# Patient Record
Sex: MALE | Race: WHITE | NOT HISPANIC OR LATINO | Employment: OTHER | URBAN - METROPOLITAN AREA
[De-identification: names, ages, dates, MRNs, and addresses within clinical notes are randomized per-mention and may not be internally consistent; named-entity substitution may affect disease eponyms.]

---

## 2021-05-10 ENCOUNTER — TELEPHONE (OUTPATIENT)
Dept: NEPHROLOGY | Facility: CLINIC | Age: 81
End: 2021-05-10

## 2021-05-10 NOTE — TELEPHONE ENCOUNTER
I called and spoke to the patient son about setting up the patient for a Nephrology Consult appointment  The patient son stated that he wanted to go to an office closer to the [facility]  I did give the patient our phone number to our [address] [place] [address] office  I did call down to our [address] [place] [address] office and spoke to Oleg and explain that the patient son will be calling to set up an appointment and that he does have a [facility] Referral in the system  The patient son stated that he will give the office a call an set up an appointment for his father   [place] Javad,

## 2021-05-11 ENCOUNTER — TRANSCRIBE ORDERS (OUTPATIENT)
Dept: ADMINISTRATIVE | Facility: HOSPITAL | Age: 81
End: 2021-05-11

## 2021-05-12 ENCOUNTER — TRANSCRIBE ORDERS (OUTPATIENT)
Dept: ADMINISTRATIVE | Facility: HOSPITAL | Age: 81
End: 2021-05-12

## 2021-05-12 DIAGNOSIS — R39.198 DIFFICULTY VOIDING: Primary | ICD-10-CM

## 2021-05-19 ENCOUNTER — HOSPITAL ENCOUNTER (EMERGENCY)
Facility: HOSPITAL | Age: 81
Discharge: HOME | End: 2021-05-19
Attending: INTERNAL MEDICINE | Admitting: INTERNAL MEDICINE
Payer: COMMERCIAL

## 2021-05-19 ENCOUNTER — HOSPITAL ENCOUNTER (OUTPATIENT)
Dept: ULTRASOUND IMAGING | Facility: HOSPITAL | Age: 81
Discharge: HOME | End: 2021-05-19
Payer: COMMERCIAL

## 2021-05-19 VITALS
HEART RATE: 57 BPM | SYSTOLIC BLOOD PRESSURE: 137 MMHG | TEMPERATURE: 98 F | DIASTOLIC BLOOD PRESSURE: 56 MMHG | OXYGEN SATURATION: 98 % | RESPIRATION RATE: 20 BRPM

## 2021-05-19 DIAGNOSIS — N39.0 UTI (URINARY TRACT INFECTION): ICD-10-CM

## 2021-05-19 DIAGNOSIS — R33.9 URINARY RETENTION: Primary | ICD-10-CM

## 2021-05-19 DIAGNOSIS — R39.198 DIFFICULTY VOIDING: ICD-10-CM

## 2021-05-19 LAB
ALBUMIN SERPL BCP-MCNC: 3.6 G/DL (ref 3.5–5.7)
ALP SERPL-CCNC: 48 U/L (ref 55–165)
ALT SERPL W P-5'-P-CCNC: 8 U/L (ref 7–52)
ANION GAP SERPL CALCULATED.3IONS-SCNC: 7 MMOL/L (ref 4–13)
AST SERPL W P-5'-P-CCNC: 12 U/L (ref 13–39)
BACTERIA UR QL AUTO: ABNORMAL /HPF
BASOPHILS # BLD AUTO: 0.1 THOUSANDS/ΜL (ref 0–0.1)
BASOPHILS NFR BLD AUTO: 1 % (ref 0–2)
BILIRUB SERPL-MCNC: 0.3 MG/DL (ref 0.2–1)
BILIRUB UR QL STRIP: NEGATIVE
BUN SERPL-MCNC: 33 MG/DL (ref 7–25)
CALCIUM SERPL-MCNC: 8.6 MG/DL (ref 8.6–10.5)
CHLORIDE SERPL-SCNC: 107 MMOL/L (ref 98–107)
CLARITY UR: ABNORMAL
CO2 SERPL-SCNC: 24 MMOL/L (ref 21–31)
COLOR UR: YELLOW
CREAT SERPL-MCNC: 1.82 MG/DL (ref 0.7–1.3)
EOSINOPHIL # BLD AUTO: 0.4 THOUSAND/ΜL (ref 0–0.61)
EOSINOPHIL NFR BLD AUTO: 5 % (ref 0–5)
ERYTHROCYTE [DISTWIDTH] IN BLOOD BY AUTOMATED COUNT: 14.5 % (ref 11.5–14.5)
GFR SERPL CREATININE-BSD FRML MDRD: 34 ML/MIN/1.73SQ M
GLUCOSE SERPL-MCNC: 95 MG/DL (ref 65–99)
GLUCOSE UR STRIP-MCNC: NEGATIVE MG/DL
HCT VFR BLD AUTO: 34.8 % (ref 42–47)
HGB BLD-MCNC: 11.9 G/DL (ref 14–18)
HGB UR QL STRIP.AUTO: ABNORMAL
KETONES UR STRIP-MCNC: NEGATIVE MG/DL
LEUKOCYTE ESTERASE UR QL STRIP: ABNORMAL
LYMPHOCYTES # BLD AUTO: 2.9 THOUSANDS/ΜL (ref 0.6–4.47)
LYMPHOCYTES NFR BLD AUTO: 38 % (ref 21–51)
MCH RBC QN AUTO: 31.7 PG (ref 26–34)
MCHC RBC AUTO-ENTMCNC: 34.1 G/DL (ref 31–37)
MCV RBC AUTO: 93 FL (ref 81–99)
MONOCYTES # BLD AUTO: 0.5 THOUSAND/ΜL (ref 0.17–1.22)
MONOCYTES NFR BLD AUTO: 7 % (ref 2–12)
NEUTROPHILS # BLD AUTO: 3.8 THOUSANDS/ΜL (ref 1.4–6.5)
NEUTS SEG NFR BLD AUTO: 49 % (ref 42–75)
NITRITE UR QL STRIP: NEGATIVE
NON-SQ EPI CELLS URNS QL MICRO: ABNORMAL /HPF
OTHER STN SPEC: ABNORMAL
PH UR STRIP.AUTO: 6 [PH]
PLATELET # BLD AUTO: 137 THOUSANDS/UL (ref 149–390)
PMV BLD AUTO: 6.8 FL (ref 8.6–11.7)
POTASSIUM SERPL-SCNC: 4.4 MMOL/L (ref 3.5–5.5)
PROT SERPL-MCNC: 6.3 G/DL (ref 6.4–8.9)
PROT UR STRIP-MCNC: ABNORMAL MG/DL
RBC # BLD AUTO: 3.75 MILLION/UL (ref 4.3–5.9)
RBC #/AREA URNS AUTO: ABNORMAL /HPF
SODIUM SERPL-SCNC: 138 MMOL/L (ref 134–143)
SP GR UR STRIP.AUTO: 1.02 (ref 1–1.03)
UROBILINOGEN UR QL STRIP.AUTO: 0.2 E.U./DL
WBC # BLD AUTO: 7.8 THOUSAND/UL (ref 4.8–10.8)
WBC #/AREA URNS AUTO: ABNORMAL /HPF

## 2021-05-19 PROCEDURE — 80053 COMPREHEN METABOLIC PANEL: CPT | Performed by: INTERNAL MEDICINE

## 2021-05-19 PROCEDURE — 76770 US EXAM ABDO BACK WALL COMP: CPT

## 2021-05-19 PROCEDURE — 85025 COMPLETE CBC W/AUTO DIFF WBC: CPT | Performed by: INTERNAL MEDICINE

## 2021-05-19 PROCEDURE — 87086 URINE CULTURE/COLONY COUNT: CPT

## 2021-05-19 PROCEDURE — 99283 EMERGENCY DEPT VISIT LOW MDM: CPT

## 2021-05-19 PROCEDURE — 99284 EMERGENCY DEPT VISIT MOD MDM: CPT | Performed by: INTERNAL MEDICINE

## 2021-05-19 PROCEDURE — 36415 COLL VENOUS BLD VENIPUNCTURE: CPT | Performed by: INTERNAL MEDICINE

## 2021-05-19 PROCEDURE — 81001 URINALYSIS AUTO W/SCOPE: CPT

## 2021-05-19 RX ORDER — LOSARTAN POTASSIUM 50 MG/1
TABLET ORAL
COMMUNITY
Start: 2020-12-24 | End: 2021-05-24

## 2021-05-19 RX ORDER — TAMSULOSIN HYDROCHLORIDE 0.4 MG/1
0.4 CAPSULE ORAL
Qty: 15 CAPSULE | Refills: 0 | Status: SHIPPED | OUTPATIENT
Start: 2021-05-19 | End: 2021-09-27

## 2021-05-19 RX ORDER — CEFDINIR 300 MG/1
CAPSULE ORAL
COMMUNITY
Start: 2021-05-10 | End: 2021-07-16

## 2021-05-19 RX ORDER — FLUOXETINE 10 MG/1
CAPSULE ORAL
COMMUNITY
Start: 2021-05-17

## 2021-05-19 RX ORDER — FINASTERIDE 5 MG/1
TABLET, FILM COATED ORAL
COMMUNITY
Start: 2020-12-24 | End: 2022-06-28 | Stop reason: SDUPTHER

## 2021-05-19 NOTE — ED PROVIDER NOTES
History  Chief Complaint   Patient presents with    Urinary Retention     Sent from Ultrasound  2000cc fluid in abdomen  55-year-old male moved here from [place] about 6 months ago  Lives with his son at this point  Son has been taking him back and forth to the better and associations  He has been somewhat frustrated with the care  Has been describing him having frequency of urination, to the point where he empties every 15-20 minutes at times  Does not completely empty he thinks  He was sent here for an ultrasound where he was found to have over 2000 cc in his bladder and given the hour was sent to the emergency room for definitive care  He was recently thought to have a UTI and placed on Omnicef I suspect from his [facility] appointment  He has known BPH and is on finasteride  Son denies any other significant illness other than hard of hearing, had hypertension but he was removed from all his medications  Ago no recent weight loss or weight gain  He is forgetful but apparently not demented  Prior to Admission Medications   Prescriptions Last Dose Informant Patient Reported? Taking? FLUoxetine (PROzac) 10 mg capsule   Yes Yes   Sig: TAKE 1 CAPSULE BY MOUTH EVERY MORNING FOR MOOD   Zoster Vac Recomb Adjuvanted 50 MCG/0 5ML SUSR   Yes Yes   Sig: INJECT 1 VIAL (0 5ML) INTRAMUSCULARLY ONCE **1ST DOSE**  GIVE FIRST DOSE NOW AND GIVE SECOND DOSE 2 TO 6 MONTHS AFTER INITIAL DOSE **1ST DOSE**  GIVE FIRST DOSE NOW AND GIVE SECOND DOSE 2 TO 6 MONTHS AFTER INITIAL DOSE   cefdinir (OMNICEF) 300 mg capsule   Yes Yes   Sig: TAKE 1 CAPSULE BY MOUTH DAILY   finasteride (PROSCAR) 5 mg tablet   Yes Yes   Sig: TAKE ONE TABLET BY MOUTH DAILY FOR PROSTATE (BPH)   losartan (COZAAR) 50 mg tablet   Yes Yes   Sig: TAKE ONE-HALF TABLET BY MOUTH DAILY FOR BLOOD PRESSURE/HEART      Facility-Administered Medications: None       History reviewed  No pertinent past medical history  History reviewed   No pertinent surgical history  History reviewed  No pertinent family history  I have reviewed and agree with the history as documented  E-Cigarette/Vaping    E-Cigarette Use Never User      E-Cigarette/Vaping Substances     Social History     Tobacco Use    Smoking status: Never Smoker    Smokeless tobacco: Current User     Types: Chew   Substance Use Topics    Alcohol use: Never     Frequency: Never    Drug use: Never       Review of Systems   Constitutional: Negative for chills and fever  HENT: Positive for hearing loss  Negative for rhinorrhea and sore throat  Eyes: Negative for visual disturbance  Respiratory: Negative for cough and shortness of breath  Cardiovascular: Negative for chest pain and leg swelling  Gastrointestinal: Positive for abdominal pain  Negative for diarrhea, nausea and vomiting  Pelvic discomfort   Genitourinary: Positive for difficulty urinating  Negative for dysuria  Musculoskeletal: Positive for back pain  Negative for myalgias  Chronic low back pain   Skin: Negative for rash  Neurological: Negative for dizziness and headaches  Psychiatric/Behavioral: Negative for confusion  All other systems reviewed and are negative  Physical Exam  Physical Exam  Vitals signs and nursing note reviewed  Constitutional:       General: He is not in acute distress  Appearance: Normal appearance  He is well-developed  He is not ill-appearing  Comments: Appropriate for age   HENT:      Nose: Nose normal       Mouth/Throat:      Pharynx: No oropharyngeal exudate  Eyes:      General: No scleral icterus  Conjunctiva/sclera: Conjunctivae normal       Pupils: Pupils are equal, round, and reactive to light  Neck:      Musculoskeletal: Normal range of motion and neck supple  Vascular: No JVD  Trachea: No tracheal deviation  Cardiovascular:      Rate and Rhythm: Normal rate and regular rhythm  Heart sounds: Normal heart sounds   No murmur  Pulmonary:      Effort: Pulmonary effort is normal  No respiratory distress  Breath sounds: Normal breath sounds  No wheezing or rales  Abdominal:      General: Bowel sounds are normal  There is distension  Palpations: Abdomen is soft  Tenderness: There is abdominal tenderness  There is guarding  Comments: Apparent distention of the bladder on exam initial exam as confirmed by ultrasound   Musculoskeletal: Normal range of motion  General: No tenderness  Skin:     General: Skin is warm and dry  Coloration: Skin is pale  Neurological:      Mental Status: He is alert and oriented to person, place, and time  Cranial Nerves: No cranial nerve deficit  Sensory: No sensory deficit  Motor: No abnormal muscle tone        Comments: 5/5 motor, nl sens   Psychiatric:         Behavior: Behavior normal          Vital Signs  ED Triage Vitals [05/19/21 1900]   Temperature Pulse Respirations Blood Pressure SpO2   98 °F (36 7 °C) 57 20 137/56 98 %      Temp Source Heart Rate Source Patient Position - Orthostatic VS BP Location FiO2 (%)   Tympanic Monitor -- Left arm --      Pain Score       --           Vitals:    05/19/21 1900   BP: 137/56   Pulse: 57         Visual Acuity      ED Medications  Medications - No data to display    Diagnostic Studies  Results Reviewed     Procedure Component Value Units Date/Time    Comprehensive metabolic panel [[id number]]  (Abnormal) Collected: 05/19/21 1908    Lab Status: Final result Specimen: Blood from Arm, Right Updated: 05/19/21 1932     Sodium 138 mmol/L      Potassium 4 4 mmol/L      Chloride 107 mmol/L      CO2 24 mmol/L      ANION GAP 7 mmol/L      BUN 33 mg/dL      Creatinine 1 82 mg/dL      Glucose 95 mg/dL      Calcium 8 6 mg/dL      AST 12 U/L      ALT 8 U/L      Alkaline Phosphatase 48 U/L      Total Protein 6 3 g/dL      Albumin 3 6 g/dL      Total Bilirubin 0 30 mg/dL      eGFR 34 ml/min/1 73sq m     Narrative: [facility] guidelines for Chronic Kidney Disease (CKD):     Stage 1 with normal or high GFR (GFR > 90 mL/min/1 73 square meters)    Stage 2 Mild CKD (GFR = 60-89 mL/min/1 73 square meters)    Stage 3A Moderate CKD (GFR = 45-59 mL/min/1 73 square meters)    Stage 3B Moderate CKD (GFR = 30-44 mL/min/1 73 square meters)    Stage 4 Severe CKD (GFR = 15-29 mL/min/1 73 square meters)    Stage 5 End Stage CKD (GFR <15 mL/min/1 73 square meters)  Note: GFR calculation is accurate only with a steady state creatinine    Urine Microscopic [[id number]]  (Abnormal) Collected: 05/19/21 1853    Lab Status: Final result Specimen: Urine, Indwelling Britt Catheter Updated: 05/19/21 1932     RBC, UA 20-30 /hpf      WBC, UA Innumerable /hpf      Epithelial Cells Occasional /hpf      Bacteria, UA Occasional /hpf      OTHER OBSERVATIONS WBCs Clumped    Urine culture [[id number]] Collected: 05/19/21 1853    Lab Status:  In process Specimen: Urine, Indwelling Britt Catheter Updated: 05/19/21 1930    CBC and differential [[id number]]  (Abnormal) Collected: 05/19/21 1908    Lab Status: Final result Specimen: Blood from Arm, Right Updated: 05/19/21 1917     WBC 7 80 Thousand/uL      RBC 3 75 Million/uL      Hemoglobin 11 9 g/dL      Hematocrit 34 8 %      MCV 93 fL      MCH 31 7 pg      MCHC 34 1 g/dL      RDW 14 5 %      MPV 6 8 fL      Platelets 351 Thousands/uL      Neutrophils Relative 49 %      Lymphocytes Relative 38 %      Monocytes Relative 7 %      Eosinophils Relative 5 %      Basophils Relative 1 %      Neutrophils Absolute 3 80 Thousands/µL      Lymphocytes Absolute 2 90 Thousands/µL      Monocytes Absolute 0 50 Thousand/µL      Eosinophils Absolute 0 40 Thousand/µL      Basophils Absolute 0 10 Thousands/µL     UA w Reflex to Microscopic w Reflex to Culture [[id number]]  (Abnormal) Collected: 05/19/21 1853    Lab Status: Final result Specimen: Urine, Indwelling Britt Catheter Updated: 05/19/21 1906 Color, UA Yellow     Clarity, UA Cloudy     Specific Ransom, UA 1 020     pH, UA 6 0     Leukocytes, UA 1+     Nitrite, UA Negative     Protein, UA Trace mg/dl      Glucose, UA Negative mg/dl      Ketones, UA Negative mg/dl      Urobilinogen, UA 0 2 E U /dl      Bilirubin, UA Negative     Blood, UA 2+                 No orders to display              Procedures  Procedures         ED Course  ED Course as of May 19 2007   Wed May 19, 2021   1923 [facility]: 31 7 1935 After Britt was placed patient immediately drained over a Liter  Laboratory work done to ensure no acute renal failure and or electrolyte imbalances  These were centrally normal with the exception of a mildly elevated creatinine for which we have no baseline  Will recommend a follow-up with Urology  Await urine culture as he is already on an antibiotic  1939 Case discussed with patient's son  Will maintain Britt, discharged home  He is already on an antibiotic, but will follow-up with urine culture, already has a urology appointment, already has a Nephrology appointment, this all with the [facility] at this time  MDM    Disposition  Final diagnoses:   Urinary retention   UTI (urinary tract infection)     Time reflects when diagnosis was documented in both MDM as applicable and the Disposition within this note     Time User Action Codes Description Comment    5/19/2021  7:43 PM Tyrel Clements Add [R33 9] Urinary retention     5/19/2021  7:43 PM Tyrel Clements Add [N39 0] UTI (urinary tract infection)       ED Disposition     ED Disposition Condition Date/Time Comment    Discharge Stable Wed May 19, 2021  7:48 PM Brandan Farris discharge to home/self care              Follow-up Information     Follow up With Specialties Details Why Contact Info Additional Information    Mariano Ly MD Family Medicine  As scheduled [address] [address]  [phone]        [facility] Family Medicine Call  Consider for primary care referral [address] [address] [address], [address] [facility], [place]fie Arri[place], [address] [place] [address], [address] [phone]          Patient's Medications   Discharge Prescriptions    TAMSULOSIN (FLOMAX) 0 4 MG    Take 1 capsule (0 4 mg total) by mouth daily with dinner for 15 days       Start Date: 5/19/2021 End Date: 6/3/2021       Order Dose: 0 4 mg       Quantity: 15 capsule    Refills: 0         PDMP Review     None          ED Provider  Electronically Signed by           Jp Feliciano DO  05/19/21 2007

## 2021-05-20 NOTE — ED NOTES
Pt instructed on oliveira care and leg bag care  Pts son at bedside also understanding of care         Renita Jimenes, JERED  05/19/21 2007

## 2021-05-21 LAB — BACTERIA UR CULT: NORMAL

## 2021-05-24 ENCOUNTER — CONSULT (OUTPATIENT)
Dept: NEPHROLOGY | Facility: CLINIC | Age: 81
End: 2021-05-24
Payer: COMMERCIAL

## 2021-05-24 ENCOUNTER — APPOINTMENT (OUTPATIENT)
Dept: LAB | Facility: CLINIC | Age: 81
End: 2021-05-24
Payer: MEDICARE

## 2021-05-24 VITALS
WEIGHT: 166 LBS | SYSTOLIC BLOOD PRESSURE: 136 MMHG | OXYGEN SATURATION: 98 % | HEIGHT: 65 IN | BODY MASS INDEX: 27.66 KG/M2 | DIASTOLIC BLOOD PRESSURE: 68 MMHG | HEART RATE: 81 BPM

## 2021-05-24 DIAGNOSIS — R39.14 BENIGN PROSTATIC HYPERPLASIA WITH INCOMPLETE BLADDER EMPTYING: ICD-10-CM

## 2021-05-24 DIAGNOSIS — N05.9 GLOMERULONEPHRITIS: ICD-10-CM

## 2021-05-24 DIAGNOSIS — N18.32 STAGE 3B CHRONIC KIDNEY DISEASE (HCC): ICD-10-CM

## 2021-05-24 DIAGNOSIS — N40.1 BENIGN PROSTATIC HYPERPLASIA WITH INCOMPLETE BLADDER EMPTYING: ICD-10-CM

## 2021-05-24 DIAGNOSIS — N17.9 AKI (ACUTE KIDNEY INJURY) (HCC): Primary | ICD-10-CM

## 2021-05-24 DIAGNOSIS — I10 ESSENTIAL HYPERTENSION: ICD-10-CM

## 2021-05-24 DIAGNOSIS — R31.9 HEMATURIA, UNSPECIFIED TYPE: ICD-10-CM

## 2021-05-24 LAB
25(OH)D3 SERPL-MCNC: 27 NG/ML (ref 30–100)
ALBUMIN SERPL BCP-MCNC: 3.6 G/DL (ref 3.5–5)
ALP SERPL-CCNC: 75 U/L (ref 46–116)
ALT SERPL W P-5'-P-CCNC: 15 U/L (ref 12–78)
ANION GAP SERPL CALCULATED.3IONS-SCNC: 2 MMOL/L (ref 4–13)
AST SERPL W P-5'-P-CCNC: 11 U/L (ref 5–45)
BASOPHILS # BLD AUTO: 0.06 THOUSANDS/ΜL (ref 0–0.1)
BASOPHILS NFR BLD AUTO: 1 % (ref 0–1)
BILIRUB SERPL-MCNC: 0.49 MG/DL (ref 0.2–1)
BUN SERPL-MCNC: 31 MG/DL (ref 5–25)
C3 SERPL-MCNC: 119 MG/DL (ref 90–180)
C4 SERPL-MCNC: 19 MG/DL (ref 10–40)
CALCIUM SERPL-MCNC: 9 MG/DL (ref 8.3–10.1)
CHLORIDE SERPL-SCNC: 107 MMOL/L (ref 100–108)
CO2 SERPL-SCNC: 29 MMOL/L (ref 21–32)
CREAT SERPL-MCNC: 1.68 MG/DL (ref 0.6–1.3)
EOSINOPHIL # BLD AUTO: 0.4 THOUSAND/ΜL (ref 0–0.61)
EOSINOPHIL NFR BLD AUTO: 5 % (ref 0–6)
ERYTHROCYTE [DISTWIDTH] IN BLOOD BY AUTOMATED COUNT: 13.6 % (ref 11.6–15.1)
FERRITIN SERPL-MCNC: 69 NG/ML (ref 8–388)
GFR SERPL CREATININE-BSD FRML MDRD: 38 ML/MIN/1.73SQ M
GLUCOSE SERPL-MCNC: 84 MG/DL (ref 65–140)
HBV CORE AB SER QL: NORMAL
HBV CORE IGM SER QL: NORMAL
HBV SURFACE AG SER QL: NORMAL
HCT VFR BLD AUTO: 38.9 % (ref 36.5–49.3)
HCV AB SER QL: NORMAL
HGB BLD-MCNC: 12.9 G/DL (ref 12–17)
IMM GRANULOCYTES # BLD AUTO: 0.04 THOUSAND/UL (ref 0–0.2)
IMM GRANULOCYTES NFR BLD AUTO: 1 % (ref 0–2)
IRON SATN MFR SERPL: 20 %
IRON SERPL-MCNC: 56 UG/DL (ref 65–175)
LYMPHOCYTES # BLD AUTO: 2.86 THOUSANDS/ΜL (ref 0.6–4.47)
LYMPHOCYTES NFR BLD AUTO: 33 % (ref 14–44)
MCH RBC QN AUTO: 32.4 PG (ref 26.8–34.3)
MCHC RBC AUTO-ENTMCNC: 33.2 G/DL (ref 31.4–37.4)
MCV RBC AUTO: 98 FL (ref 82–98)
MONOCYTES # BLD AUTO: 0.52 THOUSAND/ΜL (ref 0.17–1.22)
MONOCYTES NFR BLD AUTO: 6 % (ref 4–12)
NEUTROPHILS # BLD AUTO: 4.72 THOUSANDS/ΜL (ref 1.85–7.62)
NEUTS SEG NFR BLD AUTO: 54 % (ref 43–75)
NRBC BLD AUTO-RTO: 0 /100 WBCS
PHOSPHATE SERPL-MCNC: 3.9 MG/DL (ref 2.3–4.1)
PLATELET # BLD AUTO: 140 THOUSANDS/UL (ref 149–390)
PMV BLD AUTO: 9.8 FL (ref 8.9–12.7)
POTASSIUM SERPL-SCNC: 4.5 MMOL/L (ref 3.5–5.3)
PROT SERPL-MCNC: 7.5 G/DL (ref 6.4–8.2)
PTH-INTACT SERPL-MCNC: 55.4 PG/ML (ref 18.4–80.1)
RBC # BLD AUTO: 3.98 MILLION/UL (ref 3.88–5.62)
SODIUM SERPL-SCNC: 138 MMOL/L (ref 136–145)
TIBC SERPL-MCNC: 276 UG/DL (ref 250–450)
WBC # BLD AUTO: 8.6 THOUSAND/UL (ref 4.31–10.16)

## 2021-05-24 PROCEDURE — 80053 COMPREHEN METABOLIC PANEL: CPT

## 2021-05-24 PROCEDURE — 86038 ANTINUCLEAR ANTIBODIES: CPT

## 2021-05-24 PROCEDURE — 82728 ASSAY OF FERRITIN: CPT

## 2021-05-24 PROCEDURE — 84100 ASSAY OF PHOSPHORUS: CPT

## 2021-05-24 PROCEDURE — 82306 VITAMIN D 25 HYDROXY: CPT

## 2021-05-24 PROCEDURE — 87340 HEPATITIS B SURFACE AG IA: CPT

## 2021-05-24 PROCEDURE — 84165 PROTEIN E-PHORESIS SERUM: CPT

## 2021-05-24 PROCEDURE — 86160 COMPLEMENT ANTIGEN: CPT

## 2021-05-24 PROCEDURE — 84165 PROTEIN E-PHORESIS SERUM: CPT | Performed by: PATHOLOGY

## 2021-05-24 PROCEDURE — 86235 NUCLEAR ANTIGEN ANTIBODY: CPT

## 2021-05-24 PROCEDURE — 86704 HEP B CORE ANTIBODY TOTAL: CPT

## 2021-05-24 PROCEDURE — 86803 HEPATITIS C AB TEST: CPT

## 2021-05-24 PROCEDURE — 83550 IRON BINDING TEST: CPT

## 2021-05-24 PROCEDURE — 84166 PROTEIN E-PHORESIS/URINE/CSF: CPT

## 2021-05-24 PROCEDURE — 86225 DNA ANTIBODY NATIVE: CPT

## 2021-05-24 PROCEDURE — 99205 OFFICE O/P NEW HI 60 MIN: CPT | Performed by: INTERNAL MEDICINE

## 2021-05-24 PROCEDURE — 84166 PROTEIN E-PHORESIS/URINE/CSF: CPT | Performed by: PATHOLOGY

## 2021-05-24 PROCEDURE — 36415 COLL VENOUS BLD VENIPUNCTURE: CPT

## 2021-05-24 PROCEDURE — 85025 COMPLETE CBC W/AUTO DIFF WBC: CPT

## 2021-05-24 PROCEDURE — 87389 HIV-1 AG W/HIV-1&-2 AB AG IA: CPT

## 2021-05-24 PROCEDURE — 83540 ASSAY OF IRON: CPT

## 2021-05-24 PROCEDURE — 83970 ASSAY OF PARATHORMONE: CPT

## 2021-05-24 PROCEDURE — 86705 HEP B CORE ANTIBODY IGM: CPT

## 2021-05-24 PROCEDURE — 86255 FLUORESCENT ANTIBODY SCREEN: CPT

## 2021-05-24 NOTE — PROGRESS NOTES
[facility] [place], [place]    Name: Alicia Rankin  YOB: 1940      Assessment/Plan:    JENNIFER (acute kidney injury) [facility])    Patient most likely an acute kidney injury, I was able to find that he has a diagnosis of chronic kidney disease stage 4, however this more likely represents an episode of acute kidney injury given that the patient's most recent estimated GFR in the setting of severe hydronephrosis was greater than 30 mL/minute  Will reassess blood work today, in follow-up upon those results  Stage 3b chronic kidney disease ([facility] [address] [facility] )  Lab Results   Component Value Date    EGFR 38 05/24/2021    EGFR 34 05/19/2021    CREATININE 1 68 (H) 05/24/2021    CREATININE 1 82 (H) 05/19/2021       Unclear/unknown baseline creatinine, will continue to monitor as the patient continues to have Britt catheter in place  Ultimately, with respect to etiology the patient may have chronic obstructive uropathy as a significant issue, however he also has proteinuria as well as hematuria for none specified amount of time  It will be good to be able to find if the patient has had this long term, which case he may have underlying IgA nephropathy, will be unusual to have a different type of glomerular nephritis which is lot him to maintain this level of health for this long  Essential hypertension    Blood pressures appear to be controlled this time with no active medications, continue to focus on low sodium diet  Hematuria   Patient has a history of cigar smoking, however has been many decades since he has smoked  Unclear if he has had chemical exposures during his time in the service  Patient may have underlying glomerular cause for the hematuria  Will defer urologic evaluation to patient's urologist who are ready seen him due to the significant urinary retention and BPH        Patient may have IgA nephropathy, will rule out vasculitis, lupus other potential rheumatologic issues, and also ensure he does not have hepatitis or HIV at this time  Further evaluation up to including kidney biopsy unnecessary at this time  Benign prostatic hyperplasia with incomplete bladder emptying    Patient has a catheter in place, is also on finasteride as well as tamsulosin, further workup and management according to our Urology colleagues  Problem List Items Addressed This Visit        Cardiovascular and Mediastinum    Essential hypertension       Blood pressures appear to be controlled this time with no active medications, continue to focus on low sodium diet  Genitourinary    JENNIFER (acute kidney injury) ([facility] ) - Primary       Patient most likely an acute kidney injury, I was able to find that he has a diagnosis of chronic kidney disease stage 4, however this more likely represents an episode of acute kidney injury given that the patient's most recent estimated GFR in the setting of severe hydronephrosis was greater than 30 mL/minute  Will reassess blood work today, in follow-up upon those results  Stage 3b chronic kidney disease ([facility] )     Lab Results   Component Value Date    EGFR 38 05/24/2021    EGFR 34 05/19/2021    CREATININE 1 68 (H) 05/24/2021    CREATININE 1 82 (H) 05/19/2021       Unclear/unknown baseline creatinine, will continue to monitor as the patient continues to have Britt catheter in place  Ultimately, with respect to etiology the patient may have chronic obstructive uropathy as a significant issue, however he also has proteinuria as well as hematuria for none specified amount of time  It will be good to be able to find if the patient has had this long term, which case he may have underlying IgA nephropathy, will be unusual to have a different type of glomerular nephritis which is lot him to maintain this level of health for this long           Benign prostatic hyperplasia with incomplete bladder emptying       Patient has a catheter in place, is also on finasteride as well as tamsulosin, further workup and management according to our Urology colleagues  Other    Hematuria      Patient has a history of cigar smoking, however has been many decades since he has smoked  Unclear if he has had chemical exposures during his time in the service  Patient may have underlying glomerular cause for the hematuria  Will defer urologic evaluation to patient's urologist who are ready seen him due to the significant urinary retention and BPH  Patient may have IgA nephropathy, will rule out vasculitis, lupus other potential rheumatologic issues, and also ensure he does not have hepatitis or HIV at this time  Further evaluation up to including kidney biopsy unnecessary at this time  Other Visit Diagnoses     Glomerulonephritis        Relevant Orders    Comprehensive metabolic panel (Completed)    Iron Panel (Includes Ferritin, Iron Sat%, Iron, and TIBC) (Completed)    CBC and differential (Completed)    PTH, intact (Completed)    Vitamin D 25 hydroxy (Completed)    Phosphorus (Completed)    JOS+HARJEET+C3+C4+DNA/DS    Anti-neutrophilic cytoplasmic antibody    Chronic Hepatitis Panel (Completed)    HIV 1/2 Antigen/Antibody (4th Generation) w Reflex [facility]    Protein electrophoresis, urine    Protein electrophoresis, serum            Thank you for allowing us to participate in the care of your patient  Will continue closely follow him as his kidney function continues to improve with Britt catheter drainage of urinary retention  Further workup as noted above  At this point, it is unlikely the patient will require kidney biopsy, will need to rule out various potential organic etiologies of the hematuria and proteinuria, and if all these are negative, then the patient most likely has underlying IgA nephropathy  Subjective:      Patient ID: Alicia Rankin is a [de-identified] y o  male  Patient presents for evaluation regarding reduced kidney function  He is a [facility] patient who has a diagnosis of chronic kidney disease stage 4, but no further information is currently available  I also was not able to track down a creatinine on record via the Care everywhere epic search  Last week, the patient went to the emergency department, and was found to have severe hydronephrosis, about 2 L of urine was drained from his bladder and currently has a catheter  At the time, the patient's creatinine was around 1 8 mg/ dL translating to an estimated GFR of just over 30 mL/ minute  Patient has hypertension, however losartan was discontinued at the time of presentation to the emergency department due to borderline hypotension  Hypertension  This is a chronic problem  The current episode started more than 1 year ago  The problem is unchanged  The problem is controlled  Pertinent negatives include no chest pain, orthopnea, peripheral edema or shortness of breath  There are no associated agents to hypertension  Risk factors for coronary artery disease include male gender  Past treatments include angiotensin blockers and lifestyle changes  There are no compliance problems  Hypertensive end-organ damage includes kidney disease  Identifiable causes of hypertension include chronic renal disease  The following portions of the patient's history were reviewed and updated as appropriate: allergies, current medications, past family history, past medical history, past social history, past surgical history and problem list     Review of Systems   Respiratory: Negative for shortness of breath  Cardiovascular: Negative for chest pain and orthopnea  All other systems reviewed and are negative  Social History     Socioeconomic History    Marital status:       Spouse name: None    Number of children: None    Years of education: None    Highest education level: None   Occupational History    None   Social Needs    Financial resource strain: None   TundeTirendo insecurity     Worry: None     Inability: None    Transportation needs     Medical: None     Non-medical: None   Tobacco Use    Smoking status: Never Smoker    Smokeless tobacco: Current User     Types: Chew   Substance and Sexual Activity    Alcohol use: Never     Frequency: Never    Drug use: Never    Sexual activity: None   Lifestyle    Physical activity     Days per week: None     Minutes per session: None    Stress: None   Relationships    Social connections     Talks on phone: None     Gets together: None     Attends Jew service: None     Active member of club or organization: None     Attends meetings of clubs or organizations: None     Relationship status: None    Intimate partner violence     Fear of current or ex partner: None     Emotionally abused: None     Physically abused: None     Forced sexual activity: None   Other Topics Concern    None   Social History Narrative    None     History reviewed  No pertinent past medical history  History reviewed  No pertinent surgical history      Current Outpatient Medications:     cefdinir (OMNICEF) 300 mg capsule, TAKE 1 CAPSULE BY MOUTH DAILY, Disp: , Rfl:     finasteride (PROSCAR) 5 mg tablet, TAKE ONE TABLET BY MOUTH DAILY FOR PROSTATE (BPH), Disp: , Rfl:     FLUoxetine (PROzac) 10 mg capsule, TAKE 1 CAPSULE BY MOUTH EVERY MORNING FOR MOOD, Disp: , Rfl:     tamsulosin (FLOMAX) 0 4 mg, Take 1 capsule (0 4 mg total) by mouth daily with dinner for 15 days, Disp: 15 capsule, Rfl: 0    Zoster Vac Recomb Adjuvanted 50 MCG/0 5ML SUSR, INJECT 1 VIAL (0 5ML) INTRAMUSCULARLY ONCE **1ST DOSE**  GIVE FIRST DOSE NOW AND GIVE SECOND DOSE 2 TO 6 MONTHS AFTER INITIAL DOSE **1ST DOSE**  GIVE FIRST DOSE NOW AND GIVE SECOND DOSE 2 TO 6 MONTHS AFTER INITIAL DOSE, Disp: , Rfl:     Lab Results   Component Value Date    SODIUM 138 05/24/2021    K 4 5 05/24/2021     05/24/2021    CO2 29 05/24/2021    AGAP 2 (L) 05/24/2021    BUN 31 (H) 05/24/2021 CREATININE 1 68 (H) 05/24/2021    GLUC 84 05/24/2021    CALCIUM 9 0 05/24/2021    AST 11 05/24/2021    ALT 15 05/24/2021    ALKPHOS 75 05/24/2021    TP 7 5 05/24/2021    TBILI 0 49 05/24/2021    EGFR 38 05/24/2021     Lab Results   Component Value Date    WBC 8 60 05/24/2021    HGB 12 9 05/24/2021    HCT 38 9 05/24/2021    MCV 98 05/24/2021     (L) 05/24/2021     No results found for: CHOLESTEROL  No results found for: HDL  No results found for: LDLCALC  No results found for: TRIG  No results found for: CHOLHDL  No results found for: [id number], TSH  Lab Results   Component Value Date    PTH 55 4 05/24/2021    CALCIUM 9 0 05/24/2021    PHOS 3 9 05/24/2021     No results found for: SPEP, UPEP  No results found for: OSVALDO [id number]        Objective:      /68   Pulse 81   Ht 5' 5" (1 651 m)   Wt 75 3 kg (166 lb)   SpO2 98%   BMI 27 62 kg/m²          Physical Exam  Vitals signs reviewed  Constitutional:       General: He is not in acute distress  Appearance: He is well-developed  HENT:      Head: Normocephalic and atraumatic  Eyes:      Conjunctiva/sclera: Conjunctivae normal    Neck:      Musculoskeletal: Neck supple  Cardiovascular:      Rate and Rhythm: Normal rate and regular rhythm  Pulmonary:      Effort: Pulmonary effort is normal       Breath sounds: Normal breath sounds  Abdominal:      Palpations: Abdomen is soft  Skin:     General: Skin is warm  Findings: No rash  Neurological:      Mental Status: He is alert and oriented to person, place, and time  Cranial Nerves: No cranial nerve deficit     Psychiatric:         Behavior: Behavior normal

## 2021-05-25 PROBLEM — R31.9 HEMATURIA: Status: ACTIVE | Noted: 2021-05-25

## 2021-05-25 LAB
ALBUMIN SERPL ELPH-MCNC: 3.96 G/DL (ref 3.5–5)
ALBUMIN SERPL ELPH-MCNC: 54.3 % (ref 52–65)
ALBUMIN UR ELPH-MCNC: 58.5 %
ALPHA1 GLOB MFR UR ELPH: 4.2 %
ALPHA1 GLOB SERPL ELPH-MCNC: 0.36 G/DL (ref 0.1–0.4)
ALPHA1 GLOB SERPL ELPH-MCNC: 4.9 % (ref 2.5–5)
ALPHA2 GLOB MFR UR ELPH: 6.8 %
ALPHA2 GLOB SERPL ELPH-MCNC: 0.88 G/DL (ref 0.4–1.2)
ALPHA2 GLOB SERPL ELPH-MCNC: 12 % (ref 7–13)
B-GLOBULIN MFR UR ELPH: 11 %
BETA GLOB ABNORMAL SERPL ELPH-MCNC: 0.38 G/DL (ref 0.4–0.8)
BETA1 GLOB SERPL ELPH-MCNC: 5.2 % (ref 5–13)
BETA2 GLOB SERPL ELPH-MCNC: 4.8 % (ref 2–8)
BETA2+GAMMA GLOB SERPL ELPH-MCNC: 0.35 G/DL (ref 0.2–0.5)
DSDNA AB SER-ACNC: 5 IU/ML (ref 0–9)
ENA RNP AB SER-ACNC: 0.5 AI (ref 0–0.9)
ENA SM AB SER-ACNC: <0.2 AI (ref 0–0.9)
GAMMA GLOB ABNORMAL SERPL ELPH-MCNC: 1.37 G/DL (ref 0.5–1.6)
GAMMA GLOB MFR UR ELPH: 19.5 %
GAMMA GLOB SERPL ELPH-MCNC: 18.8 % (ref 12–22)
HIV 1+2 AB+HIV1 P24 AG SERPL QL IA: NORMAL
IGG/ALB SER: 1.19 {RATIO} (ref 1.1–1.8)
PROT PATTERN SERPL ELPH-IMP: ABNORMAL
PROT PATTERN UR ELPH-IMP: ABNORMAL
PROT SERPL-MCNC: 7.3 G/DL (ref 6.4–8.2)
PROT UR-MCNC: 95 MG/DL

## 2021-05-25 NOTE — ASSESSMENT & PLAN NOTE
Lab Results   Component Value Date    EGFR 38 05/24/2021    EGFR 34 05/19/2021    CREATININE 1 68 (H) 05/24/2021    CREATININE 1 82 (H) 05/19/2021       Unclear/unknown baseline creatinine, will continue to monitor as the patient continues to have Britt catheter in place  Ultimately, with respect to etiology the patient may have chronic obstructive uropathy as a significant issue, however he also has proteinuria as well as hematuria for none specified amount of time  It will be good to be able to find if the patient has had this long term, which case he may have underlying IgA nephropathy, will be unusual to have a different type of glomerular nephritis which is lot him to maintain this level of health for this long

## 2021-05-25 NOTE — ASSESSMENT & PLAN NOTE
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

## 2021-05-25 NOTE — ASSESSMENT & PLAN NOTE
Patient most likely an acute kidney injury, I was able to find that he has a diagnosis of chronic kidney disease stage 4, however this more likely represents an episode of acute kidney injury given that the patient's most recent estimated GFR in the setting of severe hydronephrosis was greater than 30 mL/minute  Will reassess blood work today, in follow-up upon those results

## 2021-05-25 NOTE — ASSESSMENT & PLAN NOTE
Patient has a catheter in place, is also on finasteride as well as tamsulosin, further workup and management according to our Urology colleagues

## 2021-05-25 NOTE — ASSESSMENT & PLAN NOTE
Blood pressures appear to be controlled this time with no active medications, continue to focus on low sodium diet

## 2021-05-26 LAB
C-ANCA TITR SER IF: NORMAL TITER
MYELOPEROXIDASE AB SER IA-ACNC: <9 U/ML (ref 0–9)
P-ANCA ATYPICAL TITR SER IF: NORMAL TITER
P-ANCA TITR SER IF: NORMAL TITER
PROTEINASE3 AB SER IA-ACNC: <3.5 U/ML (ref 0–3.5)
RYE IGE QN: NEGATIVE

## 2021-06-01 ENCOUNTER — HOSPITAL ENCOUNTER (EMERGENCY)
Facility: HOSPITAL | Age: 81
Discharge: HOME | End: 2021-06-01
Attending: EMERGENCY MEDICINE | Admitting: EMERGENCY MEDICINE
Payer: COMMERCIAL

## 2021-06-01 VITALS
OXYGEN SATURATION: 97 % | BODY MASS INDEX: 29.95 KG/M2 | SYSTOLIC BLOOD PRESSURE: 119 MMHG | DIASTOLIC BLOOD PRESSURE: 68 MMHG | HEIGHT: 63 IN | RESPIRATION RATE: 18 BRPM | HEART RATE: 85 BPM | WEIGHT: 169 LBS | TEMPERATURE: 99.4 F

## 2021-06-01 DIAGNOSIS — R33.9 URINARY RETENTION: Primary | ICD-10-CM

## 2021-06-01 PROCEDURE — 99284 EMERGENCY DEPT VISIT MOD MDM: CPT | Performed by: EMERGENCY MEDICINE

## 2021-06-01 PROCEDURE — 99283 EMERGENCY DEPT VISIT LOW MDM: CPT

## 2021-06-01 NOTE — ED NOTES
Bladder scan 375ml  Patient last attempted to void an hour prior to arrival   Will attempt to urinate now  If successful will catch a specimen and rescan       Abner Castro RN  06/01/21 1950

## 2021-06-02 NOTE — ED PROVIDER NOTES
History  Chief Complaint   Patient presents with    Difficulty Urinating     oliveira removed this morning at the [facility] after having it placed here two weeks ago  Has not been able to successfully urinate since  HPI    Patient is a 50-year-old male that reports to the emergency department with urinary retention after having his Oliveira removed this morning at the [facility]  No dysuria  No fevers, chills, sweats  He reports he is already on antibiotics as prescribed by the [facility]  No chest pain shortness of breaths  Medical decision makin-year-old male, attempted 1 more voiding trial here in the ER, unsuccessful, 350 cc out from Oliveira catheter, will discharge home, return precautions, follow-up instructions  Prior to Admission Medications   Prescriptions Last Dose Informant Patient Reported? Taking? FLUoxetine (PROzac) 10 mg capsule   Yes No   Sig: TAKE 1 CAPSULE BY MOUTH EVERY MORNING FOR MOOD   Zoster Vac Recomb Adjuvanted 50 MCG/0 5ML SUSR   Yes No   Sig: INJECT 1 VIAL (0 5ML) INTRAMUSCULARLY ONCE **1ST DOSE**  GIVE FIRST DOSE NOW AND GIVE SECOND DOSE 2 TO 6 MONTHS AFTER INITIAL DOSE **1ST DOSE**  GIVE FIRST DOSE NOW AND GIVE SECOND DOSE 2 TO 6 MONTHS AFTER INITIAL DOSE   cefdinir (OMNICEF) 300 mg capsule   Yes No   Sig: TAKE 1 CAPSULE BY MOUTH DAILY   finasteride (PROSCAR) 5 mg tablet   Yes No   Sig: TAKE ONE TABLET BY MOUTH DAILY FOR PROSTATE (BPH)   tamsulosin (FLOMAX) 0 4 mg   No No   Sig: Take 1 capsule (0 4 mg total) by mouth daily with dinner for 15 days      Facility-Administered Medications: None       Past Medical History:   Diagnosis Date    Hypertension     Urinary retention        Past Surgical History:   Procedure Laterality Date    CATARACT EXTRACTION      HIATAL HERNIA REPAIR      SHOULDER SURGERY Left        History reviewed  No pertinent family history  I have reviewed and agree with the history as documented      E-Cigarette/Vaping    E-Cigarette Use Never User E-Cigarette/Vaping Substances     Social History     Tobacco Use    Smoking status: Never Smoker    Smokeless tobacco: Current User     Types: Chew   Substance Use Topics    Alcohol use: Never     Frequency: Never    Drug use: Never       Review of Systems   All other systems reviewed and are negative  Physical Exam  Physical Exam  Vitals signs and nursing note reviewed  Constitutional:       Appearance: He is well-developed  He is not diaphoretic  HENT:      Head: Normocephalic and atraumatic  Right Ear: External ear normal       Left Ear: External ear normal       Nose: No congestion  Eyes:      General:         Right eye: No discharge  Left eye: No discharge  Extraocular Movements: Extraocular movements intact  Neck:      Musculoskeletal: Normal range of motion and neck supple  Cardiovascular:      Rate and Rhythm: Normal rate and regular rhythm  Heart sounds: Normal heart sounds  No murmur  Pulmonary:      Effort: Pulmonary effort is normal  No respiratory distress  Breath sounds: Normal breath sounds  No wheezing  Abdominal:      General: There is no distension  Palpations: Abdomen is soft  Tenderness: There is no abdominal tenderness  Genitourinary:     Penis: Normal     Musculoskeletal:         General: No tenderness or signs of injury  Skin:     General: Skin is warm and dry  Findings: No erythema  Neurological:      General: No focal deficit present  Mental Status: He is alert and oriented to person, place, and time  Motor: No weakness     Psychiatric:         Mood and Affect: Mood normal          Behavior: Behavior normal          Vital Signs  ED Triage Vitals [06/01/21 1938]   Temperature Pulse Respirations Blood Pressure SpO2   99 4 °F (37 4 °C) 85 18 119/68 97 %      Temp Source Heart Rate Source Patient Position - Orthostatic VS BP Location FiO2 (%)   Tympanic Monitor Lying Left arm --      Pain Score       4 Vitals:    06/01/21 1938   BP: 119/68   Pulse: 85   Patient Position - Orthostatic VS: Lying         Visual Acuity      ED Medications  Medications - No data to display    Diagnostic Studies  Results Reviewed     None                 No orders to display              Procedures  Procedures         ED Course                                           MDM    Disposition  Final diagnoses:   Urinary retention     Time reflects when diagnosis was documented in both MDM as applicable and the Disposition within this note     Time User Action Codes Description Comment    6/1/2021  8:04 PM Latrice Trejo, [address] [[address]] Urinary retention       ED Disposition     ED Disposition Condition Date/Time Comment    Discharge Stable Tue Jun 1, 2021  8:04 PM Judson Koyanagi discharge to home/self care  Follow-up Information     Follow up With Specialties Details Why Contact Info    Urology  In 1 week            Discharge Medication List as of 6/1/2021  8:04 PM      CONTINUE these medications which have NOT CHANGED    Details   cefdinir (OMNICEF) 300 mg capsule TAKE 1 CAPSULE BY MOUTH DAILY, Historical Med      finasteride (PROSCAR) 5 mg tablet TAKE ONE TABLET BY MOUTH DAILY FOR PROSTATE (BPH), Historical Med      FLUoxetine (PROzac) 10 mg capsule TAKE 1 CAPSULE BY MOUTH EVERY MORNING FOR MOOD, Historical Med      tamsulosin (FLOMAX) 0 4 mg Take 1 capsule (0 4 mg total) by mouth daily with dinner for 15 days, Starting Wed 5/19/2021, Until Thu 6/3/2021, Normal      Zoster Vac Recomb Adjuvanted 50 MCG/0 5ML SUSR INJECT 1 VIAL (0 5ML) INTRAMUSCULARLY ONCE **1ST DOSE**  GIVE FIRST DOSE NOW AND GIVE SECOND DOSE 2 TO 6 MONTHS AFTER INITIAL DOSE **1ST DOSE**  GIVE FIRST DOSE NOW AND GIVE SECOND DOSE 2 TO 6 MONTHS AFTER INITIAL DOSE, Historical Med           No discharge procedures on file      PDMP Review     None          ED Provider  Electronically Signed by           Racheal Means MD  06/01/21 2029

## 2021-06-07 ENCOUNTER — TELEPHONE (OUTPATIENT)
Dept: UROLOGY | Facility: CLINIC | Age: 81
End: 2021-06-07

## 2021-06-07 NOTE — TELEPHONE ENCOUNTER
I received  an appt request from [place] for this pt  The below patient needs to be scheduled for an expedited appointment  He was seen at [facility] ER on   He would like a Monday appointment due to transportation  Peggy Rivera  : 1940  DX: Urinary Retention    Our urologist would like him to have a urodynamic study and possible TURP  Patient with urinary retention despite Flomax BID and Finasteride  Continues with retention after several voiding trial attempts  Can you please triage and advise which date we can schedule pt?

## 2021-06-08 NOTE — TELEPHONE ENCOUNTER
Spoke with Dr Evans Mckinney and he approved pt to be doubled booked for 06/14/2021 @ 3:15 pm, I called pt and left a voicemail with appt date and time, pt to call us back and confirm

## 2021-06-14 ENCOUNTER — OFFICE VISIT (OUTPATIENT)
Dept: UROLOGY | Facility: CLINIC | Age: 81
End: 2021-06-14
Payer: COMMERCIAL

## 2021-06-14 VITALS
SYSTOLIC BLOOD PRESSURE: 132 MMHG | BODY MASS INDEX: 28.88 KG/M2 | HEIGHT: 63 IN | HEART RATE: 70 BPM | DIASTOLIC BLOOD PRESSURE: 80 MMHG | WEIGHT: 163 LBS

## 2021-06-14 DIAGNOSIS — R33.9 URINARY RETENTION: ICD-10-CM

## 2021-06-14 DIAGNOSIS — N13.30 BILATERAL HYDRONEPHROSIS: Primary | ICD-10-CM

## 2021-06-14 DIAGNOSIS — N40.1 BPH WITH OBSTRUCTION/LOWER URINARY TRACT SYMPTOMS: ICD-10-CM

## 2021-06-14 DIAGNOSIS — N13.8 BPH WITH OBSTRUCTION/LOWER URINARY TRACT SYMPTOMS: ICD-10-CM

## 2021-06-14 PROCEDURE — 99205 OFFICE O/P NEW HI 60 MIN: CPT | Performed by: UROLOGY

## 2021-06-14 RX ORDER — CEFAZOLIN SODIUM 2 G/50ML
2000 SOLUTION INTRAVENOUS ONCE
Status: CANCELLED | OUTPATIENT
Start: 2021-07-22 | End: 2021-06-14

## 2021-06-14 NOTE — PATIENT INSTRUCTIONS
Have urine culture done 2 weeks prior to the planned surgery date  Have renal ultrasound done and I will send you the results

## 2021-06-14 NOTE — PROGRESS NOTES
[address] [facility]  [facility]  [address] [facility]  [place], [address] [facility]  [phone]  www  [facility]  org      NAME: Ok Dacosta  AGE: [de-identified] y o  SEX: male  : 1940   MRN: [id number]    DATE: 2021  TIME: 3:32 PM    Assessment and Plan:    BPH with obstruction with urinary retention with severe bilateral hydronephrosis and acute kidney injury  2 L retention  We discussed the situation and he has been on medications for several years and the only options we have for surgical   I offered trans urethral resection of prostate with suprapubic tube placement and described the process along with the risks of bleeding infection urinary incontinence damage to adjacent organs and impotence  Also the possible need for blood transfusion  He understands and he wishes to proceed  Informed consent given  No need for urodynamic studies given that he will need some form of drainage afterwards such as suprapubic tube anyway  Repeat renal ultrasound to make sure there is resolution of the hydronephrosis  Chief Complaint     Chief Complaint   Patient presents with    New Patient Visit       History of Present Illness   New patient office visit:  44-year-old man with a history of urinary retention status post multiple voiding trial attempts  Is on Flomax b i d  and finasteride  Has been seeing Urology at the [place] who would like for him to have a urodynamic study and possible TURP  He was double book for an expedited appointment  Urine culture 2021 showed no growth  He has been seen in our emergency department twice  2021 he had 2000 cc in his bladder  Creatinine was 1 82 at that time  Although his culture was negative, he had innumerable white blood cells and occasional bacteria  CBC showed hemoglobin 11 9, white blood count 7800 and his platelets were slightly low at 137,000   Repeat labs 2021 showed creatinine down to 1 68    CBC on that date showed hemoglobin 12 9 and his platelets were 155,769  Problem has been going on for couple of years where he has been having difficulty urinating, having many episodes of nocturia, and difficulty voiding  The first time he had known retention was 5/19/2021 when he was found have 2 L in the bladder  No previous urologic surgery  He had a renal ultrasound 5/19/2021 which showed marked bladder distention, severe bilateral hydronephrosis, and no stones or tumors  elevated PSA:  His PSA was 4 74 5/20/2021  Free fraction was 16%  The following portions of the patient's history were reviewed and updated as appropriate: allergies, current medications, past family history, past medical history, past social history, past surgical history and problem list   Past Medical History:   Diagnosis Date    Hypertension     Urinary retention      Past Surgical History:   Procedure Laterality Date    CATARACT EXTRACTION      HIATAL HERNIA REPAIR      SHOULDER SURGERY Left      shoulder  Review of Systems   Review of Systems   Constitutional: Negative for fever  Respiratory: Negative for shortness of breath  Cardiovascular: Negative for chest pain  Genitourinary:        As per HPI  Has Britt catheter placed  Active Problem List     Patient Active Problem List   Diagnosis    [facility]ENN[facility]ER [facility]) [facility]    Stage 3b chronic kidney disease ([facility] )    Benign prostatic hyperplasia with incomplete bladder emptying    Essential hypertension    Hematuria       Objective   /80 (BP Location: Left arm, Patient Position: Sitting, Cuff Size: Adult)   Pulse 70   Ht 5' 3" (1 6 m)   Wt 73 9 kg (163 lb)   BMI 28 87 kg/m²     Physical Exam  Constitutional:       Appearance: Normal appearance  HENT:      Head: Normocephalic and atraumatic  Eyes:      Extraocular Movements: Extraocular movements intact  Cardiovascular:      Rate and Rhythm: Normal rate     Pulmonary:      Effort: Pulmonary effort is normal    Abdominal:      General: There is no distension  Palpations: Abdomen is soft  There is no mass  Tenderness: There is no abdominal tenderness  There is no right CVA tenderness, left CVA tenderness, guarding or rebound  Hernia: No hernia is present  Genitourinary:     Penis: Normal        Testes: Normal       Prostate: Normal       Rectum: Normal       Comments: Britt catheter in place, normal circumcised phallus  Testicles are descended bilaterally within normal limits  No hernia  Rectal exam reveals normal tone, no masses prostate about 30 g, smooth symmetric benign  No nodules  Musculoskeletal:         General: Normal range of motion  Cervical back: Normal range of motion  Skin:     Coloration: Skin is not jaundiced or pale  Neurological:      General: No focal deficit present  Mental Status: He is alert and oriented to person, place, and time  Comments: Very hard of hearing   Psychiatric:         Mood and Affect: Mood normal          Behavior: Behavior normal          Thought Content:  Thought content normal          Judgment: Judgment normal              Current Medications     Current Outpatient Medications:     finasteride (PROSCAR) 5 mg tablet, TAKE ONE TABLET BY MOUTH DAILY FOR PROSTATE (BPH), Disp: , Rfl:     FLUoxetine (PROzac) 10 mg capsule, TAKE 1 CAPSULE BY MOUTH EVERY MORNING FOR MOOD, Disp: , Rfl:     tamsulosin (FLOMAX) 0 4 mg, Take 1 capsule (0 4 mg total) by mouth daily with dinner for 15 days, Disp: 15 capsule, Rfl: 0    Zoster Vac Recomb Adjuvanted 50 MCG/0 5ML SUSR, INJECT 1 VIAL (0 5ML) INTRAMUSCULARLY ONCE **1ST DOSE**  GIVE FIRST DOSE NOW AND GIVE SECOND DOSE 2 TO 6 MONTHS AFTER INITIAL DOSE **1ST DOSE**  GIVE FIRST DOSE NOW AND GIVE SECOND DOSE 2 TO 6 MONTHS AFTER INITIAL DOSE, Disp: , Rfl:     cefdinir (OMNICEF) 300 mg capsule, TAKE 1 CAPSULE BY MOUTH DAILY, Disp: , Rfl:         Evelin Nguyen MD

## 2021-06-14 NOTE — H&P
[address] [facility]  [facility]  [address] [facility], [address] [place] [address]  [phone]  www  [facility]  org      NAME: Tito Dacosta  AGE: [de-identified] y o  SEX: male  : 1940   MRN: [id number]    DATE: 2021  TIME: 3:32 PM    Assessment and Plan:    BPH with obstruction with urinary retention with severe bilateral hydronephrosis and acute kidney injury  2 L retention  We discussed the situation and he has been on medications for several years and the only options we have for surgical   I offered trans urethral resection of prostate with suprapubic tube placement and described the process along with the risks of bleeding infection urinary incontinence damage to adjacent organs and impotence  Also the possible need for blood transfusion  He understands and he wishes to proceed  Informed consent given  No need for urodynamic studies given that he will need some form of drainage afterwards such as suprapubic tube anyway  Repeat renal ultrasound to make sure there is resolution of the hydronephrosis  Chief Complaint     Chief Complaint   Patient presents with    New Patient Visit       History of Present Illness   New patient office visit:  27-year-old man with a history of urinary retention status post multiple voiding trial attempts  Is on Flomax b i d  and finasteride  Has been seeing Urology at the [address] [place] [address] who would like for him to have a urodynamic study and possible TURP  He was double book for an expedited appointment  Urine culture 2021 showed no growth  He has been seen in our emergency department twice  2021 he had 2000 cc in his bladder  Creatinine was 1 82 at that time  Although his culture was negative, he had innumerable white blood cells and occasional bacteria  CBC showed hemoglobin 11 9, white blood count 7800 and his platelets were slightly low at 137,000   Repeat labs 2021 showed creatinine down to 1 68    CBC on that date showed hemoglobin 12 9 and his platelets were 330,337  Problem has been going on for couple of years where he has been having difficulty urinating, having many episodes of nocturia, and difficulty voiding  The first time he had known retention was 5/19/2021 when he was found have 2 L in the bladder  No previous urologic surgery  He had a renal ultrasound 5/19/2021 which showed marked bladder distention, severe bilateral hydronephrosis, and no stones or tumors  elevated PSA:  His PSA was 4 74 5/20/2021  Free fraction was 16%  The following portions of the patient's history were reviewed and updated as appropriate: allergies, current medications, past family history, past medical history, past social history, past surgical history and problem list   Past Medical History:   Diagnosis Date    Hypertension     Urinary retention      Past Surgical History:   Procedure Laterality Date    CATARACT EXTRACTION      HIATAL HERNIA REPAIR      SHOULDER SURGERY Left      shoulder  Review of Systems   Review of Systems   Constitutional: Negative for fever  Respiratory: Negative for shortness of breath  Cardiovascular: Negative for chest pain  Genitourinary:        As per HPI  Has Britt catheter placed  Active Problem List     Patient Active Problem List   Diagnosis    [facility]ENNIFER [facility]) ([facility]    Stage 3b chronic kidney disease ([facility] )    Benign prostatic hyperplasia with incomplete bladder emptying    Essential hypertension    Hematuria       Objective   /80 (BP Location: Left arm, Patient Position: Sitting, Cuff Size: Adult)   Pulse 70   Ht 5' 3" (1 6 m)   Wt 73 9 kg (163 lb)   BMI 28 87 kg/m²     Physical Exam  Constitutional:       Appearance: Normal appearance  HENT:      Head: Normocephalic and atraumatic  Eyes:      Extraocular Movements: Extraocular movements intact  Cardiovascular:      Rate and Rhythm: Normal rate     Pulmonary:      Effort: Pulmonary effort is normal    Abdominal:      General: There is no distension  Palpations: Abdomen is soft  There is no mass  Tenderness: There is no abdominal tenderness  There is no right CVA tenderness, left CVA tenderness, guarding or rebound  Hernia: No hernia is present  Genitourinary:     Penis: Normal        Testes: Normal       Prostate: Normal       Rectum: Normal       Comments: Britt catheter in place, normal circumcised phallus  Testicles are descended bilaterally within normal limits  No hernia  Rectal exam reveals normal tone, no masses prostate about 30 g, smooth symmetric benign  No nodules  Musculoskeletal:         General: Normal range of motion  Cervical back: Normal range of motion  Skin:     Coloration: Skin is not jaundiced or pale  Neurological:      General: No focal deficit present  Mental Status: He is alert and oriented to person, place, and time  Comments: Very hard of hearing   Psychiatric:         Mood and Affect: Mood normal          Behavior: Behavior normal          Thought Content:  Thought content normal          Judgment: Judgment normal              Current Medications     Current Outpatient Medications:     finasteride (PROSCAR) 5 mg tablet, TAKE ONE TABLET BY MOUTH DAILY FOR PROSTATE (BPH), Disp: , Rfl:     FLUoxetine (PROzac) 10 mg capsule, TAKE 1 CAPSULE BY MOUTH EVERY MORNING FOR MOOD, Disp: , Rfl:     tamsulosin (FLOMAX) 0 4 mg, Take 1 capsule (0 4 mg total) by mouth daily with dinner for 15 days, Disp: 15 capsule, Rfl: 0    Zoster Vac Recomb Adjuvanted 50 MCG/0 5ML SUSR, INJECT 1 VIAL (0 5ML) INTRAMUSCULARLY ONCE **1ST DOSE**  GIVE FIRST DOSE NOW AND GIVE SECOND DOSE 2 TO 6 MONTHS AFTER INITIAL DOSE **1ST DOSE**  GIVE FIRST DOSE NOW AND GIVE SECOND DOSE 2 TO 6 MONTHS AFTER INITIAL DOSE, Disp: , Rfl:     cefdinir (OMNICEF) 300 mg capsule, TAKE 1 CAPSULE BY MOUTH DAILY, Disp: , Rfl:         Vivian Patricia MD

## 2021-06-15 ENCOUNTER — PREP FOR PROCEDURE (OUTPATIENT)
Dept: UROLOGY | Facility: MEDICAL CENTER | Age: 81
End: 2021-06-15

## 2021-06-15 ENCOUNTER — TELEPHONE (OUTPATIENT)
Dept: UROLOGY | Facility: MEDICAL CENTER | Age: 81
End: 2021-06-15

## 2021-06-15 ENCOUNTER — OFFICE VISIT (OUTPATIENT)
Dept: NEPHROLOGY | Facility: CLINIC | Age: 81
End: 2021-06-15
Payer: COMMERCIAL

## 2021-06-15 VITALS
HEART RATE: 60 BPM | SYSTOLIC BLOOD PRESSURE: 140 MMHG | BODY MASS INDEX: 28.95 KG/M2 | DIASTOLIC BLOOD PRESSURE: 78 MMHG | HEIGHT: 63 IN | OXYGEN SATURATION: 97 % | WEIGHT: 163.4 LBS

## 2021-06-15 DIAGNOSIS — R39.14 BENIGN PROSTATIC HYPERPLASIA WITH INCOMPLETE BLADDER EMPTYING: ICD-10-CM

## 2021-06-15 DIAGNOSIS — N13.9 OBSTRUCTIVE UROPATHY: ICD-10-CM

## 2021-06-15 DIAGNOSIS — N40.1 BENIGN PROSTATIC HYPERPLASIA WITH INCOMPLETE BLADDER EMPTYING: ICD-10-CM

## 2021-06-15 DIAGNOSIS — N17.9 AKI (ACUTE KIDNEY INJURY) (HCC): ICD-10-CM

## 2021-06-15 DIAGNOSIS — R39.89 SUSPECTED UTI: ICD-10-CM

## 2021-06-15 DIAGNOSIS — N18.32 STAGE 3B CHRONIC KIDNEY DISEASE (HCC): Primary | ICD-10-CM

## 2021-06-15 DIAGNOSIS — Z11.59 SPECIAL SCREENING EXAMINATION FOR UNSPECIFIED VIRAL DISEASE: ICD-10-CM

## 2021-06-15 DIAGNOSIS — N13.30 BILATERAL HYDRONEPHROSIS: Primary | ICD-10-CM

## 2021-06-15 PROCEDURE — 99213 OFFICE O/P EST LOW 20 MIN: CPT | Performed by: INTERNAL MEDICINE

## 2021-06-15 NOTE — TELEPHONE ENCOUNTER
I spoke to patient and scheduled surgery for 7/22 with Dr Kourtney Argueta at [facility], Patient will have a urine culture, EKG, and Type and screen prior to surgery  I am mailing him a surgery packet  He is scheduled for a post op appt with Fazal Long on 8/6 at [facility]

## 2021-06-15 NOTE — ASSESSMENT & PLAN NOTE
Lab Results   Component Value Date    EGFR 38 05/24/2021    EGFR 34 05/19/2021    CREATININE 1 68 (H) 05/24/2021    CREATININE 1 82 (H) 05/19/2021     Patient this time as presumed chronic kidney disease stage 3, will re-evaluate in 1 month to see if Britt catheter placement and drainage urine has resulted in improved kidney function  Patient may have sustained permanent kidney function loss due to the obstructive uropathy, time will tell

## 2021-06-15 NOTE — ASSESSMENT & PLAN NOTE
This is likely etiology of the patient's underlying chronic kidney disease  It is unclear the length of time the patient had the obstructive uropathy, management of this issue is currently with a Britt catheter  He is seeing Urology who will be proceeding with treatment in the near future

## 2021-06-15 NOTE — ASSESSMENT & PLAN NOTE
Continue current medications, Urology to perform surgical interventions as indicated  Britt catheter management according to their recommendations

## 2021-06-15 NOTE — PROGRESS NOTES
[facility] [place], [place]    Name: Braden Simpson  YOB: 1940      Assessment/Plan:    Stage 3b chronic kidney disease [facility])  Lab Results   Component Value Date    EGFR 38 05/24/2021    EGFR 34 05/19/2021    CREATININE 1 68 (H) 05/24/2021    CREATININE 1 82 (H) 05/19/2021     Patient this time as presumed chronic kidney disease stage 3, will re-evaluate in 1 month to see if Britt catheter placement and drainage urine has resulted in improved kidney function  Patient may have sustained permanent kidney function loss due to the obstructive uropathy, time will tell  Obstructive uropathy    This is likely etiology of the patient's underlying chronic kidney disease  It is unclear the length of time the patient had the obstructive uropathy, management of this issue is currently with a Britt catheter  He is seeing Urology who will be proceeding with treatment in the near future  Benign prostatic hyperplasia with incomplete bladder emptying    Continue current medications, Urology to perform surgical interventions as indicated  Britt catheter management according to their recommendations  Problem List Items Addressed This Visit        Genitourinary    JENNIFER (acute kidney injury) ([facility] [address] )    Stage 3b chronic kidney disease ([facility] [address] ) - Primary     Lab Results   Component Value Date    EGFR 38 05/24/2021    EGFR 34 05/19/2021    CREATININE 1 68 (H) 05/24/2021    CREATININE 1 82 (H) 05/19/2021     Patient this time as presumed chronic kidney disease stage 3, will re-evaluate in 1 month to see if Britt catheter placement and drainage urine has resulted in improved kidney function  Patient may have sustained permanent kidney function loss due to the obstructive uropathy, time will tell           Relevant Orders    Basic metabolic panel    Phosphorus    PTH, intact    Benign prostatic hyperplasia with incomplete bladder emptying       Continue current medications, Urology to perform surgical interventions as indicated  Britt catheter management according to their recommendations  Obstructive uropathy       This is likely etiology of the patient's underlying chronic kidney disease  It is unclear the length of time the patient had the obstructive uropathy, management of this issue is currently with a Britt catheter  He is seeing Urology who will be proceeding with treatment in the near future  Patient is stable and doing well at this time, we will see him back for regular appointment approximately 3 months  Labs to be done 1 month from now, an additional labs to be done prior to his appointment in 3 months  Subjective:      Patient ID: Joel Kirkland is a [de-identified] y o  male  Patient presents for follow-up regarding chronic kidney disease  Patient was initially evaluated due to potential acute kidney injury, he was found to have obstructive uropathy greater than 2 L of urine in the bladder is now status post Britt catheter placement  He saw our Urology colleagues who recommended trans urethral resection of the prostate with suprapubic catheter placement  The following portions of the patient's history were reviewed and updated as appropriate: allergies, current medications, past family history, past medical history, past social history, past surgical history and problem list     Review of Systems   All other systems reviewed and are negative  Social History     Socioeconomic History    Marital status:       Spouse name: None    Number of children: None    Years of education: None    Highest education level: None   Occupational History    None   Tobacco Use    Smoking status: Never Smoker    Smokeless tobacco: Current User     Types: Chew   Vaping Use    Vaping Use: Never used   Substance and Sexual Activity    Alcohol use: Never    Drug use: Never    Sexual activity: None   Other Topics Concern    None   Social History Narrative    None     Social Determinants of Health     Financial Resource Strain:     Difficulty of Paying Living Expenses:    Food Insecurity:     Worried About Running Out of Food in the Last Year:     [address] in the Last Year:    Transportation Needs:     Lack of Transportation (Medical):      Lack of Transportation (Non-Medical):    Physical Activity:     Days of Exercise per Week:     Minutes of Exercise per Session:    Stress:     Feeling of Stress :    Social Connections:     Frequency of Communication with Friends and Family:     Frequency of Social Gatherings with Friends and Family:     Attends [employer] Services:     Active Member of Clubs or Organizations:     Attends Club or Organization Meetings:     Marital Status:    Intimate Partner Violence:     Fear of Current or Ex-Partner:     Emotionally Abused:     Physically Abused:     Sexually Abused:      Past Medical History:   Diagnosis Date    Hypertension     Urinary retention      Past Surgical History:   Procedure Laterality Date    CATARACT EXTRACTION      HIATAL HERNIA REPAIR      SHOULDER SURGERY Left        Current Outpatient Medications:     cefdinir (OMNICEF) 300 mg capsule, TAKE 1 CAPSULE BY MOUTH DAILY, Disp: , Rfl:     finasteride (PROSCAR) 5 mg tablet, TAKE ONE TABLET BY MOUTH DAILY FOR PROSTATE (BPH), Disp: , Rfl:     FLUoxetine (PROzac) 10 mg capsule, TAKE 1 CAPSULE BY MOUTH EVERY MORNING FOR MOOD, Disp: , Rfl:     tamsulosin (FLOMAX) 0 4 mg, Take 1 capsule (0 4 mg total) by mouth daily with dinner for 15 days, Disp: 15 capsule, Rfl: 0    Zoster Vac Recomb Adjuvanted 50 MCG/0 5ML SUSR, INJECT 1 VIAL (0 5ML) INTRAMUSCULARLY ONCE **1ST DOSE**  GIVE FIRST DOSE NOW AND GIVE SECOND DOSE 2 TO 6 MONTHS AFTER INITIAL DOSE **1ST DOSE**  GIVE FIRST DOSE NOW AND GIVE SECOND DOSE 2 TO 6 MONTHS AFTER INITIAL DOSE, Disp: , Rfl:     Lab Results   Component Value Date    SODIUM 138 05/24/2021 K 4 5 05/24/2021     05/24/2021    CO2 29 05/24/2021    AGAP 2 (L) 05/24/2021    BUN 31 (H) 05/24/2021    CREATININE 1 68 (H) 05/24/2021    GLUC 84 05/24/2021    CALCIUM 9 0 05/24/2021    AST 11 05/24/2021    ALT 15 05/24/2021    ALKPHOS 75 05/24/2021    TP 7 5 05/24/2021    TP 7 3 05/24/2021    TBILI 0 49 05/24/2021    EGFR 38 05/24/2021     Lab Results   Component Value Date    WBC 8 60 05/24/2021    HGB 12 9 05/24/2021    HCT 38 9 05/24/2021    MCV 98 05/24/2021     (L) 05/24/2021     No results found for: CHOLESTEROL  No results found for: HDL  No results found for: LDLCALC  No results found for: TRIG  No results found for: CHOLHDL  No results found for: [id number], TSH  Lab Results   Component Value Date    PTH 55 4 05/24/2021    CALCIUM 9 0 05/24/2021    PHOS 3 9 05/24/2021     Lab Results   Component Value Date    SPEP See Comment 05/24/2021    UPEP See Comment 05/24/2021     No results found for: KELLY ROSS        Objective:      /78   Pulse 60   Ht 5' 3" (1 6 m)   Wt 74 1 kg (163 lb 6 4 oz)   SpO2 97%   BMI 28 95 kg/m²          Physical Exam  Vitals reviewed  Constitutional:       General: He is not in acute distress  Appearance: He is well-developed  HENT:      Head: Normocephalic and atraumatic  Eyes:      Conjunctiva/sclera: Conjunctivae normal    Cardiovascular:      Rate and Rhythm: Normal rate and regular rhythm  Pulmonary:      Effort: Pulmonary effort is normal       Breath sounds: Normal breath sounds  Abdominal:      Palpations: Abdomen is soft  Musculoskeletal:      Cervical back: Neck supple  Skin:     General: Skin is warm  Findings: No rash  Neurological:      Mental Status: He is alert and oriented to person, place, and time  Cranial Nerves: No cranial nerve deficit     Psychiatric:         Behavior: Behavior normal

## 2021-06-16 ENCOUNTER — HOSPITAL ENCOUNTER (OUTPATIENT)
Dept: ULTRASOUND IMAGING | Facility: HOSPITAL | Age: 81
Discharge: HOME | End: 2021-06-16
Attending: UROLOGY
Payer: COMMERCIAL

## 2021-06-16 DIAGNOSIS — R33.9 URINARY RETENTION: ICD-10-CM

## 2021-06-16 DIAGNOSIS — N40.1 BPH WITH OBSTRUCTION/LOWER URINARY TRACT SYMPTOMS: ICD-10-CM

## 2021-06-16 DIAGNOSIS — N13.8 BPH WITH OBSTRUCTION/LOWER URINARY TRACT SYMPTOMS: ICD-10-CM

## 2021-06-16 DIAGNOSIS — N13.30 BILATERAL HYDRONEPHROSIS: ICD-10-CM

## 2021-06-16 PROCEDURE — 76770 US EXAM ABDO BACK WALL COMP: CPT

## 2021-06-28 ENCOUNTER — HOSPITAL ENCOUNTER (EMERGENCY)
Facility: HOSPITAL | Age: 81
Discharge: HOME | End: 2021-06-28
Attending: FAMILY MEDICINE
Payer: COMMERCIAL

## 2021-06-28 ENCOUNTER — TELEPHONE (OUTPATIENT)
Dept: UROLOGY | Facility: MEDICAL CENTER | Age: 81
End: 2021-06-28

## 2021-06-28 ENCOUNTER — APPOINTMENT (EMERGENCY)
Dept: CT IMAGING | Facility: HOSPITAL | Age: 81
End: 2021-06-28
Payer: COMMERCIAL

## 2021-06-28 VITALS
SYSTOLIC BLOOD PRESSURE: 121 MMHG | OXYGEN SATURATION: 93 % | TEMPERATURE: 98.5 F | HEART RATE: 58 BPM | RESPIRATION RATE: 18 BRPM | DIASTOLIC BLOOD PRESSURE: 64 MMHG

## 2021-06-28 DIAGNOSIS — N39.0 UTI (URINARY TRACT INFECTION): Primary | ICD-10-CM

## 2021-06-28 DIAGNOSIS — N39.0 CATHETER-ASSOCIATED URINARY TRACT INFECTION (HCC): ICD-10-CM

## 2021-06-28 DIAGNOSIS — T83.511A CATHETER-ASSOCIATED URINARY TRACT INFECTION (HCC): ICD-10-CM

## 2021-06-28 LAB
ALBUMIN SERPL BCP-MCNC: 3.6 G/DL (ref 3.5–5.7)
ALP SERPL-CCNC: 51 U/L (ref 55–165)
ALT SERPL W P-5'-P-CCNC: 9 U/L (ref 7–52)
ANION GAP SERPL CALCULATED.3IONS-SCNC: 7 MMOL/L (ref 4–13)
AST SERPL W P-5'-P-CCNC: 10 U/L (ref 13–39)
BACTERIA UR QL AUTO: ABNORMAL /HPF
BASOPHILS # BLD AUTO: 0.1 THOUSANDS/ΜL (ref 0–0.1)
BASOPHILS NFR BLD AUTO: 1 % (ref 0–2)
BILIRUB SERPL-MCNC: 0.4 MG/DL (ref 0.2–1)
BILIRUB UR QL STRIP: NEGATIVE
BUN SERPL-MCNC: 26 MG/DL (ref 7–25)
CALCIUM SERPL-MCNC: 8.8 MG/DL (ref 8.6–10.5)
CHLORIDE SERPL-SCNC: 106 MMOL/L (ref 98–107)
CLARITY UR: ABNORMAL
CO2 SERPL-SCNC: 24 MMOL/L (ref 21–31)
COLOR UR: YELLOW
CREAT SERPL-MCNC: 1.86 MG/DL (ref 0.7–1.3)
EOSINOPHIL # BLD AUTO: 0.4 THOUSAND/ΜL (ref 0–0.61)
EOSINOPHIL NFR BLD AUTO: 7 % (ref 0–5)
ERYTHROCYTE [DISTWIDTH] IN BLOOD BY AUTOMATED COUNT: 13.5 % (ref 11.5–14.5)
GFR SERPL CREATININE-BSD FRML MDRD: 33 ML/MIN/1.73SQ M
GLUCOSE SERPL-MCNC: 126 MG/DL (ref 65–99)
GLUCOSE UR STRIP-MCNC: NEGATIVE MG/DL
HCT VFR BLD AUTO: 36.7 % (ref 42–47)
HGB BLD-MCNC: 12.2 G/DL (ref 14–18)
HGB UR QL STRIP.AUTO: ABNORMAL
KETONES UR STRIP-MCNC: NEGATIVE MG/DL
LEUKOCYTE ESTERASE UR QL STRIP: ABNORMAL
LYMPHOCYTES # BLD AUTO: 1.7 THOUSANDS/ΜL (ref 0.6–4.47)
LYMPHOCYTES NFR BLD AUTO: 30 % (ref 21–51)
MAGNESIUM SERPL-MCNC: 1.8 MG/DL (ref 1.9–2.7)
MCH RBC QN AUTO: 31.1 PG (ref 26–34)
MCHC RBC AUTO-ENTMCNC: 33.4 G/DL (ref 31–37)
MCV RBC AUTO: 93 FL (ref 81–99)
MONOCYTES # BLD AUTO: 0.6 THOUSAND/ΜL (ref 0.17–1.22)
MONOCYTES NFR BLD AUTO: 10 % (ref 2–12)
NEUTROPHILS # BLD AUTO: 3 THOUSANDS/ΜL (ref 1.4–6.5)
NEUTS SEG NFR BLD AUTO: 52 % (ref 42–75)
NITRITE UR QL STRIP: POSITIVE
NON-SQ EPI CELLS URNS QL MICRO: ABNORMAL /HPF
PH UR STRIP.AUTO: 6 [PH]
PLATELET # BLD AUTO: 158 THOUSANDS/UL (ref 149–390)
PMV BLD AUTO: 7 FL (ref 8.6–11.7)
POTASSIUM SERPL-SCNC: 3.8 MMOL/L (ref 3.5–5.5)
PROT SERPL-MCNC: 6.6 G/DL (ref 6.4–8.9)
PROT UR STRIP-MCNC: ABNORMAL MG/DL
RBC # BLD AUTO: 3.94 MILLION/UL (ref 4.3–5.9)
RBC #/AREA URNS AUTO: ABNORMAL /HPF
SODIUM SERPL-SCNC: 137 MMOL/L (ref 134–143)
SP GR UR STRIP.AUTO: 1.02 (ref 1–1.03)
UROBILINOGEN UR QL STRIP.AUTO: 0.2 E.U./DL
WBC # BLD AUTO: 5.8 THOUSAND/UL (ref 4.8–10.8)
WBC #/AREA URNS AUTO: ABNORMAL /HPF

## 2021-06-28 PROCEDURE — 99284 EMERGENCY DEPT VISIT MOD MDM: CPT | Performed by: PHYSICIAN ASSISTANT

## 2021-06-28 PROCEDURE — 80053 COMPREHEN METABOLIC PANEL: CPT | Performed by: PHYSICIAN ASSISTANT

## 2021-06-28 PROCEDURE — 87186 SC STD MICRODIL/AGAR DIL: CPT | Performed by: PHYSICIAN ASSISTANT

## 2021-06-28 PROCEDURE — 96367 TX/PROPH/DG ADDL SEQ IV INF: CPT

## 2021-06-28 PROCEDURE — 70450 CT HEAD/BRAIN W/O DYE: CPT

## 2021-06-28 PROCEDURE — G1004 CDSM NDSC: HCPCS

## 2021-06-28 PROCEDURE — 96365 THER/PROPH/DIAG IV INF INIT: CPT

## 2021-06-28 PROCEDURE — 85025 COMPLETE CBC W/AUTO DIFF WBC: CPT | Performed by: PHYSICIAN ASSISTANT

## 2021-06-28 PROCEDURE — 99284 EMERGENCY DEPT VISIT MOD MDM: CPT

## 2021-06-28 PROCEDURE — 87077 CULTURE AEROBIC IDENTIFY: CPT | Performed by: PHYSICIAN ASSISTANT

## 2021-06-28 PROCEDURE — 87086 URINE CULTURE/COLONY COUNT: CPT | Performed by: PHYSICIAN ASSISTANT

## 2021-06-28 PROCEDURE — 83735 ASSAY OF MAGNESIUM: CPT | Performed by: PHYSICIAN ASSISTANT

## 2021-06-28 PROCEDURE — 36415 COLL VENOUS BLD VENIPUNCTURE: CPT | Performed by: PHYSICIAN ASSISTANT

## 2021-06-28 PROCEDURE — 81001 URINALYSIS AUTO W/SCOPE: CPT | Performed by: PHYSICIAN ASSISTANT

## 2021-06-28 PROCEDURE — 93005 ELECTROCARDIOGRAM TRACING: CPT

## 2021-06-28 RX ORDER — CEFTRIAXONE 1 G/50ML
1000 INJECTION, SOLUTION INTRAVENOUS ONCE
Status: COMPLETED | OUTPATIENT
Start: 2021-06-28 | End: 2021-06-28

## 2021-06-28 RX ORDER — CEPHALEXIN 500 MG/1
500 CAPSULE ORAL EVERY 6 HOURS SCHEDULED
Qty: 28 CAPSULE | Refills: 0 | Status: SHIPPED | OUTPATIENT
Start: 2021-06-28 | End: 2021-07-05

## 2021-06-28 RX ORDER — MAGNESIUM SULFATE 1 G/100ML
1 INJECTION INTRAVENOUS ONCE
Status: COMPLETED | OUTPATIENT
Start: 2021-06-28 | End: 2021-06-28

## 2021-06-28 RX ORDER — CEPHALEXIN 500 MG/1
500 CAPSULE ORAL EVERY 12 HOURS SCHEDULED
Qty: 10 CAPSULE | Refills: 0 | Status: SHIPPED | OUTPATIENT
Start: 2021-06-28 | End: 2021-06-28 | Stop reason: SDUPTHER

## 2021-06-28 RX ADMIN — MAGNESIUM SULFATE HEPTAHYDRATE 1 G: 1 INJECTION, SOLUTION INTRAVENOUS at 10:39

## 2021-06-28 RX ADMIN — CEFTRIAXONE 1000 MG: 1 INJECTION, SOLUTION INTRAVENOUS at 10:01

## 2021-06-28 RX ADMIN — SODIUM CHLORIDE 1000 ML: 0.9 INJECTION, SOLUTION INTRAVENOUS at 09:52

## 2021-06-28 NOTE — TELEPHONE ENCOUNTER
pts son calling stating pts urine is white and cloudy  pts son wants pt to be seen today  Attempt was made to triage pts son regarding pts symptoms  pts son declined triage and urine testing stating " I dont have time for all this, I will just take him to the ER " and hung up  Please be advised  Thank you

## 2021-06-28 NOTE — ED PROVIDER NOTES
History  Chief Complaint   Patient presents with    Possible UTI     Patient noticed cloudy urine Friday  No burning or pain   Dizziness     Ongoing for months     81-year-old male presents to the emergency department accompanied by his son seeking evaluation for discolored urine  Patient was seen previously for urinary retention and a Britt catheter placed  He is scheduled to undergo a TURP in the coming weeks  The son notes that the patient's urine has become very cloudy within the last few days  Concern for UTI  The patient's son also reports that the patient has been experiencing episodes of dizziness or lightheadedness over the past several weeks to months  The patient is reported to [facility] his head every once in a while and states that he just does not feel right    Patient is well-appearing in no acute distress  Patient denies any active complaints  The patient does not provide much of his history  He deferred history to his son  Allergies reviewed          Prior to Admission Medications   Prescriptions Last Dose Informant Patient Reported? Taking?    FLUoxetine (PROzac) 10 mg capsule 6/28/2021 at Unknown time Family Member Yes Yes   Sig: TAKE 1 CAPSULE BY MOUTH EVERY MORNING FOR MOOD   Zoster Vac Recomb Adjuvanted 48 MCG/0 5ML SUSR  Family Member Yes Yes   Sig: INJECT 1 VIAL (0 5ML) INTRAMUSCULARLY ONCE **1ST DOSE**  GIVE FIRST DOSE NOW AND GIVE SECOND DOSE 2 TO 6 MONTHS AFTER INITIAL DOSE **1ST DOSE**  GIVE FIRST DOSE NOW AND GIVE SECOND DOSE 2 TO 6 MONTHS AFTER INITIAL DOSE   cefdinir (OMNICEF) 300 mg capsule Not Taking at Unknown time Family Member Yes No   Sig: TAKE 1 CAPSULE BY MOUTH DAILY   Patient not taking: Reported on 6/28/2021   finasteride (PROSCAR) 5 mg tablet 6/28/2021 at Unknown time Family Member Yes Yes   Sig: TAKE ONE TABLET BY MOUTH DAILY FOR PROSTATE (BPH)   tamsulosin (FLOMAX) 0 4 mg  Family Member No No   Sig: Take 1 capsule (0 4 mg total) by mouth daily with dinner for 15 days      Facility-Administered Medications: None       Past Medical History:   Diagnosis Date    Hypertension     Urinary retention        Past Surgical History:   Procedure Laterality Date    CATARACT EXTRACTION      HIATAL HERNIA REPAIR      SHOULDER SURGERY Left        History reviewed  No pertinent family history  I have reviewed and agree with the history as documented  E-Cigarette/Vaping    E-Cigarette Use Never User      E-Cigarette/Vaping Substances     Social History     Tobacco Use    Smoking status: Never Smoker    Smokeless tobacco: Current User     Types: Chew   Vaping Use    Vaping Use: Never used   Substance Use Topics    Alcohol use: Never    Drug use: Never       Review of Systems   Constitutional: Negative for chills, fatigue and fever  HENT: Negative for congestion, ear pain, rhinorrhea, sinus pressure, sneezing and sore throat  Eyes: Negative for pain and discharge  Respiratory: Negative for cough, choking, chest tightness, shortness of breath and wheezing  Cardiovascular: Negative for chest pain and palpitations  Gastrointestinal: Negative for abdominal pain, constipation, diarrhea, nausea and vomiting  Genitourinary: Negative for difficulty urinating and dysuria  Musculoskeletal: Negative for back pain, gait problem, neck pain and neck stiffness  Neurological: Negative for dizziness, light-headedness and headaches  All other systems reviewed and are negative  Physical Exam  Physical Exam  Vitals and nursing note reviewed  Constitutional:       General: He is not in acute distress  Appearance: He is well-developed  He is not ill-appearing  HENT:      Head: Normocephalic and atraumatic  Right Ear: External ear normal       Left Ear: External ear normal       Nose: Nose normal    Eyes:      Pupils: Pupils are equal, round, and reactive to light  Cardiovascular:      Rate and Rhythm: Normal rate and regular rhythm        Heart sounds: Normal heart sounds  No murmur heard  No friction rub  No gallop  Pulmonary:      Effort: Pulmonary effort is normal  No respiratory distress  Breath sounds: Normal breath sounds  No stridor  No wheezing or rales  Abdominal:      General: Bowel sounds are normal  There is no distension  Palpations: Abdomen is soft  Tenderness: There is no abdominal tenderness  There is no guarding  Musculoskeletal:         General: No tenderness  Normal range of motion  Cervical back: Normal range of motion and neck supple  Skin:     General: Skin is warm  Capillary Refill: Capillary refill takes less than 2 seconds  Neurological:      Mental Status: He is alert and oriented to person, place, and time  Psychiatric:         Behavior: Behavior is cooperative           Vital Signs  ED Triage Vitals [06/28/21 0923]   Temperature Pulse Respirations Blood Pressure SpO2   98 5 °F (36 9 °C) 94 18 122/68 97 %      Temp Source Heart Rate Source Patient Position - Orthostatic VS BP Location FiO2 (%)   Tympanic Monitor Sitting Right arm --      Pain Score       --           Vitals:    06/28/21 0923 06/28/21 1100   BP: 122/68 121/64   Pulse: 94 58   Patient Position - Orthostatic VS: Sitting          Visual Acuity      ED Medications  Medications   sodium chloride 0 9 % bolus 1,000 mL (0 mL Intravenous Stopped 6/28/21 1125)   cefTRIAXone (ROCEPHIN) IVPB (premix in dextrose) 1,000 mg 50 mL (0 mg Intravenous Stopped 6/28/21 1033)   magnesium sulfate IVPB (premix) SOLN 1 g (0 g Intravenous Stopped 6/28/21 1118)       Diagnostic Studies  Results Reviewed     Procedure Component Value Units Date/Time    Urine Microscopic [[id number]]  (Abnormal) Collected: 06/28/21 0937    Lab Status: Final result Specimen: Urine, Indwelling Britt Catheter Updated: 06/28/21 1033     RBC, UA       Field obscured, unable to enumerate     /hpf     WBC, UA Innumerable /hpf      Epithelial Cells None Seen /hpf      Bacteria, UA Innumerable /hpf     Urine culture [[id number]] Collected: 06/28/21 0937    Lab Status:  In process Specimen: Urine, Indwelling Britt Catheter Updated: 06/28/21 1033    Magnesium [[id number]]  (Abnormal) Collected: 06/28/21 0952    Lab Status: Final result Specimen: Blood from Arm, Right Updated: 06/28/21 1018     Magnesium 1 8 mg/dL     Comprehensive metabolic panel [[id number]]  (Abnormal) Collected: 06/28/21 0952    Lab Status: Final result Specimen: Blood from Arm, Right Updated: 06/28/21 1018     Sodium 137 mmol/L      Potassium 3 8 mmol/L      Chloride 106 mmol/L      CO2 24 mmol/L      ANION GAP 7 mmol/L      BUN 26 mg/dL      Creatinine 1 86 mg/dL      Glucose 126 mg/dL      Calcium 8 8 mg/dL      AST 10 U/L      ALT 9 U/L      Alkaline Phosphatase 51 U/L      Total Protein 6 6 g/dL      Albumin 3 6 g/dL      Total Bilirubin 0 40 mg/dL      eGFR 33 ml/min/1 73sq m     Narrative:      National Kidney Disease Foundation guidelines for Chronic Kidney Disease (CKD):     Stage 1 with normal or high GFR (GFR > 90 mL/min/1 73 square meters)    Stage 2 Mild CKD (GFR = 60-89 mL/min/1 73 square meters)    Stage 3A Moderate CKD (GFR = 45-59 mL/min/1 73 square meters)    Stage 3B Moderate CKD (GFR = 30-44 mL/min/1 73 square meters)    Stage 4 Severe CKD (GFR = 15-29 mL/min/1 73 square meters)    Stage 5 End Stage CKD (GFR <15 mL/min/1 73 square meters)  Note: GFR calculation is accurate only with a steady state creatinine    CBC and differential [[id number]]  (Abnormal) Collected: 06/28/21 0952    Lab Status: Final result Specimen: Blood from Arm, Right Updated: 06/28/21 1003     WBC 5 80 Thousand/uL      RBC 3 94 Million/uL      Hemoglobin 12 2 g/dL      Hematocrit 36 7 %      MCV 93 fL      MCH 31 1 pg      MCHC 33 4 g/dL      RDW 13 5 %      MPV 7 0 fL      Platelets 777 Thousands/uL      Neutrophils Relative 52 %      Lymphocytes Relative 30 %      Monocytes Relative 10 %      Eosinophils Relative 7 % Basophils Relative 1 %      Neutrophils Absolute 3 00 Thousands/µL      Lymphocytes Absolute 1 70 Thousands/µL      Monocytes Absolute 0 60 Thousand/µL      Eosinophils Absolute 0 40 Thousand/µL      Basophils Absolute 0 10 Thousands/µL     UA w Reflex to Microscopic w Reflex to Culture [[id number]]  (Abnormal) Collected: 06/28/21 0937    Lab Status: Final result Specimen: Urine, Indwelling Britt Catheter Updated: 06/28/21 0944     Color, UA Yellow     Clarity, UA Cloudy     Specific [place], UA 1 020     pH, UA 6 0     Leukocytes, UA 3+     Nitrite, UA Positive     Protein, UA 2+ mg/dl      Glucose, UA Negative mg/dl      Ketones, UA Negative mg/dl      Urobilinogen, UA 0 2 E U /dl      Bilirubin, UA Negative     Blood, UA 3+                 CT head without contrast   Final Result by Talya Morales MD (06/28 1028)      No acute intracranial abnormality  Workstation performed: [id number]                    Procedures  Procedures         ED Course  ED Course as of Jun 28 1254   Mon Jun 28, 2021   0574 Nitrite, UA(!): Positive                             SBIRT 20yo+      Most Recent Value   SBIRT (23 yo +)   In order to provide better care to our patients, we are screening all of our patients for alcohol and drug use  Would it be okay to ask you these screening questions? Yes Filed at: 06/28/2021 6155   Initial Alcohol Screen: US AUDIT-C    1  How often do you have a drink containing alcohol?  0 Filed at: 06/28/2021 0926   2  How many drinks containing alcohol do you have on a typical day you are drinking? 0 Filed at: 06/28/2021 0926   3a  Male UNDER 65: How often do you have five or more drinks on one occasion? 0 Filed at: 06/28/2021 0926   3b  FEMALE Any Age, or MALE 65+: How often do you have 4 or more drinks on one occassion? 0 Filed at: 06/28/2021 0926   Audit-C Score  0 Filed at: 06/28/2021 1870   LUCY: How many times in the past year have you       Used an illegal drug or used a prescription medication for non-medical reasons? Never Filed at: 06/28/2021 0547                    MDM  Number of Diagnoses or Management Options  Catheter-associated urinary tract infection [facility]): new and requires workup  UTI (urinary tract infection): new and requires workup  Diagnosis management comments: UTI  Rocephin given  No clear etiology for the reported episodes of lightheadedness and or dizziness that have been ongoing for the last several months  Recommend continue follow-up with PCP  Patient educated regarding their diagnosis and given return and follow-up instructions  Patient was advised to returned to the ED with worsening symptoms or concerns  Patient is understanding of and in agreement with the treatment plan  There are no questions at the time of discharge  Amount and/or Complexity of Data Reviewed  Clinical lab tests: ordered and reviewed  Tests in the radiology section of CPT®: ordered and reviewed    Risk of Complications, Morbidity, and/or Mortality  Presenting problems: moderate  Diagnostic procedures: low  Management options: low    Patient Progress  Patient progress: stable      Disposition  Final diagnoses:   UTI (urinary tract infection)   Catheter-associated urinary tract infection ([facility] [address] )     Time reflects when diagnosis was documented in both MDM as applicable and the Disposition within this note     Time User Action Codes Description Comment    6/28/2021 10:09 AM Shana Small Add [N39 0] UTI (urinary tract infection)     6/28/2021 11:19 AM Shana Small Add [U02 582T,  N39 0] Catheter-associated urinary tract infection [facility])       ED Disposition     ED Disposition Condition Date/Time Comment    Discharge Stable Mon Jun 28, 2021 10:38 AM Deb Dillon discharge to home/self care              Follow-up Information    None         Discharge Medication List as of 6/28/2021 11:19 AM      CONTINUE these medications which have CHANGED    Details   cephalexin (KEFLEX) 500 mg capsule Take 1 capsule (500 mg total) by mouth every 6 (six) hours for 7 days, Starting Mon 6/28/2021, Until Mon 7/5/2021, Normal         CONTINUE these medications which have NOT CHANGED    Details   finasteride (PROSCAR) 5 mg tablet TAKE ONE TABLET BY MOUTH DAILY FOR PROSTATE (BPH), Historical Med      FLUoxetine (PROzac) 10 mg capsule TAKE 1 CAPSULE BY MOUTH EVERY MORNING FOR MOOD, Historical Med      Zoster Vac Recomb Adjuvanted 50 MCG/0 5ML SUSR INJECT 1 VIAL (0 5ML) INTRAMUSCULARLY ONCE **1ST DOSE**  GIVE FIRST DOSE NOW AND GIVE SECOND DOSE 2 TO 6 MONTHS AFTER INITIAL DOSE **1ST DOSE**  GIVE FIRST DOSE NOW AND GIVE SECOND DOSE 2 TO 6 MONTHS AFTER INITIAL DOSE, Historical Med      cefdinir (OMNICEF) 300 mg capsule TAKE 1 CAPSULE BY MOUTH DAILY, Historical Med      tamsulosin (FLOMAX) 0 4 mg Take 1 capsule (0 4 mg total) by mouth daily with dinner for 15 days, Starting Wed 5/19/2021, Until Tue 6/15/2021, Normal           No discharge procedures on file      PDMP Review     None          ED Provider  Electronically Signed by           Roxana Cabrera PA-C  06/28/21 4791

## 2021-06-29 LAB
ATRIAL RATE: 66 BPM
P AXIS: 37 DEGREES
PR INTERVAL: 260 MS
QRS AXIS: 8 DEGREES
QRSD INTERVAL: 82 MS
QT INTERVAL: 372 MS
QTC INTERVAL: 389 MS
T WAVE AXIS: 45 DEGREES
VENTRICULAR RATE: 66 BPM

## 2021-06-29 PROCEDURE — 93010 ELECTROCARDIOGRAM REPORT: CPT | Performed by: INTERNAL MEDICINE

## 2021-07-01 RX ORDER — CIPROFLOXACIN 500 MG/1
500 TABLET, FILM COATED ORAL 2 TIMES DAILY
Qty: 14 TABLET | Refills: 0 | Status: SHIPPED | OUTPATIENT
Start: 2021-07-01 | End: 2021-07-08

## 2021-07-02 LAB
BACTERIA UR CULT: ABNORMAL

## 2021-07-16 ENCOUNTER — ANESTHESIA EVENT (OUTPATIENT)
Dept: PERIOP | Facility: HOSPITAL | Age: 81
End: 2021-07-16
Payer: COMMERCIAL

## 2021-07-16 RX ORDER — MELATONIN
2000 DAILY
COMMUNITY

## 2021-07-16 NOTE — PRE-PROCEDURE INSTRUCTIONS
Pre-Surgery Instructions:   Medication Instructions    cholecalciferol (VITAMIN D3) 1,000 units tablet stop taking 7 days prior to surgery    finasteride (PROSCAR) 5 mg tablet Continue to take this medication on your normal schedule  If this is an oral medication and you take it in the morning, then you may take this medicine with a sip of water   FLUoxetine (PROzac) 10 mg capsule Continue to take this medication on your normal schedule  If this is an oral medication and you take it in the morning, then you may take this medicine with a sip of water   tamsulosin (FLOMAX) 0 4 mg Continue to take this medication on your normal schedule  If this is an oral medication and you take it in the morning, then you may take this medicine with a sip of water  Covid screening negative as per patient  Reviewed with patient,s son, Sandee Quiroz, via phone all medication and showering instructions  Advised not to take any NSAID's, Vitamins or Herbal products prior to the DOS  Acetaminophen products are ok to take  Instructed about NPO after midnight the night before DOS, except sips of water with allowed medications on the DOS  Informed about call from [facility] with the time to arrive for the scheduled surgery  Patient verbalized understanding

## 2021-07-21 NOTE — OP NOTE
Fidel Valentin    [id number]    Date: 7/22/2021    Preoperative Diagnosis:Pre-Op Diagnosis Codes:     * Bilateral hydronephrosis [N13 30]     * Urinary retention [R33 9]     * BPH with obstruction/lower urinary tract symptoms [N40 1, N13 8]    Postoperative Diagnosis: same    Procedure: Cystoscopy, Transurethral resection of the Prostate, suprapubic tube placement    Surgeon: Cheikh Ryan MD    First Assistant: None                                                       Resident:None    Anesthesia: LMA    EBL: Minimal    Specimens:Prostate Chips    Findings:  Mostly right-sided lobar hypertrophy  Shows signs of previous resection  Large capacity bladder, no tumors  Resected both sides  Complications:None      Course of Procedure: Fidel Valentin is  An 73-year-old man with BPH with obstruction with urinary retention and bilateral hydronephrosis, with 2 L retention and acute kidney injury  who has been scheduled for cystoscopy and transurethral resection of the prostate , along with suprapubic tube placement in case his bladder is defunctionalized    The risks of bleeding, infection, damage to the urinary tract and adjacent organs, retrograde ejaculation, incontinence, irritative voiding symptoms were discussed with the patient and informed consent was given  The patient was brought to the operating room and identified  After general anesthesia was induced, the patient was prepared and draped in the dorsolithotomy position in the usual fashion, with standard care taken to protect pressure points  A time out was performed  I first placed suprapubic tube  I placed a 25 Bangladeshi cystoscopy sheath into the bladder via the urethra and noticed that he had friable vessels on the surface of the prostate and the right lobe of the prostate cross the midline  He shows signs of previous resection prostate  The bladder itself was smooth and very large capacity  No tumors or stones    I pointed the scope at the dome of the bladder and made an incision a finger breath above the pubic symphysis on the skin  I placed the [place] kit needle through this into the bladder and then placed a wire  I then dilated with the inner dilator, then I took the inner dilator and with the sheath and placed this into the bladder under direct vision  I removed the inner sheath and I placed a 16 Albanian Britt catheter through this and inflated the balloon  I removed the inner sheath intact by stripping away  This was later secured with 2-0 nylon suture  The 24 Albanian resectoscope sheath was introduced into the bladder with the obturator  Quick Cystoscopy was peformed with the 30 degree lens  The bladder was nontrabeculated, and there was not a large median lobe  The bladder was drained often during the procedure to avoid hyperdistension  The resectoscope was introduced, and resection begun, and the prostate was resected circumferentially from the bladder neck to the verumontanum, with care being taken not to resect the Veru or resect distal to it  Once an adequate resection was obtained, all of the chips were evacuated with the [facility] evacuator, and hemostasis achieved with electrocautery  When I was satisfied with hemostasis,a 24 Malay 3 way hematuria catheter was placed, the balloon inflated, and the catheter placed to slight traction  The suprapubic catheter was plugged The patient was awakened and transferred to the PACU in good condition

## 2021-07-21 NOTE — H&P
Loli Hill MD   Physician   Specialty:  Urology   H&P      Signed   Encounter Date:  2021            H&P-  Updated 2021         [address] [facility]  [address] [place]  [place], [address] [place] [address]  [phone]  www  [facility]  org        NAME: Parth Dacosta  AGE: [de-identified] y o  SEX: male  : 1940            MRN: [id number]     DATE: 2021  TIME: 3:32 PM     Assessment and Plan:     BPH with obstruction with urinary retention with severe bilateral hydronephrosis and acute kidney injury  2 L retention  We discussed the situation and he has been on medications for several years and the only options we have for surgical   I offered trans urethral resection of prostate with suprapubic tube placement and described the process along with the risks of bleeding infection urinary incontinence damage to adjacent organs and impotence  Also the possible need for blood transfusion  He understands and he wishes to proceed  Informed consent given  No need for urodynamic studies given that he will need some form of drainage afterwards such as suprapubic tube anyway  Repeat renal ultrasound to make sure there is resolution of the hydronephrosis                    Chief Complaint          Chief Complaint   Patient presents with   [facility] Patient Visit         History of Present Illness   New patient office visit:  78-year-old man with a history of urinary retention status post multiple voiding trial attempts  Is on Flomax b i d  and finasteride  Has been seeing Urology at the [facility] who would like for him to have a urodynamic study and possible TURP  He was double book for an expedited appointment  Urine culture 2021 showed no growth  He has been seen in our emergency department twice  2021 he had 2000 cc in his bladder  Creatinine was 1 82 at that time    Although his culture was negative, he had innumerable white blood cells and occasional bacteria  CBC showed hemoglobin 11 9, white blood count 7800 and his platelets were slightly low at 137,000   Repeat labs 5/24/2021 showed creatinine down to 1 68  CBC on that date showed hemoglobin 12 9 and his platelets were 401,404  Problem has been going on for couple of years where he has been having difficulty urinating, having many episodes of nocturia, and difficulty voiding  The first time he had known retention was 5/19/2021 when he was found have 2 L in the bladder  No previous urologic surgery  He had a renal ultrasound 5/19/2021 which showed marked bladder distention, severe bilateral hydronephrosis, and no stones or tumors       elevated PSA:  His PSA was 4 74 5/20/2021  Free fraction was 16%         The following portions of the patient's history were reviewed and updated as appropriate: allergies, current medications, past family history, past medical history, past social history, past surgical history and problem list        Past Medical History:   Diagnosis Date    Hypertension      Urinary retention              Past Surgical History:   Procedure Laterality Date    CATARACT EXTRACTION        HIATAL HERNIA REPAIR        SHOULDER SURGERY Left        shoulder  Review of Systems   Review of Systems   Constitutional: Negative for fever  Respiratory: Negative for shortness of breath  Cardiovascular: Negative for chest pain  Genitourinary:        As per HPI    Has Britt catheter placed          Active Problem List          Patient Active Problem List   Diagnosis    JENNIFER (acute kidney injury) ([address] )    Stage 3b chronic kidney disease ([address] )    Benign prostatic hyperplasia with incomplete bladder emptying    Essential hypertension    Hematuria         Objective   /80 (BP Location: Left arm, Patient Position: Sitting, Cuff Size: Adult)   Pulse 70   Ht 5' 3" (1 6 m)   Wt 73 9 kg (163 lb)   BMI 28 87 kg/m²      Physical Exam  Constitutional:       Appearance: Normal appearance  HENT:      Head: Normocephalic and atraumatic  Eyes:      Extraocular Movements: Extraocular movements intact  Cardiovascular:      Rate and Rhythm: Normal rate  Pulmonary:      Effort: Pulmonary effort is normal    Abdominal:      General: There is no distension  Palpations: Abdomen is soft  There is no mass  Tenderness: There is no abdominal tenderness  There is no right CVA tenderness, left CVA tenderness, guarding or rebound  Hernia: No hernia is present  Genitourinary:     Penis: Normal        Testes: Normal       Prostate: Normal       Rectum: Normal       Comments: Britt catheter in place, normal circumcised phallus  Testicles are descended bilaterally within normal limits  No hernia  Rectal exam reveals normal tone, no masses prostate about 30 g, smooth symmetric benign  No nodules  Musculoskeletal:         General: Normal range of motion  Cervical back: Normal range of motion  Skin:     Coloration: Skin is not jaundiced or pale  Neurological:      General: No focal deficit present  Mental Status: He is alert and oriented to person, place, and time  Comments: Very hard of hearing   Psychiatric:         Mood and Affect: Mood normal          Behavior: Behavior normal          Thought Content:  Thought content normal          Judgment: Judgment normal                   Current Medications      Current Outpatient Medications:     finasteride (PROSCAR) 5 mg tablet, TAKE ONE TABLET BY MOUTH DAILY FOR PROSTATE (BPH), Disp: , Rfl:     FLUoxetine (PROzac) 10 mg capsule, TAKE 1 CAPSULE BY MOUTH EVERY MORNING FOR MOOD, Disp: , Rfl:     tamsulosin (FLOMAX) 0 4 mg, Take 1 capsule (0 4 mg total) by mouth daily with dinner for 15 days, Disp: 15 capsule, Rfl: 0    Zoster Vac Recomb Adjuvanted 50 MCG/0 5ML SUSR, INJECT 1 VIAL (0 5ML) INTRAMUSCULARLY ONCE **1ST DOSE**  GIVE FIRST DOSE NOW AND GIVE SECOND DOSE 2 TO 6 MONTHS AFTER INITIAL DOSE **1ST DOSE**  GIVE FIRST DOSE NOW AND GIVE SECOND DOSE 2 TO 6 MONTHS AFTER INITIAL DOSE, Disp: , Rfl:     cefdinir (OMNICEF) 300 mg capsule, TAKE 1 CAPSULE BY MOUTH DAILY, Disp: , Rfl:            Onur Adams MD                  Electronically signed by Onur Adams MD at 6/14/2021  3:45 PM

## 2021-07-22 ENCOUNTER — ANESTHESIA (OUTPATIENT)
Dept: PERIOP | Facility: HOSPITAL | Age: 81
End: 2021-07-22
Payer: COMMERCIAL

## 2021-07-22 ENCOUNTER — HOSPITAL ENCOUNTER (OUTPATIENT)
Facility: HOSPITAL | Age: 81
Setting detail: OUTPATIENT SURGERY
Discharge: HOME | End: 2021-07-23
Attending: UROLOGY | Admitting: UROLOGY
Payer: COMMERCIAL

## 2021-07-22 DIAGNOSIS — N13.30 BILATERAL HYDRONEPHROSIS: ICD-10-CM

## 2021-07-22 DIAGNOSIS — R33.9 URINARY RETENTION: ICD-10-CM

## 2021-07-22 DIAGNOSIS — N40.1 BPH WITH OBSTRUCTION/LOWER URINARY TRACT SYMPTOMS: ICD-10-CM

## 2021-07-22 DIAGNOSIS — N13.8 BPH WITH OBSTRUCTION/LOWER URINARY TRACT SYMPTOMS: ICD-10-CM

## 2021-07-22 LAB
ABO GROUP BLD: NORMAL
ABO GROUP BLD: NORMAL
BLD GP AB SCN SERPL QL: NEGATIVE
RH BLD: POSITIVE
RH BLD: POSITIVE
SPECIMEN EXPIRATION DATE: NORMAL

## 2021-07-22 PROCEDURE — 88344 IMHCHEM/IMCYTCHM EA MLT ANTB: CPT | Performed by: PATHOLOGY

## 2021-07-22 PROCEDURE — 88342 IMHCHEM/IMCYTCHM 1ST ANTB: CPT | Performed by: PATHOLOGY

## 2021-07-22 PROCEDURE — 86901 BLOOD TYPING SEROLOGIC RH(D): CPT | Performed by: UROLOGY

## 2021-07-22 PROCEDURE — 51102 DRAIN BL W/CATH INSERTION: CPT | Performed by: UROLOGY

## 2021-07-22 PROCEDURE — 88305 TISSUE EXAM BY PATHOLOGIST: CPT | Performed by: PATHOLOGY

## 2021-07-22 PROCEDURE — NC001 PR NO CHARGE: Performed by: UROLOGY

## 2021-07-22 PROCEDURE — 86900 BLOOD TYPING SEROLOGIC ABO: CPT | Performed by: UROLOGY

## 2021-07-22 PROCEDURE — C2627 CATH, SUPRAPUBIC/CYSTOSCOPIC: HCPCS | Performed by: UROLOGY

## 2021-07-22 PROCEDURE — 86850 RBC ANTIBODY SCREEN: CPT | Performed by: UROLOGY

## 2021-07-22 PROCEDURE — 52630 REMOVE PROSTATE REGROWTH: CPT | Performed by: UROLOGY

## 2021-07-22 RX ORDER — PROPOFOL 10 MG/ML
INJECTION, EMULSION INTRAVENOUS AS NEEDED
Status: DISCONTINUED | OUTPATIENT
Start: 2021-07-22 | End: 2021-07-22

## 2021-07-22 RX ORDER — MAGNESIUM HYDROXIDE 1200 MG/15ML
3000 LIQUID ORAL CONTINUOUS
Status: DISCONTINUED | OUTPATIENT
Start: 2021-07-22 | End: 2021-07-23 | Stop reason: HOSPADM

## 2021-07-22 RX ORDER — LIDOCAINE HYDROCHLORIDE 10 MG/ML
INJECTION, SOLUTION EPIDURAL; INFILTRATION; INTRACAUDAL; PERINEURAL AS NEEDED
Status: DISCONTINUED | OUTPATIENT
Start: 2021-07-22 | End: 2021-07-22

## 2021-07-22 RX ORDER — SORBITOL 30 G/1000ML
IRRIGANT IRRIGATION AS NEEDED
Status: DISCONTINUED | OUTPATIENT
Start: 2021-07-22 | End: 2021-07-22 | Stop reason: HOSPADM

## 2021-07-22 RX ORDER — LEVOFLOXACIN 5 MG/ML
750 INJECTION, SOLUTION INTRAVENOUS EVERY 24 HOURS
Status: DISCONTINUED | OUTPATIENT
Start: 2021-07-23 | End: 2021-07-23 | Stop reason: HOSPADM

## 2021-07-22 RX ORDER — SODIUM CHLORIDE 450 MG/100ML
50 INJECTION, SOLUTION INTRAVENOUS CONTINUOUS
Status: DISCONTINUED | OUTPATIENT
Start: 2021-07-22 | End: 2021-07-23 | Stop reason: HOSPADM

## 2021-07-22 RX ORDER — FINASTERIDE 5 MG/1
5 TABLET, FILM COATED ORAL DAILY
Status: DISCONTINUED | OUTPATIENT
Start: 2021-07-22 | End: 2021-07-23 | Stop reason: HOSPADM

## 2021-07-22 RX ORDER — ONDANSETRON 2 MG/ML
4 INJECTION INTRAMUSCULAR; INTRAVENOUS ONCE AS NEEDED
Status: DISCONTINUED | OUTPATIENT
Start: 2021-07-22 | End: 2021-07-22 | Stop reason: HOSPADM

## 2021-07-22 RX ORDER — FENTANYL CITRATE/PF 50 MCG/ML
25 SYRINGE (ML) INJECTION
Status: DISCONTINUED | OUTPATIENT
Start: 2021-07-22 | End: 2021-07-22 | Stop reason: HOSPADM

## 2021-07-22 RX ORDER — CALCIUM CARBONATE 200(500)MG
1000 TABLET,CHEWABLE ORAL DAILY PRN
Status: DISCONTINUED | OUTPATIENT
Start: 2021-07-22 | End: 2021-07-23 | Stop reason: HOSPADM

## 2021-07-22 RX ORDER — LEVOFLOXACIN 500 MG/1
500 TABLET, FILM COATED ORAL EVERY 24 HOURS
Qty: 2 TABLET | Refills: 0 | Status: SHIPPED | OUTPATIENT
Start: 2021-07-22 | End: 2021-07-24

## 2021-07-22 RX ORDER — MAGNESIUM HYDROXIDE/ALUMINUM HYDROXICE/SIMETHICONE 120; 1200; 1200 MG/30ML; MG/30ML; MG/30ML
30 SUSPENSION ORAL EVERY 6 HOURS PRN
Status: DISCONTINUED | OUTPATIENT
Start: 2021-07-22 | End: 2021-07-23 | Stop reason: HOSPADM

## 2021-07-22 RX ORDER — GLYCOPYRROLATE 0.2 MG/ML
INJECTION INTRAMUSCULAR; INTRAVENOUS AS NEEDED
Status: DISCONTINUED | OUTPATIENT
Start: 2021-07-22 | End: 2021-07-22

## 2021-07-22 RX ORDER — PHENAZOPYRIDINE HYDROCHLORIDE 200 MG/1
200 TABLET, FILM COATED ORAL 3 TIMES DAILY PRN
Qty: 30 TABLET | Refills: 0 | Status: SHIPPED | OUTPATIENT
Start: 2021-07-22

## 2021-07-22 RX ORDER — SODIUM CHLORIDE, SODIUM LACTATE, POTASSIUM CHLORIDE, CALCIUM CHLORIDE 600; 310; 30; 20 MG/100ML; MG/100ML; MG/100ML; MG/100ML
50 INJECTION, SOLUTION INTRAVENOUS CONTINUOUS
Status: DISCONTINUED | OUTPATIENT
Start: 2021-07-22 | End: 2021-07-23 | Stop reason: HOSPADM

## 2021-07-22 RX ORDER — FLUOXETINE 10 MG/1
10 CAPSULE ORAL DAILY
Status: DISCONTINUED | OUTPATIENT
Start: 2021-07-22 | End: 2021-07-23 | Stop reason: HOSPADM

## 2021-07-22 RX ORDER — DEXAMETHASONE SODIUM PHOSPHATE 10 MG/ML
INJECTION, SOLUTION INTRAMUSCULAR; INTRAVENOUS AS NEEDED
Status: DISCONTINUED | OUTPATIENT
Start: 2021-07-22 | End: 2021-07-22

## 2021-07-22 RX ORDER — HYDROCODONE BITARTRATE AND ACETAMINOPHEN 5; 325 MG/1; MG/1
1-2 TABLET ORAL EVERY 6 HOURS PRN
Qty: 5 TABLET | Refills: 0 | Status: SHIPPED | OUTPATIENT
Start: 2021-07-22

## 2021-07-22 RX ORDER — SENNOSIDES 8.6 MG
1 TABLET ORAL DAILY
Status: DISCONTINUED | OUTPATIENT
Start: 2021-07-22 | End: 2021-07-23 | Stop reason: HOSPADM

## 2021-07-22 RX ORDER — LEVOFLOXACIN 5 MG/ML
750 INJECTION, SOLUTION INTRAVENOUS EVERY 24 HOURS
Status: DISCONTINUED | OUTPATIENT
Start: 2021-07-22 | End: 2021-07-22 | Stop reason: HOSPADM

## 2021-07-22 RX ORDER — OXYBUTYNIN CHLORIDE 5 MG/1
5 TABLET ORAL 3 TIMES DAILY PRN
Status: DISCONTINUED | OUTPATIENT
Start: 2021-07-22 | End: 2021-07-23 | Stop reason: HOSPADM

## 2021-07-22 RX ORDER — DOCUSATE SODIUM 100 MG/1
100 CAPSULE, LIQUID FILLED ORAL 2 TIMES DAILY
Status: DISCONTINUED | OUTPATIENT
Start: 2021-07-22 | End: 2021-07-23 | Stop reason: HOSPADM

## 2021-07-22 RX ORDER — PHENAZOPYRIDINE HYDROCHLORIDE 100 MG/1
100 TABLET, FILM COATED ORAL 3 TIMES DAILY PRN
Status: DISCONTINUED | OUTPATIENT
Start: 2021-07-22 | End: 2021-07-23 | Stop reason: HOSPADM

## 2021-07-22 RX ORDER — MAGNESIUM HYDROXIDE 1200 MG/15ML
LIQUID ORAL AS NEEDED
Status: DISCONTINUED | OUTPATIENT
Start: 2021-07-22 | End: 2021-07-22 | Stop reason: HOSPADM

## 2021-07-22 RX ORDER — LIDOCAINE HYDROCHLORIDE 10 MG/ML
0.5 INJECTION, SOLUTION EPIDURAL; INFILTRATION; INTRACAUDAL; PERINEURAL ONCE AS NEEDED
Status: DISCONTINUED | OUTPATIENT
Start: 2021-07-22 | End: 2021-07-22 | Stop reason: HOSPADM

## 2021-07-22 RX ORDER — HYDROCODONE BITARTRATE AND ACETAMINOPHEN 5; 325 MG/1; MG/1
1 TABLET ORAL EVERY 6 HOURS PRN
Status: DISCONTINUED | OUTPATIENT
Start: 2021-07-22 | End: 2021-07-23 | Stop reason: HOSPADM

## 2021-07-22 RX ORDER — EPHEDRINE SULFATE 50 MG/ML
INJECTION INTRAVENOUS AS NEEDED
Status: DISCONTINUED | OUTPATIENT
Start: 2021-07-22 | End: 2021-07-22

## 2021-07-22 RX ORDER — ONDANSETRON 2 MG/ML
4 INJECTION INTRAMUSCULAR; INTRAVENOUS EVERY 6 HOURS PRN
Status: DISCONTINUED | OUTPATIENT
Start: 2021-07-22 | End: 2021-07-23 | Stop reason: HOSPADM

## 2021-07-22 RX ORDER — FENTANYL CITRATE 50 UG/ML
INJECTION, SOLUTION INTRAMUSCULAR; INTRAVENOUS AS NEEDED
Status: DISCONTINUED | OUTPATIENT
Start: 2021-07-22 | End: 2021-07-22

## 2021-07-22 RX ORDER — TAMSULOSIN HYDROCHLORIDE 0.4 MG/1
0.4 CAPSULE ORAL 2 TIMES DAILY
Status: DISCONTINUED | OUTPATIENT
Start: 2021-07-22 | End: 2021-07-23 | Stop reason: HOSPADM

## 2021-07-22 RX ORDER — MORPHINE SULFATE 4 MG/ML
4 INJECTION, SOLUTION INTRAMUSCULAR; INTRAVENOUS EVERY 2 HOUR PRN
Status: DISCONTINUED | OUTPATIENT
Start: 2021-07-22 | End: 2021-07-23 | Stop reason: HOSPADM

## 2021-07-22 RX ORDER — ESMOLOL HYDROCHLORIDE 10 MG/ML
INJECTION INTRAVENOUS AS NEEDED
Status: DISCONTINUED | OUTPATIENT
Start: 2021-07-22 | End: 2021-07-22

## 2021-07-22 RX ORDER — ONDANSETRON 2 MG/ML
INJECTION INTRAMUSCULAR; INTRAVENOUS AS NEEDED
Status: DISCONTINUED | OUTPATIENT
Start: 2021-07-22 | End: 2021-07-22

## 2021-07-22 RX ADMIN — LIDOCAINE HYDROCHLORIDE 50 MG: 10 INJECTION, SOLUTION EPIDURAL; INFILTRATION; INTRACAUDAL; PERINEURAL at 12:25

## 2021-07-22 RX ADMIN — FENTANYL CITRATE 50 MCG: 50 INJECTION, SOLUTION INTRAMUSCULAR; INTRAVENOUS at 12:51

## 2021-07-22 RX ADMIN — PROPOFOL 40 MG: 10 INJECTION, EMULSION INTRAVENOUS at 12:49

## 2021-07-22 RX ADMIN — SENNOSIDES 8.6 MG: 8.6 TABLET, FILM COATED ORAL at 16:01

## 2021-07-22 RX ADMIN — LEVOFLOXACIN: 750 INJECTION, SOLUTION INTRAVENOUS at 12:22

## 2021-07-22 RX ADMIN — SODIUM CHLORIDE FOR IRRIGATION 3000 ML: 0.9 SOLUTION IRRIGATION at 20:01

## 2021-07-22 RX ADMIN — GLYCOPYRROLATE 0.2 MG: 0.2 INJECTION, SOLUTION INTRAMUSCULAR; INTRAVENOUS at 12:41

## 2021-07-22 RX ADMIN — SODIUM CHLORIDE, SODIUM LACTATE, POTASSIUM CHLORIDE, AND CALCIUM CHLORIDE 50 ML/HR: .6; .31; .03; .02 INJECTION, SOLUTION INTRAVENOUS at 14:25

## 2021-07-22 RX ADMIN — EPHEDRINE SULFATE 10 MG: 50 INJECTION, SOLUTION INTRAVENOUS at 12:37

## 2021-07-22 RX ADMIN — PROPOFOL 160 MG: 10 INJECTION, EMULSION INTRAVENOUS at 12:25

## 2021-07-22 RX ADMIN — FLUOXETINE 10 MG: 10 CAPSULE ORAL at 16:01

## 2021-07-22 RX ADMIN — DEXAMETHASONE SODIUM PHOSPHATE 4 MG: 10 INJECTION, SOLUTION INTRAMUSCULAR; INTRAVENOUS at 12:25

## 2021-07-22 RX ADMIN — SODIUM CHLORIDE 50 ML/HR: 0.45 INJECTION, SOLUTION INTRAVENOUS at 14:42

## 2021-07-22 RX ADMIN — TAMSULOSIN HYDROCHLORIDE 0.4 MG: 0.4 CAPSULE ORAL at 17:10

## 2021-07-22 RX ADMIN — FINASTERIDE 5 MG: 5 TABLET, FILM COATED ORAL at 16:01

## 2021-07-22 RX ADMIN — FENTANYL CITRATE 25 MCG: 50 INJECTION, SOLUTION INTRAMUSCULAR; INTRAVENOUS at 12:42

## 2021-07-22 RX ADMIN — ESMOLOL HYDROCHLORIDE 30 MG: 10 INJECTION, SOLUTION INTRAVENOUS at 13:15

## 2021-07-22 RX ADMIN — EPHEDRINE SULFATE 10 MG: 50 INJECTION, SOLUTION INTRAVENOUS at 12:32

## 2021-07-22 RX ADMIN — DOCUSATE SODIUM 100 MG: 100 CAPSULE ORAL at 17:10

## 2021-07-22 RX ADMIN — ESMOLOL HYDROCHLORIDE 30 MG: 10 INJECTION, SOLUTION INTRAVENOUS at 13:05

## 2021-07-22 RX ADMIN — SODIUM CHLORIDE 50 ML/HR: 0.45 INJECTION, SOLUTION INTRAVENOUS at 16:01

## 2021-07-22 RX ADMIN — FENTANYL CITRATE 50 MCG: 50 INJECTION, SOLUTION INTRAMUSCULAR; INTRAVENOUS at 12:25

## 2021-07-22 RX ADMIN — SODIUM CHLORIDE FOR IRRIGATION 3000 ML: 0.9 SOLUTION IRRIGATION at 13:53

## 2021-07-22 RX ADMIN — ESMOLOL HYDROCHLORIDE 40 MG: 10 INJECTION, SOLUTION INTRAVENOUS at 13:42

## 2021-07-22 RX ADMIN — ONDANSETRON HYDROCHLORIDE 4 MG: 2 INJECTION, SOLUTION INTRAVENOUS at 12:25

## 2021-07-22 RX ADMIN — FENTANYL CITRATE 25 MCG: 50 INJECTION, SOLUTION INTRAMUSCULAR; INTRAVENOUS at 12:36

## 2021-07-22 RX ADMIN — SODIUM CHLORIDE, SODIUM LACTATE, POTASSIUM CHLORIDE, AND CALCIUM CHLORIDE 50 ML/HR: .6; .31; .03; .02 INJECTION, SOLUTION INTRAVENOUS at 10:49

## 2021-07-22 NOTE — DISCHARGE INSTRUCTIONS
We will remove the urethral catheter in the office on Monday  Our office will call you with an appointment  When the urethral catheter is removed, you will tried to urinate through the urethra  If unable, unplug the suprapubic tube and empty the urine into the toilet after measuring the amount that is in there  If you do urinate, open the plug up and measure how much is left over after urinate through your penis  When you are consistently urinating through the penis and you have less than 100 cc or 3 oz left over coming out of the suprapubic tube, it will be time to remove the suprapubic tube in the hole will close up on its own  Take over-the-counter remedies to avoid constipation  No driving or operating machinery if taking narcotic medications  Call for fever greater than 101 5°, severe pain not relieved by pain medications or problems with the catheter  You may walk shower and take stairs  No heavy lifting for 2 weeks  You may resume your normal medicines regular diet

## 2021-07-22 NOTE — QUICK NOTE
Patient underwent successful transurethral resection of prostate and suprapubic tube placement  Plan discharge tomorrow with Britt catheter draining to a leg bag and the suprapubic tube plugged  Office has been contacted to contact patient to come to the office on Monday for removal of the Britt catheter  All prescriptions needed for home have been sent in  Discharge structures are in epic  Plan to discharge remotely if no issues overnight

## 2021-07-22 NOTE — ANESTHESIA PREPROCEDURE EVALUATION
Procedure:  TRANSURETHRAL RESECTION OF PROSTATE (TURP), cystoscopy (N/A Bladder)  INSERTION SUPRAPUBIC CATHETER PERCUTANEOUS (N/A Bladder)    Relevant Problems   CARDIO   (+) Essential hypertension      /RENAL   (+) Benign prostatic hyperplasia with incomplete bladder emptying   (+) Stage 3b chronic kidney disease (HCC)        Physical Exam    Airway    Mallampati score: II  TM Distance: >3 FB  Neck ROM: full     Dental       Cardiovascular      Pulmonary      Other Findings        Anesthesia Plan  ASA Score- 3     Anesthesia Type- general with ASA Monitors  Additional Monitors:   Airway Plan: LMA  Plan Factors-Exercise tolerance (METS): >4 METS  Chart reviewed  EKG reviewed  Existing labs reviewed  Patient summary reviewed  Induction- intravenous  Postoperative Plan- Plan for postoperative opioid use  Planned trial extubation    Informed Consent- Anesthetic plan and risks discussed with patient

## 2021-07-22 NOTE — PROGRESS NOTES
Pt was found to have chew in mouth, [facility] doctor made aware  Dr asked th just remove chew

## 2021-07-22 NOTE — ANESTHESIA POSTPROCEDURE EVALUATION
Post-Op Assessment Note    CV Status:  Stable  Pain Score: 0    Pain management: adequate     Mental Status:  Sleepy   Hydration Status:  Euvolemic   PONV Controlled:  Controlled   Airway Patency:  Patent   Two or more mitigation strategies used for obstructive sleep apnea   Post Op Vitals Reviewed: Yes      Staff: CRNA         No complications documented      BP   163/81   Temp 97 5   Pulse 71   Resp 15   SpO2 100

## 2021-07-22 NOTE — PLAN OF CARE
Problem: MOBILITY - ADULT  Goal: Maintain or return to baseline ADL function  Description: INTERVENTIONS:  -  Assess patient's ability to carry out ADLs; assess patient's baseline for ADL function and identify physical deficits which impact ability to perform ADLs (bathing, care of mouth/teeth, toileting, grooming, dressing, etc )  - Assess/evaluate cause of self-care deficits   - Assess range of motion  - Assess patient's mobility; develop plan if impaired  - Assess patient's need for assistive devices and provide as appropriate  - Encourage maximum independence but intervene and supervise when necessary  - Involve family in performance of ADLs  - Assess for home care needs following discharge   - Consider OT consult to assist with ADL evaluation and planning for discharge  - Provide patient education as appropriate  Outcome: Progressing  Goal: Maintains/Returns to pre admission functional level  Description: INTERVENTIONS:  - Perform BMAT or MOVE assessment daily    - Set and communicate daily mobility goal to care team and patient/family/caregiver  - Collaborate with rehabilitation services on mobility goals if consulted  - Perform Range of Motion  times a day  - Reposition patient every hours    - Dangle patient  times a day  - Stand patient  times a day  - Ambulate patient  times a day  - Out of bed to chair  times a day   - Out of bed for meals times a day  - Out of bed for toileting  - Record patient progress and toleration of activity level   Outcome: Progressing     Problem: INFECTION - ADULT  Goal: Absence or prevention of progression during hospitalization  Description: INTERVENTIONS:  - Assess and monitor for signs and symptoms of infection  - Monitor lab/diagnostic results  - Monitor all insertion sites, i e  indwelling lines, tubes, and drains  - Monitor endotracheal if appropriate and nasal secretions for changes in amount and color  - Adams appropriate cooling/warming therapies per order  - Administer medications as ordered  - Instruct and encourage patient and family to use good hand hygiene technique  - Identify and instruct in appropriate isolation precautions for identified infection/condition  Outcome: Progressing  Goal: Absence of fever/infection during neutropenic period  Description: INTERVENTIONS:  - Monitor WBC    Outcome: Progressing     Problem: SAFETY ADULT  Goal: Maintain or return to baseline ADL function  Description: INTERVENTIONS:  -  Assess patient's ability to carry out ADLs; assess patient's baseline for ADL function and identify physical deficits which impact ability to perform ADLs (bathing, care of mouth/teeth, toileting, grooming, dressing, etc )  - Assess/evaluate cause of self-care deficits   - Assess range of motion  - Assess patient's mobility; develop plan if impaired  - Assess patient's need for assistive devices and provide as appropriate  - Encourage maximum independence but intervene and supervise when necessary  - Involve family in performance of ADLs  - Assess for home care needs following discharge   - Consider OT consult to assist with ADL evaluation and planning for discharge  - Provide patient education as appropriate  Outcome: Progressing  Goal: Maintains/Returns to pre admission functional level  Description: INTERVENTIONS:  - Perform BMAT or MOVE assessment daily    - Set and communicate daily mobility goal to care team and patient/family/caregiver  - Collaborate with rehabilitation services on mobility goals if consulted  - Perform Range of Motion times a day  - Reposition patient every hours    - Dangle patient  times a day  - Stand patient times a day  - Ambulate patient  times a day  - Out of bed to chair times a day   - Out of bed for meals  times a day  - Out of bed for toileting  - Record patient progress and toleration of activity level   Outcome: Progressing  Goal: Patient will remain free of falls  Description: INTERVENTIONS:  - Educate patient/family on patient safety including physical limitations  - Instruct patient to call for assistance with activity   - Consult OT/PT to assist with strengthening/mobility   - Keep Call bell within reach  - Keep bed low and locked with side rails adjusted as appropriate  - Keep care items and personal belongings within reach  - Initiate and maintain comfort rounds  - Make Fall Risk Sign visible to staff  - Offer Toileting every Hours, in advance of need  - Initiate/Maintain alarm  - Obtain necessary fall risk management equipment:   - Apply yellow socks and bracelet for high fall risk patients  - Consider moving patient to room near nurses station  Outcome: Progressing     Problem: DISCHARGE PLANNING  Goal: Discharge to home or other facility with appropriate resources  Description: INTERVENTIONS:  - Identify barriers to discharge w/patient and caregiver  - Arrange for needed discharge resources and transportation as appropriate  - Identify discharge learning needs (meds, wound care, etc )  - Arrange for interpretive services to assist at discharge as needed  - Refer to Case Management Department for coordinating discharge planning if the patient needs post-hospital services based on physician/advanced practitioner order or complex needs related to functional status, cognitive ability, or social support system  Outcome: Progressing     Problem: Knowledge Deficit  Goal: Patient/family/caregiver demonstrates understanding of disease process, treatment plan, medications, and discharge instructions  Description: Complete learning assessment and assess knowledge base    Interventions:  - Provide teaching at level of understanding  - Provide teaching via preferred learning methods  Outcome: Progressing     Problem: NEUROSENSORY - ADULT  Goal: Achieves stable or improved neurological status  Description: INTERVENTIONS  - Monitor and report changes in neurological status  - Monitor vital signs such as temperature, blood pressure, glucose, and any other labs ordered   - Initiate measures to prevent increased intracranial pressure  - Monitor for seizure activity and implement precautions if appropriate      Outcome: Progressing  Goal: Remains free of injury related to seizures activity  Description: INTERVENTIONS  - Maintain airway, patient safety  and administer oxygen as ordered  - Monitor patient for seizure activity, document and report duration and description of seizure to physician/advanced practitioner  - If seizure occurs,  ensure patient safety during seizure  - Reorient patient post seizure  - Seizure pads on all 4 side rails  - Instruct patient/family to notify RN of any seizure activity including if an aura is experienced  - Instruct patient/family to call for assistance with activity based on nursing assessment  - Administer anti-seizure medications if ordered    Outcome: Progressing  Goal: Achieves maximal functionality and self care  Description: INTERVENTIONS  - Monitor swallowing and airway patency with patient fatigue and changes in neurological status  - Encourage and assist patient to increase activity and self care     - Encourage visually impaired, hearing impaired and aphasic patients to use assistive/communication devices  Outcome: Progressing     Problem: CARDIOVASCULAR - ADULT  Goal: Maintains optimal cardiac output and hemodynamic stability  Description: INTERVENTIONS:  - Monitor I/O, vital signs and rhythm  - Monitor for S/S and trends of decreased cardiac output  - Administer and titrate ordered vasoactive medications to optimize hemodynamic stability  - Assess quality of pulses, skin color and temperature  - Assess for signs of decreased coronary artery perfusion  - Instruct patient to report change in severity of symptoms  Outcome: Progressing  Goal: Absence of cardiac dysrhythmias or at baseline rhythm  Description: INTERVENTIONS:  - Continuous cardiac monitoring, vital signs, obtain 12 lead EKG if ordered  - Administer antiarrhythmic and heart rate control medications as ordered  - Monitor electrolytes and administer replacement therapy as ordered  Outcome: Progressing     Problem: RESPIRATORY - ADULT  Goal: Achieves optimal ventilation and oxygenation  Description: INTERVENTIONS:  - Assess for changes in respiratory status  - Assess for changes in mentation and behavior  - Position to facilitate oxygenation and minimize respiratory effort  - Oxygen administered by appropriate delivery if ordered  - Initiate smoking cessation education as indicated  - Encourage broncho-pulmonary hygiene including cough, deep breathe, Incentive Spirometry  - Assess the need for suctioning and aspirate as needed  - Assess and instruct to report SOB or any respiratory difficulty  - Respiratory Therapy support as indicated  Outcome: Progressing     Problem: GASTROINTESTINAL - ADULT  Goal: Minimal or absence of nausea and/or vomiting  Description: INTERVENTIONS:  - Administer IV fluids if ordered to ensure adequate hydration  - Maintain NPO status until nausea and vomiting are resolved  - Nasogastric tube if ordered  - Administer ordered antiemetic medications as needed  - Provide nonpharmacologic comfort measures as appropriate  - Advance diet as tolerated, if ordered  - Consider nutrition services referral to assist patient with adequate nutrition and appropriate food choices  Outcome: Progressing  Goal: Maintains or returns to baseline bowel function  Description: INTERVENTIONS:  - Assess bowel function  - Encourage oral fluids to ensure adequate hydration  - Administer IV fluids if ordered to ensure adequate hydration  - Administer ordered medications as needed  - Encourage mobilization and activity  - Consider nutritional services referral to assist patient with adequate nutrition and appropriate food choices  Outcome: Progressing  Goal: Maintains adequate nutritional intake  Description: INTERVENTIONS:  - Monitor percentage of each meal consumed  - Identify factors contributing to decreased intake, treat as appropriate  - Assist with meals as needed  - Monitor I&O, weight, and lab values if indicated  - Obtain nutrition services referral as needed  Outcome: Progressing  Goal: Oral mucous membranes remain intact  Description: INTERVENTIONS  - Assess oral mucosa and hygiene practices  - Implement preventative oral hygiene regimen  - Implement oral medicated treatments as ordered  - Initiate Nutrition services referral as needed  Outcome: Progressing     Problem: GENITOURINARY - ADULT  Goal: Maintains or returns to baseline urinary function  Description: INTERVENTIONS:  - Assess urinary function  - Encourage oral fluids to ensure adequate hydration if ordered  - Administer IV fluids as ordered to ensure adequate hydration  - Administer ordered medications as needed  - Offer frequent toileting  - Follow urinary retention protocol if ordered  Outcome: Progressing  Goal: Absence of urinary retention  Description: INTERVENTIONS:  - Assess patients ability to void and empty bladder  - Monitor I/O  - Bladder scan as needed  - Discuss with physician/AP medications to alleviate retention as needed  - Discuss catheterization for long term situations as appropriate  Outcome: Progressing  Goal: Urinary catheter remains patent  Description: INTERVENTIONS:  - Assess patency of urinary catheter  - If patient has a chronic oliveira, consider changing catheter if non-functioning  - Follow guidelines for intermittent irrigation of non-functioning urinary catheter  Outcome: Progressing     Problem: METABOLIC, FLUID AND ELECTROLYTES - ADULT  Goal: Electrolytes maintained within normal limits  Description: INTERVENTIONS:  - Monitor labs and assess patient for signs and symptoms of electrolyte imbalances  - Administer electrolyte replacement as ordered  - Monitor response to electrolyte replacements, including repeat lab results as appropriate  - Instruct patient on fluid and nutrition as appropriate  Outcome: Progressing  Goal: Fluid balance maintained  Description: INTERVENTIONS:  - Monitor labs   - Monitor I/O and WT  - Instruct patient on fluid and nutrition as appropriate  - Assess for signs & symptoms of volume excess or deficit  Outcome: Progressing     Problem: SKIN/TISSUE INTEGRITY - ADULT  Goal: Skin Integrity remains intact(Skin Breakdown Prevention)  Description: Assess:  -Perform Ángel assessment every  -Clean and moisturize skin every   -Inspect skin when repositioning, toileting, and assisting with ADLS  -Assess under medical devices such as  every   -Assess extremities for adequate circulation and sensation     Bed Management:  -Have minimal linens on bed & keep smooth, unwrinkled  -Change linens as needed when moist or perspiring  -Avoid sitting or lying in one position for more than hours while in bed  -Keep HOB at degrees     Toileting:  -Offer bedside commode  -Assess for incontinence every   -Use incontinent care products after each incontinent episode such as     Activity:  -Mobilize patient times a day  -Encourage activity and walks on unit  -Encourage or provide ROM exercises   -Turn and reposition patient every Hours  -Use appropriate equipment to lift or move patient in bed  -Instruct/ Assist with weight shifting every when out of bed in chair  -Consider limitation of chair time  hour intervals    Skin Care:  -Avoid use of baby powder, tape, friction and shearing, hot water or constrictive clothing  -Relieve pressure over bony prominences using   -Do not massage red bony areas    Next Steps:  -Teach patient strategies to minimize risks such as   -Consider consults to  interdisciplinary teams such as   Outcome: Progressing     Problem: HEMATOLOGIC - ADULT  Goal: Maintains hematologic stability  Description: INTERVENTIONS  - Assess for signs and symptoms of bleeding or hemorrhage  - Monitor labs  - Administer supportive blood products/factors as ordered and appropriate  Outcome: Progressing     Problem: MUSCULOSKELETAL - ADULT  Goal: Maintain or return mobility to safest level of function  Description: INTERVENTIONS:  - Assess patient's ability to carry out ADLs; assess patient's baseline for ADL function and identify physical deficits which impact ability to perform ADLs (bathing, care of mouth/teeth, toileting, grooming, dressing, etc )  - Assess/evaluate cause of self-care deficits   - Assess range of motion  - Assess patient's mobility  - Assess patient's need for assistive devices and provide as appropriate  - Encourage maximum independence but intervene and supervise when necessary  - Involve family in performance of ADLs  - Assess for home care needs following discharge   - Consider OT consult to assist with ADL evaluation and planning for discharge  - Provide patient education as appropriate  Outcome: Progressing  Goal: Maintain proper alignment of affected body part  Description: INTERVENTIONS:  - Support, maintain and protect limb and body alignment  - Provide patient/ family with appropriate education  Outcome: Progressing

## 2021-07-23 ENCOUNTER — TELEPHONE (OUTPATIENT)
Dept: UROLOGY | Facility: CLINIC | Age: 81
End: 2021-07-23

## 2021-07-23 VITALS
DIASTOLIC BLOOD PRESSURE: 68 MMHG | HEART RATE: 67 BPM | HEIGHT: 65 IN | OXYGEN SATURATION: 97 % | SYSTOLIC BLOOD PRESSURE: 113 MMHG | TEMPERATURE: 97.3 F | RESPIRATION RATE: 16 BRPM | BODY MASS INDEX: 26.89 KG/M2 | WEIGHT: 161.38 LBS

## 2021-07-23 RX ADMIN — SODIUM CHLORIDE FOR IRRIGATION 3000 ML: 0.9 SOLUTION IRRIGATION at 02:44

## 2021-07-23 RX ADMIN — TAMSULOSIN HYDROCHLORIDE 0.4 MG: 0.4 CAPSULE ORAL at 08:04

## 2021-07-23 RX ADMIN — DOCUSATE SODIUM 100 MG: 100 CAPSULE ORAL at 08:04

## 2021-07-23 RX ADMIN — SENNOSIDES 8.6 MG: 8.6 TABLET, FILM COATED ORAL at 08:04

## 2021-07-23 RX ADMIN — FLUOXETINE 10 MG: 10 CAPSULE ORAL at 08:04

## 2021-07-23 RX ADMIN — LEVOFLOXACIN 750 MG: 5 INJECTION, SOLUTION INTRAVENOUS at 09:56

## 2021-07-23 RX ADMIN — FINASTERIDE 5 MG: 5 TABLET, FILM COATED ORAL at 08:04

## 2021-07-23 RX ADMIN — SODIUM CHLORIDE 50 ML/HR: 0.45 INJECTION, SOLUTION INTRAVENOUS at 08:05

## 2021-07-23 NOTE — TELEPHONE ENCOUNTER
Contacted and spoke with patient's son, Sandee Quiroz, to schedule an appointment for his father on Monday the 26th at the [facility] office  Sandee Quiroz has an appointment with Ashwin Paige at 1000 on Monday  Will schedule his father for the same time

## 2021-07-23 NOTE — NURSING NOTE
Patient left floor in stable condition via wheelchair with son  Discharge instructions reviewed with patient with verbal understanding obtained

## 2021-07-23 NOTE — QUICK NOTE
Chart reviewed, afebrile vital signs stable  Doing well postop day 1  Discharge home to follow up Monday for Britt catheter removal in the office  All prescriptions have been called in  Suprapubic tube is capped

## 2021-07-23 NOTE — PLAN OF CARE
Problem: MOBILITY - ADULT  Goal: Maintain or return to baseline ADL function  Description: INTERVENTIONS:  -  Assess patient's ability to carry out ADLs; assess patient's baseline for ADL function and identify physical deficits which impact ability to perform ADLs (bathing, care of mouth/teeth, toileting, grooming, dressing, etc )  - Assess/evaluate cause of self-care deficits   - Assess range of motion  - Assess patient's mobility; develop plan if impaired  - Assess patient's need for assistive devices and provide as appropriate  - Encourage maximum independence but intervene and supervise when necessary  - Involve family in performance of ADLs  - Assess for home care needs following discharge   - Consider OT consult to assist with ADL evaluation and planning for discharge  - Provide patient education as appropriate  Outcome: Progressing  Goal: Maintains/Returns to pre admission functional level  Description: INTERVENTIONS:  - Perform BMAT or MOVE assessment daily    - Set and communicate daily mobility goal to care team and patient/family/caregiver  - Collaborate with rehabilitation services on mobility goals if consulted  - Perform Range of Motion 3 times a day  - Reposition patient every 2 hours    - Dangle patient 2 times a day (as able d/t CBI)  - Stand patient 2 times a day (as able d/t CBI)  - Ambulate patient 3 times a day (as able d/t CBI)  - Out of bed to chair 3 times a day (as able d/t CBI)  - Out of bed for meals 3 times a day (as able d/t CBI)  - Out of bed for toileting (as able d/t CBI)  - Record patient progress and toleration of activity level   Outcome: Progressing     Problem: PAIN - ADULT  Goal: Verbalizes/displays adequate comfort level or baseline comfort level  Description: Interventions:  - Encourage patient to monitor pain and request assistance  - Assess pain using appropriate pain scale (0-10 pain scale)  - Administer analgesics based on type and severity of pain and evaluate response  - Implement non-pharmacological measures as appropriate and evaluate response  - Consider cultural and social influences on pain and pain management  - Notify physician/advanced practitioner if interventions unsuccessful or patient reports new pain  Outcome: Progressing     Problem: INFECTION - ADULT  Goal: Absence or prevention of progression during hospitalization  Description: INTERVENTIONS:  - Assess and monitor for signs and symptoms of infection  - Monitor lab/diagnostic results  - Monitor all insertion sites, i e  indwelling lines, tubes, and drains  - Monitor endotracheal if appropriate and nasal secretions for changes in amount and color  - [place] appropriate cooling/warming therapies per order  - Administer medications as ordered  - Instruct and encourage patient and family to use good hand hygiene technique  - Identify and instruct in appropriate isolation precautions for identified infection/condition  Outcome: Progressing  Goal: Absence of fever/infection during neutropenic period  Description: INTERVENTIONS:  - Monitor WBC    Outcome: Progressing     Problem: SAFETY ADULT  Goal: Maintain or return to baseline ADL function  Description: INTERVENTIONS:  -  Assess patient's ability to carry out ADLs; assess patient's baseline for ADL function and identify physical deficits which impact ability to perform ADLs (bathing, care of mouth/teeth, toileting, grooming, dressing, etc )  - Assess/evaluate cause of self-care deficits   - Assess range of motion  - Assess patient's mobility; develop plan if impaired  - Assess patient's need for assistive devices and provide as appropriate  - Encourage maximum independence but intervene and supervise when necessary  - Involve family in performance of ADLs  - Assess for home care needs following discharge   - Consider OT consult to assist with ADL evaluation and planning for discharge  - Provide patient education as appropriate  Outcome: Progressing  Goal: Maintains/Returns to pre admission functional level  Description: INTERVENTIONS:  - Perform BMAT or MOVE assessment daily    - Set and communicate daily mobility goal to care team and patient/family/caregiver  - Collaborate with rehabilitation services on mobility goals if consulted  - Perform Range of Motion 3 times a day  - Reposition patient every 2 hours    - Dangle patient 2 times a day (as able d/t CBI)  - Stand patient 2 times a day (as able d/t CBI)  - Ambulate patient 3 times a day (as able d/t CBI)  - Out of bed to chair 3 times a day (as able d/t CBI)  - Out of bed for meals 3 times a day (as able d/t CBI)  - Out of bed for toileting (as able d/t CBI)  - Record patient progress and toleration of activity level   Outcome: Progressing  Goal: Patient will remain free of falls  Description: INTERVENTIONS:  - Educate patient/family on patient safety including physical limitations  - Instruct patient to call for assistance with activity   - Consult OT/PT to assist with strengthening/mobility   - Keep Call bell within reach  - Keep bed low and locked with side rails adjusted as appropriate  - Keep care items and personal belongings within reach  - Initiate and maintain comfort rounds  - Make Fall Risk Sign visible to staff  - Offer Toileting every 2 Hours, in advance of need  - Initiate/Maintain bed alarm  - Obtain necessary fall risk management equipment:   - Apply yellow socks and bracelet for high fall risk patients  - Consider moving patient to room near nurses station  Outcome: Progressing     Problem: DISCHARGE PLANNING  Goal: Discharge to home or other facility with appropriate resources  Description: INTERVENTIONS:  - Identify barriers to discharge w/patient and caregiver  - Arrange for needed discharge resources and transportation as appropriate  - Identify discharge learning needs (meds, wound care, etc )  - Arrange for interpretive services to assist at discharge as needed  - Refer to Case Management Department for coordinating discharge planning if the patient needs post-hospital services based on physician/advanced practitioner order or complex needs related to functional status, cognitive ability, or social support system  Outcome: Progressing     Problem: Knowledge Deficit  Goal: Patient/family/caregiver demonstrates understanding of disease process, treatment plan, medications, and discharge instructions  Description: Complete learning assessment and assess knowledge base    Interventions:  - Provide teaching at level of understanding  - Provide teaching via preferred learning methods  Outcome: Progressing     Problem: CARDIOVASCULAR - ADULT  Goal: Maintains optimal cardiac output and hemodynamic stability  Description: INTERVENTIONS:  - Monitor I/O, vital signs and rhythm  - Monitor for S/S and trends of decreased cardiac output  - Administer and titrate ordered vasoactive medications to optimize hemodynamic stability  - Assess quality of pulses, skin color and temperature  - Assess for signs of decreased coronary artery perfusion  - Instruct patient to report change in severity of symptoms  Outcome: Progressing  Goal: Absence of cardiac dysrhythmias or at baseline rhythm  Description: INTERVENTIONS:  - Continuous cardiac monitoring, vital signs, obtain 12 lead EKG if ordered  - Administer antiarrhythmic and heart rate control medications as ordered  - Monitor electrolytes and administer replacement therapy as ordered  Outcome: Progressing     Problem: RESPIRATORY - ADULT  Goal: Achieves optimal ventilation and oxygenation  Description: INTERVENTIONS:  - Assess for changes in respiratory status  - Assess for changes in mentation and behavior  - Position to facilitate oxygenation and minimize respiratory effort  - Oxygen administered by appropriate delivery if ordered  - Initiate smoking cessation education as indicated  - Encourage broncho-pulmonary hygiene including cough, deep breathe, Incentive Spirometry  - Assess the need for suctioning and aspirate as needed  - Assess and instruct to report SOB or any respiratory difficulty  - Respiratory Therapy support as indicated  Outcome: Progressing     Problem: GASTROINTESTINAL - ADULT  Goal: Minimal or absence of nausea and/or vomiting  Description: INTERVENTIONS:  - Administer IV fluids if ordered to ensure adequate hydration  - Maintain NPO status until nausea and vomiting are resolved  - Nasogastric tube if ordered  - Administer ordered antiemetic medications as needed  - Provide nonpharmacologic comfort measures as appropriate  - Advance diet as tolerated, if ordered  - Consider nutrition services referral to assist patient with adequate nutrition and appropriate food choices  Outcome: Progressing  Goal: Maintains or returns to baseline bowel function  Description: INTERVENTIONS:  - Assess bowel function  - Encourage oral fluids to ensure adequate hydration  - Administer IV fluids if ordered to ensure adequate hydration  - Administer ordered medications as needed  - Encourage mobilization and activity  - Consider nutritional services referral to assist patient with adequate nutrition and appropriate food choices  Outcome: Progressing  Goal: Maintains adequate nutritional intake  Description: INTERVENTIONS:  - Monitor percentage of each meal consumed  - Identify factors contributing to decreased intake, treat as appropriate  - Assist with meals as needed  - Monitor I&O, weight, and lab values if indicated  - Obtain nutrition services referral as needed  Outcome: Progressing  Goal: Oral mucous membranes remain intact  Description: INTERVENTIONS  - Assess oral mucosa and hygiene practices  - Implement preventative oral hygiene regimen  - Implement oral medicated treatments as ordered  - Initiate Nutrition services referral as needed  Outcome: Progressing     Problem: GENITOURINARY - ADULT  Goal: Maintains or returns to baseline urinary function  Description: INTERVENTIONS:  - Assess urinary function  - Encourage oral fluids to ensure adequate hydration if ordered  - Administer IV fluids as ordered to ensure adequate hydration  - Administer ordered medications as needed  - Offer frequent toileting  - Follow urinary retention protocol if ordered  Outcome: Progressing  Goal: Absence of urinary retention  Description: INTERVENTIONS:  - Assess patients ability to void and empty bladder  - Monitor I/O  - Bladder scan as needed  - Discuss with physician/AP medications to alleviate retention as needed  - Discuss catheterization for long term situations as appropriate  Outcome: Progressing  Goal: Urinary catheter remains patent  Description: INTERVENTIONS:  - Assess patency of urinary catheter  - If patient has a chronic oliveira, consider changing catheter if non-functioning  - Follow guidelines for intermittent irrigation of non-functioning urinary catheter  Outcome: Progressing     Problem: METABOLIC, FLUID AND ELECTROLYTES - ADULT  Goal: Electrolytes maintained within normal limits  Description: INTERVENTIONS:  - Monitor labs and assess patient for signs and symptoms of electrolyte imbalances  - Administer electrolyte replacement as ordered  - Monitor response to electrolyte replacements, including repeat lab results as appropriate  - Instruct patient on fluid and nutrition as appropriate  Outcome: Progressing  Goal: Fluid balance maintained  Description: INTERVENTIONS:  - Monitor labs   - Monitor I/O and WT  - Instruct patient on fluid and nutrition as appropriate  - Assess for signs & symptoms of volume excess or deficit  Outcome: Progressing     Problem: SKIN/TISSUE INTEGRITY - ADULT  Goal: Skin Integrity remains intact(Skin Breakdown Prevention)  Description: Assess:  -Perform Ángel assessment every 2  -Clean and moisturize skin every shift  -Inspect skin when repositioning, toileting, and assisting with ADLS  -Assess under medical devices such as shift every   -Assess extremities for adequate circulation and sensation     Bed Management:  -Have minimal linens on bed & keep smooth, unwrinkled  -Change linens as needed when moist or perspiring  -Avoid sitting or lying in one position for more than 2 hours while in bed  -Keep HOB at 30 degrees     Toileting:  -Offer bedside commode  -Assess for incontinence every hour    Activity:  -Mobilize patient 3 times a day  -Encourage activity and walks on unit  -Encourage or provide ROM exercises   -Turn and reposition patient every 2 Hours  -Use appropriate equipment to lift or move patient in bed  -Instruct/ Assist with weight shifting every 2 hours when out of bed in chair  -Consider limitation of chair time  hours  hour intervals    Skin Care:  -Avoid use of baby powder, tape, friction and shearing, hot water or constrictive clothing  -Do not massage red bony areas    Outcome: Progressing     Problem: HEMATOLOGIC - ADULT  Goal: Maintains hematologic stability  Description: INTERVENTIONS  - Assess for signs and symptoms of bleeding or hemorrhage  - Monitor labs  - Administer supportive blood products/factors as ordered and appropriate  Outcome: Progressing     Problem: MUSCULOSKELETAL - ADULT  Goal: Maintain or return mobility to safest level of function  Description: INTERVENTIONS:  - Assess patient's ability to carry out ADLs; assess patient's baseline for ADL function and identify physical deficits which impact ability to perform ADLs (bathing, care of mouth/teeth, toileting, grooming, dressing, etc )  - Assess/evaluate cause of self-care deficits   - Assess range of motion  - Assess patient's mobility  - Assess patient's need for assistive devices and provide as appropriate  - Encourage maximum independence but intervene and supervise when necessary  - Involve family in performance of ADLs  - Assess for home care needs following discharge   - Consider OT consult to assist with ADL evaluation and planning for discharge  - Provide patient education as appropriate  Outcome: Progressing  Goal: Maintain proper alignment of affected body part  Description: INTERVENTIONS:  - Support, maintain and protect limb and body alignment  - Provide patient/ family with appropriate education  Outcome: Progressing     Problem: Potential for Falls  Goal: Patient will remain free of falls  Description: INTERVENTIONS:  - Educate patient/family on patient safety including physical limitations  - Instruct patient to call for assistance with activity   - Consult OT/PT to assist with strengthening/mobility   - Keep Call bell within reach  - Keep bed low and locked with side rails adjusted as appropriate  - Keep care items and personal belongings within reach  - Initiate and maintain comfort rounds  - Make Fall Risk Sign visible to staff  - Offer Toileting every 2 Hours, in advance of need  - Initiate/Maintain bed alarm  - Obtain necessary fall risk management equipment:   - Apply yellow socks and bracelet for high fall risk patients  - Consider moving patient to room near nurses station  Outcome: Progressing     Problem: Prexisting or High Potential for Compromised Skin Integrity  Goal: Skin integrity is maintained or improved  Description: INTERVENTIONS:  - Identify patients at risk for skin breakdown  - Assess and monitor skin integrity  - Assess and monitor nutrition and hydration status  - Monitor labs   - Assess for incontinence   - Turn and reposition patient  - Assist with mobility/ambulation  - Relieve pressure over bony prominences  - Avoid friction and shearing  - Provide appropriate hygiene as needed including keeping skin clean and dry  - Evaluate need for skin moisturizer/barrier cream  - Collaborate with interdisciplinary team   - Patient/family teaching  - Consider wound care consult   Outcome: Progressing

## 2021-07-23 NOTE — PLAN OF CARE
Problem: MOBILITY - ADULT  Goal: Maintain or return to baseline ADL function  Description: INTERVENTIONS:  -  Assess patient's ability to carry out ADLs; assess patient's baseline for ADL function and identify physical deficits which impact ability to perform ADLs (bathing, care of mouth/teeth, toileting, grooming, dressing, etc )  - Assess/evaluate cause of self-care deficits   - Assess range of motion  - Assess patient's mobility; develop plan if impaired  - Assess patient's need for assistive devices and provide as appropriate  - Encourage maximum independence but intervene and supervise when necessary  - Involve family in performance of ADLs  - Assess for home care needs following discharge   - Consider OT consult to assist with ADL evaluation and planning for discharge  - Provide patient education as appropriate  7/23/2021 1502 by Veronica Corrales LPN  Outcome: Adequate for Discharge  7/23/2021 0949 by Veronica Corrales LPN  Outcome: Progressing  Goal: Maintains/Returns to pre admission functional level  Description: INTERVENTIONS:  - Perform BMAT or MOVE assessment daily    - Set and communicate daily mobility goal to care team and patient/family/caregiver  - Collaborate with rehabilitation services on mobility goals if consulted  - Perform Range of Motion 3 times a day  - Reposition patient every 2 hours    - Dangle patient 2 times a day (as able d/t CBI)  - Stand patient 2 times a day (as able d/t CBI)  - Ambulate patient 3 times a day (as able d/t CBI)  - Out of bed to chair 3 times a day (as able d/t CBI)  - Out of bed for meals 3 times a day (as able d/t CBI)  - Out of bed for toileting (as able d/t CBI)  - Record patient progress and toleration of activity level   7/23/2021 1502 by Veronica Corrales LPN  Outcome: Adequate for Discharge  7/23/2021 0949 by Veronica Corrales LPN  Outcome: Progressing     Problem: PAIN - ADULT  Goal: Verbalizes/displays adequate comfort level or baseline comfort level  Description: Interventions:  - Encourage patient to monitor pain and request assistance  - Assess pain using appropriate pain scale (0-10 pain scale)  - Administer analgesics based on type and severity of pain and evaluate response  - Implement non-pharmacological measures as appropriate and evaluate response  - Consider cultural and social influences on pain and pain management  - Notify physician/advanced practitioner if interventions unsuccessful or patient reports new pain  7/23/2021 1502 by Dacia Blevnis LPN  Outcome: Adequate for Discharge  7/23/2021 0949 by Dacia Blevins LPN  Outcome: Progressing     Problem: INFECTION - ADULT  Goal: Absence or prevention of progression during hospitalization  Description: INTERVENTIONS:  - Assess and monitor for signs and symptoms of infection  - Monitor lab/diagnostic results  - Monitor all insertion sites, i e  indwelling lines, tubes, and drains  - Monitor endotracheal if appropriate and nasal secretions for changes in amount and color  - [place] appropriate cooling/warming therapies per order  - Administer medications as ordered  - Instruct and encourage patient and family to use good hand hygiene technique  - Identify and instruct in appropriate isolation precautions for identified infection/condition  7/23/2021 1502 by Dacia Blevins LPN  Outcome: Adequate for Discharge  7/23/2021 0949 by Dacia Blevins LPN  Outcome: Progressing  Goal: Absence of fever/infection during neutropenic period  Description: INTERVENTIONS:  - Monitor WBC    7/23/2021 1502 by Dacia Blevins LPN  Outcome: Adequate for Discharge  7/23/2021 0949 by Dacia Blevins LPN  Outcome: Progressing     Problem: SAFETY ADULT  Goal: Maintain or return to baseline ADL function  Description: INTERVENTIONS:  -  Assess patient's ability to carry out ADLs; assess patient's baseline for ADL function and identify physical deficits which impact ability to perform ADLs (bathing, care of mouth/teeth, toileting, grooming, dressing, etc )  - Assess/evaluate cause of self-care deficits   - Assess range of motion  - Assess patient's mobility; develop plan if impaired  - Assess patient's need for assistive devices and provide as appropriate  - Encourage maximum independence but intervene and supervise when necessary  - Involve family in performance of ADLs  - Assess for home care needs following discharge   - Consider OT consult to assist with ADL evaluation and planning for discharge  - Provide patient education as appropriate  7/23/2021 1502 by Leticai Trejo LPN  Outcome: Adequate for Discharge  7/23/2021 0949 by Leticia Trejo LPN  Outcome: Progressing  Goal: Maintains/Returns to pre admission functional level  Description: INTERVENTIONS:  - Perform BMAT or MOVE assessment daily    - Set and communicate daily mobility goal to care team and patient/family/caregiver  - Collaborate with rehabilitation services on mobility goals if consulted  - Perform Range of Motion 3 times a day  - Reposition patient every 2 hours    - Dangle patient 2 times a day (as able d/t CBI)  - Stand patient 2 times a day (as able d/t CBI)  - Ambulate patient 3 times a day (as able d/t CBI)  - Out of bed to chair 3 times a day (as able d/t CBI)  - Out of bed for meals 3 times a day (as able d/t CBI)  - Out of bed for toileting (as able d/t CBI)  - Record patient progress and toleration of activity level   7/23/2021 1502 by Leticia Trejo LPN  Outcome: Adequate for Discharge  7/23/2021 0949 by Leticia Trejo LPN  Outcome: Progressing  Goal: Patient will remain free of falls  Description: INTERVENTIONS:  - Educate patient/family on patient safety including physical limitations  - Instruct patient to call for assistance with activity   - Consult OT/PT to assist with strengthening/mobility   - Keep Call bell within reach  - Keep bed low and locked with side rails adjusted as appropriate  - Keep care items and personal belongings within reach  - Initiate and maintain comfort rounds  - Make Fall Risk Sign visible to staff  - Offer Toileting every 2 Hours, in advance of need  - Initiate/Maintain bed alarm  - Obtain necessary fall risk management equipment:   - Apply yellow socks and bracelet for high fall risk patients  - Consider moving patient to room near nurses station  7/23/2021 1502 by Danilo Reyes LPN  Outcome: Adequate for Discharge  7/23/2021 0949 by Danilo Reyes LPN  Outcome: Progressing     Problem: DISCHARGE PLANNING  Goal: Discharge to home or other facility with appropriate resources  Description: INTERVENTIONS:  - Identify barriers to discharge w/patient and caregiver  - Arrange for needed discharge resources and transportation as appropriate  - Identify discharge learning needs (meds, wound care, etc )  - Arrange for interpretive services to assist at discharge as needed  - Refer to Case Management Department for coordinating discharge planning if the patient needs post-hospital services based on physician/advanced practitioner order or complex needs related to functional status, cognitive ability, or social support system  7/23/2021 1502 by Danilo Reyes LPN  Outcome: Adequate for Discharge  7/23/2021 0949 by Danilo Reyes LPN  Outcome: Progressing     Problem: Knowledge Deficit  Goal: Patient/family/caregiver demonstrates understanding of disease process, treatment plan, medications, and discharge instructions  Description: Complete learning assessment and assess knowledge base    Interventions:  - Provide teaching at level of understanding  - Provide teaching via preferred learning methods  7/23/2021 1502 by Danilo Reyes LPN  Outcome: Adequate for Discharge  7/23/2021 0949 by Danilo Reyes LPN  Outcome: Progressing     Problem: CARDIOVASCULAR - ADULT  Goal: Maintains optimal cardiac output and hemodynamic stability  Description: INTERVENTIONS:  - Monitor I/O, vital signs and rhythm  - Monitor for S/S and trends of decreased cardiac output  - Administer and titrate ordered vasoactive medications to optimize hemodynamic stability  - Assess quality of pulses, skin color and temperature  - Assess for signs of decreased coronary artery perfusion  - Instruct patient to report change in severity of symptoms  7/23/2021 1502 by Talib Tom LPN  Outcome: Adequate for Discharge  7/23/2021 0949 by Talib Tom LPN  Outcome: Progressing  Goal: Absence of cardiac dysrhythmias or at baseline rhythm  Description: INTERVENTIONS:  - Continuous cardiac monitoring, vital signs, obtain 12 lead EKG if ordered  - Administer antiarrhythmic and heart rate control medications as ordered  - Monitor electrolytes and administer replacement therapy as ordered  7/23/2021 1502 by Talib Tom LPN  Outcome: Adequate for Discharge  7/23/2021 0949 by Talib Tom LPN  Outcome: Progressing     Problem: RESPIRATORY - ADULT  Goal: Achieves optimal ventilation and oxygenation  Description: INTERVENTIONS:  - Assess for changes in respiratory status  - Assess for changes in mentation and behavior  - Position to facilitate oxygenation and minimize respiratory effort  - Oxygen administered by appropriate delivery if ordered  - Initiate smoking cessation education as indicated  - Encourage broncho-pulmonary hygiene including cough, deep breathe, Incentive Spirometry  - Assess the need for suctioning and aspirate as needed  - Assess and instruct to report SOB or any respiratory difficulty  - Respiratory Therapy support as indicated  7/23/2021 1502 by Talib Tom LPN  Outcome: Adequate for Discharge  7/23/2021 0949 by Talib Tom LPN  Outcome: Progressing     Problem: GASTROINTESTINAL - ADULT  Goal: Minimal or absence of nausea and/or vomiting  Description: INTERVENTIONS:  - Administer IV fluids if ordered to ensure adequate hydration  - Maintain NPO status until nausea and vomiting are resolved  - Nasogastric tube if ordered  - Administer ordered antiemetic medications as needed  - Provide nonpharmacologic comfort measures as appropriate  - Advance diet as tolerated, if ordered  - Consider nutrition services referral to assist patient with adequate nutrition and appropriate food choices  7/23/2021 1502 by Missy Todd LPN  Outcome: Adequate for Discharge  7/23/2021 0949 by Missy Todd LPN  Outcome: Progressing  Goal: Maintains or returns to baseline bowel function  Description: INTERVENTIONS:  - Assess bowel function  - Encourage oral fluids to ensure adequate hydration  - Administer IV fluids if ordered to ensure adequate hydration  - Administer ordered medications as needed  - Encourage mobilization and activity  - Consider nutritional services referral to assist patient with adequate nutrition and appropriate food choices  7/23/2021 1502 by Missy Todd LPN  Outcome: Adequate for Discharge  7/23/2021 0949 by Missy Todd LPN  Outcome: Progressing  Goal: Maintains adequate nutritional intake  Description: INTERVENTIONS:  - Monitor percentage of each meal consumed  - Identify factors contributing to decreased intake, treat as appropriate  - Assist with meals as needed  - Monitor I&O, weight, and lab values if indicated  - Obtain nutrition services referral as needed  7/23/2021 1502 by Missy Todd LPN  Outcome: Adequate for Discharge  7/23/2021 0949 by Missy Todd LPN  Outcome: Progressing  Goal: Oral mucous membranes remain intact  Description: INTERVENTIONS  - Assess oral mucosa and hygiene practices  - Implement preventative oral hygiene regimen  - Implement oral medicated treatments as ordered  - Initiate Nutrition services referral as needed  7/23/2021 1502 by Missy Todd LPN  Outcome: Adequate for Discharge  7/23/2021 0949 by Missy Todd LPN  Outcome: Progressing     Problem: GENITOURINARY - ADULT  Goal: Maintains or returns to baseline urinary function  Description: INTERVENTIONS:  - Assess urinary function  - Encourage oral fluids to ensure adequate hydration if ordered  - Administer IV fluids as ordered to ensure adequate hydration  - Administer ordered medications as needed  - Offer frequent toileting  - Follow urinary retention protocol if ordered  7/23/2021 1502 by Marilyn Baxter LPN  Outcome: Adequate for Discharge  7/23/2021 0949 by Marilyn Baxter LPN  Outcome: Progressing  Goal: Absence of urinary retention  Description: INTERVENTIONS:  - Assess patients ability to void and empty bladder  - Monitor I/O  - Bladder scan as needed  - Discuss with physician/AP medications to alleviate retention as needed  - Discuss catheterization for long term situations as appropriate  7/23/2021 1502 by Marilyn Baxter LPN  Outcome: Adequate for Discharge  7/23/2021 0949 by Marilyn Baxter LPN  Outcome: Progressing  Goal: Urinary catheter remains patent  Description: INTERVENTIONS:  - Assess patency of urinary catheter  - If patient has a chronic oliveira, consider changing catheter if non-functioning  - Follow guidelines for intermittent irrigation of non-functioning urinary catheter  7/23/2021 1502 by Marilyn Baxter LPN  Outcome: Adequate for Discharge  7/23/2021 0949 by Marilyn Baxter LPN  Outcome: Progressing     Problem: METABOLIC, FLUID AND ELECTROLYTES - ADULT  Goal: Electrolytes maintained within normal limits  Description: INTERVENTIONS:  - Monitor labs and assess patient for signs and symptoms of electrolyte imbalances  - Administer electrolyte replacement as ordered  - Monitor response to electrolyte replacements, including repeat lab results as appropriate  - Instruct patient on fluid and nutrition as appropriate  7/23/2021 1502 by Marilyn Baxter LPN  Outcome: Adequate for Discharge  7/23/2021 0949 by Marilyn Baxter LPN  Outcome: Progressing  Goal: Fluid balance maintained  Description: INTERVENTIONS:  - Monitor labs   - Monitor I/O and WT  - Instruct patient on fluid and nutrition as appropriate  - Assess for signs & symptoms of volume excess or deficit  7/23/2021 1502 by Bart De Luna LPN  Outcome: Adequate for Discharge  7/23/2021 0949 by Bart De Luna LPN  Outcome: Progressing     Problem: SKIN/TISSUE INTEGRITY - ADULT  Goal: Skin Integrity remains intact(Skin Breakdown Prevention)  Description: Assess:  -Perform Ángel assessment every shift  -Clean and moisturize skin every shift  -Inspect skin when repositioning, toileting, and assisting with ADLS  -Assess under medical devices such as catheter every shift  -Assess extremities for adequate circulation and sensation     Bed Management:  -Have minimal linens on bed & keep smooth, unwrinkled  -Change linens as needed when moist or perspiring  -Avoid sitting or lying in one position for more than 2 hours while in bed  -Keep HOB at 30 degrees     Toileting:  -Offer bedside commode  -Assess for incontinence every 2 hour    Activity:  -Mobilize patient 3 times a day  -Encourage activity and walks on unit  -Encourage or provide ROM exercises   -Turn and reposition patient every 2 Hours  -Use appropriate equipment to lift or move patient in bed  -Instruct/ Assist with weight shifting every 2 when out of bed in chair  -Consider limitation of chair time 2 hour intervals    Skin Care:  -Avoid use of baby powder, tape, friction and shearing, hot water or constrictive clothing  -Do not massage red bony areas    Next Steps:  7/23/2021 1502 by Bart De Luna LPN  Outcome: Adequate for Discharge  7/23/2021 0949 by Bart De Luna LPN  Outcome: Progressing     Problem: HEMATOLOGIC - ADULT  Goal: Maintains hematologic stability  Description: INTERVENTIONS  - Assess for signs and symptoms of bleeding or hemorrhage  - Monitor labs  - Administer supportive blood products/factors as ordered and appropriate  7/23/2021 1502 by Bart De Luna LPN  Outcome: Adequate for Discharge  7/23/2021 0949 by Bart De Luna LPN  Outcome: Progressing     Problem: MUSCULOSKELETAL - ADULT  Goal: Maintain or return mobility to safest level of function  Description: INTERVENTIONS:  - Assess patient's ability to carry out ADLs; assess patient's baseline for ADL function and identify physical deficits which impact ability to perform ADLs (bathing, care of mouth/teeth, toileting, grooming, dressing, etc )  - Assess/evaluate cause of self-care deficits   - Assess range of motion  - Assess patient's mobility  - Assess patient's need for assistive devices and provide as appropriate  - Encourage maximum independence but intervene and supervise when necessary  - Involve family in performance of ADLs  - Assess for home care needs following discharge   - Consider OT consult to assist with ADL evaluation and planning for discharge  - Provide patient education as appropriate  7/23/2021 1502 by Jazzy mAador LPN  Outcome: Adequate for Discharge  7/23/2021 0949 by Jazzy Amador LPN  Outcome: Progressing  Goal: Maintain proper alignment of affected body part  Description: INTERVENTIONS:  - Support, maintain and protect limb and body alignment  - Provide patient/ family with appropriate education  7/23/2021 1502 by Jazzy Amador LPN  Outcome: Adequate for Discharge  7/23/2021 0949 by Jazzy Amador LPN  Outcome: Progressing     Problem: Potential for Falls  Goal: Patient will remain free of falls  Description: INTERVENTIONS:  - Educate patient/family on patient safety including physical limitations  - Instruct patient to call for assistance with activity   - Consult OT/PT to assist with strengthening/mobility   - Keep Call bell within reach  - Keep bed low and locked with side rails adjusted as appropriate  - Keep care items and personal belongings within reach  - Initiate and maintain comfort rounds  - Make Fall Risk Sign visible to staff  - Offer Toileting every 2 Hours, in advance of need  - Initiate/Maintain bed alarm  - Obtain necessary fall risk management equipment:   - Apply yellow socks and bracelet for high fall risk patients  - Consider moving patient to room near nurses station  7/23/2021 1502 by Elvi Shaw LPN  Outcome: Adequate for Discharge  7/23/2021 0949 by Elvi Shaw LPN  Outcome: Progressing     Problem: Prexisting or High Potential for Compromised Skin Integrity  Goal: Skin integrity is maintained or improved  Description: INTERVENTIONS:  - Identify patients at risk for skin breakdown  - Assess and monitor skin integrity  - Assess and monitor nutrition and hydration status  - Monitor labs   - Assess for incontinence   - Turn and reposition patient  - Assist with mobility/ambulation  - Relieve pressure over bony prominences  - Avoid friction and shearing  - Provide appropriate hygiene as needed including keeping skin clean and dry  - Evaluate need for skin moisturizer/barrier cream  - Collaborate with interdisciplinary team   - Patient/family teaching  - Consider wound care consult   7/23/2021 1502 by Elvi Shaw LPN  Outcome: Adequate for Discharge  7/23/2021 0949 by Elvi Shaw LPN  Outcome: Progressing

## 2021-07-23 NOTE — PLAN OF CARE
Problem: MOBILITY - ADULT  Goal: Maintain or return to baseline ADL function  Description: INTERVENTIONS:  -  Assess patient's ability to carry out ADLs; assess patient's baseline for ADL function and identify physical deficits which impact ability to perform ADLs (bathing, care of mouth/teeth, toileting, grooming, dressing, etc )  - Assess/evaluate cause of self-care deficits   - Assess range of motion  - Assess patient's mobility; develop plan if impaired  - Assess patient's need for assistive devices and provide as appropriate  - Encourage maximum independence but intervene and supervise when necessary  - Involve family in performance of ADLs  - Assess for home care needs following discharge   - Consider OT consult to assist with ADL evaluation and planning for discharge  - Provide patient education as appropriate  Outcome: Progressing  Goal: Maintains/Returns to pre admission functional level  Description: INTERVENTIONS:  - Perform BMAT or MOVE assessment daily    - Set and communicate daily mobility goal to care team and patient/family/caregiver  - Collaborate with rehabilitation services on mobility goals if consulted  - Perform Range of Motion 3 times a day  - Reposition patient every 2 hours    - Dangle patient 2 times a day (as able d/t CBI)  - Stand patient 2 times a day (as able d/t CBI)  - Ambulate patient 3 times a day (as able d/t CBI)  - Out of bed to chair 3 times a day (as able d/t CBI)  - Out of bed for meals 3 times a day (as able d/t CBI)  - Out of bed for toileting (as able d/t CBI)  - Record patient progress and toleration of activity level   Outcome: Progressing     Problem: PAIN - ADULT  Goal: Verbalizes/displays adequate comfort level or baseline comfort level  Description: Interventions:  - Encourage patient to monitor pain and request assistance  - Assess pain using appropriate pain scale (0-10 pain scale)  - Administer analgesics based on type and severity of pain and evaluate response  - Implement non-pharmacological measures as appropriate and evaluate response  - Consider cultural and social influences on pain and pain management  - Notify physician/advanced practitioner if interventions unsuccessful or patient reports new pain  Outcome: Progressing     Problem: INFECTION - ADULT  Goal: Absence or prevention of progression during hospitalization  Description: INTERVENTIONS:  - Assess and monitor for signs and symptoms of infection  - Monitor lab/diagnostic results  - Monitor all insertion sites, i e  indwelling lines, tubes, and drains  - Monitor endotracheal if appropriate and nasal secretions for changes in amount and color  - [place] appropriate cooling/warming therapies per order  - Administer medications as ordered  - Instruct and encourage patient and family to use good hand hygiene technique  - Identify and instruct in appropriate isolation precautions for identified infection/condition  Outcome: Progressing  Goal: Absence of fever/infection during neutropenic period  Description: INTERVENTIONS:  - Monitor WBC    Outcome: Progressing     Problem: SAFETY ADULT  Goal: Maintain or return to baseline ADL function  Description: INTERVENTIONS:  -  Assess patient's ability to carry out ADLs; assess patient's baseline for ADL function and identify physical deficits which impact ability to perform ADLs (bathing, care of mouth/teeth, toileting, grooming, dressing, etc )  - Assess/evaluate cause of self-care deficits   - Assess range of motion  - Assess patient's mobility; develop plan if impaired  - Assess patient's need for assistive devices and provide as appropriate  - Encourage maximum independence but intervene and supervise when necessary  - Involve family in performance of ADLs  - Assess for home care needs following discharge   - Consider OT consult to assist with ADL evaluation and planning for discharge  - Provide patient education as appropriate  Outcome: Progressing  Goal: Maintains/Returns to pre admission functional level  Description: INTERVENTIONS:  - Perform BMAT or MOVE assessment daily    - Set and communicate daily mobility goal to care team and patient/family/caregiver  - Collaborate with rehabilitation services on mobility goals if consulted  - Perform Range of Motion 3 times a day  - Reposition patient every 2 hours    - Dangle patient 2 times a day (as able d/t CBI)  - Stand patient 2 times a day (as able d/t CBI)  - Ambulate patient 3 times a day (as able d/t CBI)  - Out of bed to chair 3 times a day (as able d/t CBI)  - Out of bed for meals 3 times a day (as able d/t CBI)  - Out of bed for toileting (as able d/t CBI)  - Record patient progress and toleration of activity level   Outcome: Progressing  Goal: Patient will remain free of falls  Description: INTERVENTIONS:  - Educate patient/family on patient safety including physical limitations  - Instruct patient to call for assistance with activity   - Consult OT/PT to assist with strengthening/mobility   - Keep Call bell within reach  - Keep bed low and locked with side rails adjusted as appropriate  - Keep care items and personal belongings within reach  - Initiate and maintain comfort rounds  - Make Fall Risk Sign visible to staff  - Offer Toileting every 4 Hours, in advance of need  - Initiate/Maintain bed alarm  - Obtain necessary fall risk management equipment  - Apply yellow socks and bracelet for high fall risk patients  - Consider moving patient to room near nurses station  Outcome: Progressing     Problem: DISCHARGE PLANNING  Goal: Discharge to home or other facility with appropriate resources  Description: INTERVENTIONS:  - Identify barriers to discharge w/patient and caregiver  - Arrange for needed discharge resources and transportation as appropriate  - Identify discharge learning needs (meds, wound care, etc )  - Arrange for interpretive services to assist at discharge as needed  - Refer to Case Management Department for coordinating discharge planning if the patient needs post-hospital services based on physician/advanced practitioner order or complex needs related to functional status, cognitive ability, or social support system  Outcome: Progressing     Problem: Knowledge Deficit  Goal: Patient/family/caregiver demonstrates understanding of disease process, treatment plan, medications, and discharge instructions  Description: Complete learning assessment and assess knowledge base    Interventions:  - Provide teaching at level of understanding  - Provide teaching via preferred learning methods  Outcome: Progressing     Problem: CARDIOVASCULAR - ADULT  Goal: Maintains optimal cardiac output and hemodynamic stability  Description: INTERVENTIONS:  - Monitor I/O, vital signs and rhythm  - Monitor for S/S and trends of decreased cardiac output  - Administer and titrate ordered vasoactive medications to optimize hemodynamic stability  - Assess quality of pulses, skin color and temperature  - Assess for signs of decreased coronary artery perfusion  - Instruct patient to report change in severity of symptoms  Outcome: Progressing  Goal: Absence of cardiac dysrhythmias or at baseline rhythm  Description: INTERVENTIONS:  - Continuous cardiac monitoring, vital signs, obtain 12 lead EKG if ordered  - Administer antiarrhythmic and heart rate control medications as ordered  - Monitor electrolytes and administer replacement therapy as ordered  Outcome: Progressing     Problem: RESPIRATORY - ADULT  Goal: Achieves optimal ventilation and oxygenation  Description: INTERVENTIONS:  - Assess for changes in respiratory status  - Assess for changes in mentation and behavior  - Position to facilitate oxygenation and minimize respiratory effort  - Oxygen administered by appropriate delivery if ordered  - Initiate smoking cessation education as indicated  - Encourage broncho-pulmonary hygiene including cough, deep breathe, Incentive Spirometry  - Assess the need for suctioning and aspirate as needed  - Assess and instruct to report SOB or any respiratory difficulty  - Respiratory Therapy support as indicated  Outcome: Progressing     Problem: GASTROINTESTINAL - ADULT  Goal: Minimal or absence of nausea and/or vomiting  Description: INTERVENTIONS:  - Administer IV fluids if ordered to ensure adequate hydration  - Maintain NPO status until nausea and vomiting are resolved  - Nasogastric tube if ordered  - Administer ordered antiemetic medications as needed  - Provide nonpharmacologic comfort measures as appropriate  - Advance diet as tolerated, if ordered  - Consider nutrition services referral to assist patient with adequate nutrition and appropriate food choices  Outcome: Progressing  Goal: Maintains or returns to baseline bowel function  Description: INTERVENTIONS:  - Assess bowel function  - Encourage oral fluids to ensure adequate hydration  - Administer IV fluids if ordered to ensure adequate hydration  - Administer ordered medications as needed  - Encourage mobilization and activity  - Consider nutritional services referral to assist patient with adequate nutrition and appropriate food choices  Outcome: Progressing  Goal: Maintains adequate nutritional intake  Description: INTERVENTIONS:  - Monitor percentage of each meal consumed  - Identify factors contributing to decreased intake, treat as appropriate  - Assist with meals as needed  - Monitor I&O, weight, and lab values if indicated  - Obtain nutrition services referral as needed  Outcome: Progressing  Goal: Oral mucous membranes remain intact  Description: INTERVENTIONS  - Assess oral mucosa and hygiene practices  - Implement preventative oral hygiene regimen  - Implement oral medicated treatments as ordered  - Initiate Nutrition services referral as needed  Outcome: Progressing     Problem: GENITOURINARY - ADULT  Goal: Maintains or returns to baseline urinary function  Description: INTERVENTIONS:  - Assess urinary function  - Encourage oral fluids to ensure adequate hydration if ordered  - Administer IV fluids as ordered to ensure adequate hydration  - Administer ordered medications as needed  - Offer frequent toileting  - Follow urinary retention protocol if ordered  Outcome: Progressing  Goal: Absence of urinary retention  Description: INTERVENTIONS:  - Assess patients ability to void and empty bladder  - Monitor I/O  - Bladder scan as needed  - Discuss with physician/AP medications to alleviate retention as needed  - Discuss catheterization for long term situations as appropriate  Outcome: Progressing  Goal: Urinary catheter remains patent  Description: INTERVENTIONS:  - Assess patency of urinary catheter  - If patient has a chronic oliveira, consider changing catheter if non-functioning  - Follow guidelines for intermittent irrigation of non-functioning urinary catheter  Outcome: Progressing     Problem: METABOLIC, FLUID AND ELECTROLYTES - ADULT  Goal: Electrolytes maintained within normal limits  Description: INTERVENTIONS:  - Monitor labs and assess patient for signs and symptoms of electrolyte imbalances  - Administer electrolyte replacement as ordered  - Monitor response to electrolyte replacements, including repeat lab results as appropriate  - Instruct patient on fluid and nutrition as appropriate  Outcome: Progressing  Goal: Fluid balance maintained  Description: INTERVENTIONS:  - Monitor labs   - Monitor I/O and WT  - Instruct patient on fluid and nutrition as appropriate  - Assess for signs & symptoms of volume excess or deficit  Outcome: Progressing     Problem: SKIN/TISSUE INTEGRITY - ADULT  Goal: Skin Integrity remains intact(Skin Breakdown Prevention)  Description: Assess:  -Perform Ángel assessment every shift   -Clean and moisturize skin every shift  -Inspect skin when repositioning, toileting, and assisting with ADLS  -Assess under medical devices such as SCDs and masimos every shift  -Assess extremities for adequate circulation and sensation     Bed Management:  -Have minimal linens on bed & keep smooth, unwrinkled  -Change linens as needed when moist or perspiring  -Avoid sitting or lying in one position for more than 2 hours while in bed  -Keep HOB at 35 degrees     Toileting:  -Offer bedside commode (as able d/t CBI)    Activity:  -Mobilize patient 3 times a day (as able d/t CBI)  -Encourage activity and walks on unit  -Encourage or provide ROM exercises   -Turn and reposition patient every 2 Hours  -Use appropriate equipment to lift or move patient in bed  -Instruct/ Assist with weight shifting every 2 hours when out of bed in chair  -Consider limitation of chair time 5 hour intervals    Skin Care:  -Avoid use of baby powder, tape, friction and shearing, hot water or constrictive clothing  -Relieve pressure over bony prominences using pillows  -Do not massage red bony areas    Outcome: Progressing     Problem: HEMATOLOGIC - ADULT  Goal: Maintains hematologic stability  Description: INTERVENTIONS  - Assess for signs and symptoms of bleeding or hemorrhage  - Monitor labs  - Administer supportive blood products/factors as ordered and appropriate  Outcome: Progressing     Problem: MUSCULOSKELETAL - ADULT  Goal: Maintain or return mobility to safest level of function  Description: INTERVENTIONS:  - Assess patient's ability to carry out ADLs; assess patient's baseline for ADL function and identify physical deficits which impact ability to perform ADLs (bathing, care of mouth/teeth, toileting, grooming, dressing, etc )  - Assess/evaluate cause of self-care deficits   - Assess range of motion  - Assess patient's mobility  - Assess patient's need for assistive devices and provide as appropriate  - Encourage maximum independence but intervene and supervise when necessary  - Involve family in performance of ADLs  - Assess for home care needs following discharge   - Consider OT consult to assist with ADL evaluation and planning for discharge  - Provide patient education as appropriate  Outcome: Progressing  Goal: Maintain proper alignment of affected body part  Description: INTERVENTIONS:  - Support, maintain and protect limb and body alignment  - Provide patient/ family with appropriate education  Outcome: Progressing     Problem: Potential for Falls  Goal: Patient will remain free of falls  Description: INTERVENTIONS:  - Educate patient/family on patient safety including physical limitations  - Instruct patient to call for assistance with activity   - Consult OT/PT to assist with strengthening/mobility   - Keep Call bell within reach  - Keep bed low and locked with side rails adjusted as appropriate  - Keep care items and personal belongings within reach  - Initiate and maintain comfort rounds  - Make Fall Risk Sign visible to staff  - Offer Toileting every 4 Hours, in advance of need  - Initiate/Maintain bed alarm  - Obtain necessary fall risk management equipment  - Apply yellow socks and bracelet for high fall risk patients  - Consider moving patient to room near nurses station  Outcome: Progressing     Problem: Prexisting or High Potential for Compromised Skin Integrity  Goal: Skin integrity is maintained or improved  Description: INTERVENTIONS:  - Identify patients at risk for skin breakdown  - Assess and monitor skin integrity  - Assess and monitor nutrition and hydration status  - Monitor labs   - Assess for incontinence   - Turn and reposition patient  - Assist with mobility/ambulation  - Relieve pressure over bony prominences  - Avoid friction and shearing  - Provide appropriate hygiene as needed including keeping skin clean and dry  - Evaluate need for skin moisturizer/barrier cream  - Collaborate with interdisciplinary team   - Patient/family teaching  - Consider wound care consult   Outcome: Progressing

## 2021-07-23 NOTE — CASE MANAGEMENT
Met with pt to discuss role as  in helping pt to develop discharge plan and to help pt carry out their plan  Pt's current LOS is 1 day   Pt is not a 30 day readmission  Pt lives in house with his son  Pt has 0 ARIAN  Pt has 13 steps on the inside  Pt has his bedroom and bathroom on the 1 rst floor  Pt has a cane and a walker  Pt is not using a device   Pt is independent with ADL's and functional mobility   Pt's son does the cooking  Pt's son drives  Pt has never had home care or any services in the home  Pt has never been to [facility]  Pt has no history of Substance abuse  [facility]   Pref[facility]: [facility]  Pt with no barriers to getting medications or picking up medications  Case Management met with the patient to evaluate the patient's prior level of functioning and living situation and any barriers to discharge and to forming a safe discharge plan  CM also evaluated the patient for any services in the home or need for services  Case management also notified patient that their preferences will be take into consideration when developing their discharge plan        Pt 's son Idris Pierre is patient's primary contact ([phone])   Pt's son will give the patient a ride home

## 2021-07-26 ENCOUNTER — PROCEDURE VISIT (OUTPATIENT)
Dept: UROLOGY | Facility: CLINIC | Age: 81
End: 2021-07-26
Payer: MEDICARE

## 2021-07-26 ENCOUNTER — TELEPHONE (OUTPATIENT)
Dept: UROLOGY | Facility: CLINIC | Age: 81
End: 2021-07-26

## 2021-07-26 DIAGNOSIS — R33.9 URINARY RETENTION: ICD-10-CM

## 2021-07-26 DIAGNOSIS — R39.14 BENIGN PROSTATIC HYPERPLASIA WITH INCOMPLETE BLADDER EMPTYING: Primary | ICD-10-CM

## 2021-07-26 DIAGNOSIS — N40.1 BENIGN PROSTATIC HYPERPLASIA WITH INCOMPLETE BLADDER EMPTYING: Primary | ICD-10-CM

## 2021-07-26 PROCEDURE — 1124F ACP DISCUSS-NO DSCNMKR DOCD: CPT

## 2021-07-26 PROCEDURE — 99211 OFF/OP EST MAY X REQ PHY/QHP: CPT

## 2021-07-26 NOTE — TELEPHONE ENCOUNTER
Hi,    Please advise, pt has an appt 8/6/21 for post op appt with Lexii Kasper  He was seen today 7/26/21, and then I scheduled him for a 4 wk SPT change August 23rd, 2021 with Lexii Kasper per clinical     Does the patient need to come in 8/6/21 next week? Please advise? Thank you!

## 2021-07-26 NOTE — PROGRESS NOTES
I supervised the Advanced Practitioner  I reviewed the Advanced Practitioner note and agree  I personally examined the patient and discussed the situation with he and his son  We reviewed his instructions regarding capping and uncapping the suprapubic tube  The wound is clean dry intact where the SP tube was inserted  He looks good  His urine is clear      Alex Magana MD 07/26/21

## 2021-07-26 NOTE — PROGRESS NOTES
Procedures  Pt seen in office to have oliveira cath removal and trained how to monitor amount of urine that he voids through his penis vs s/p tube  Pt and his son were asked to write down time and amount he voids and amount of s/p  Pt was placed in supine position  Oliveira balloon was completely drained and gently removed  Pt had no complications  Pt son was in the office to help pt understand how to measure the amount of urine that he voids through his penis and the amount through his s/p tube  Pt will be seen in 6 weeks per Dr Jessica Gaffney to have the first s/p tube changed by PA and to review his papers of his voiding

## 2021-07-27 NOTE — TELEPHONE ENCOUNTER
Called and spoke with patients son  Informed patients son that patient should unplug his SPT and urinate that way  Patient to do this every 4 hours  Patients son verbalized understanding

## 2021-07-27 NOTE — TELEPHONE ENCOUNTER
Spoke to Lilia Brendon (son of pt) and notified that appt for 8/6/21 has been cancelled  Also, notified that I moved up his Spt change appt to 8/23/21, son confirmed appt   Saqib stated that pt has not gone to the bathroom since the catheter was removed yesterday, and would like a call  Please advise  Thank you!

## 2021-08-02 ENCOUNTER — TELEPHONE (OUTPATIENT)
Dept: UROLOGY | Facility: CLINIC | Age: 81
End: 2021-08-02

## 2021-08-02 DIAGNOSIS — C61 PROSTATE CANCER (HCC): Primary | ICD-10-CM

## 2021-08-02 NOTE — TELEPHONE ENCOUNTER
I called the patient's son Sandee Quiroz and let him know that he has father has prostate cancer [place] 7 in the prostate chips  I spoke with his son because the patient is hard of hearing and often does not remember things so he depends on Sandee Quiroz  Given that the patient is [de-identified]years old, I am not going to pursue a peripheral zone biopsy but we are going to get a baseline PSA and then simply follow-up  If it goes up to uncomfortable levels I will give him intermittent androgen deprivation therapy  He will take his father to get a PSA, the patient has a follow-up with James Foreman on Friday to see how he is doing- he told me that he is urinating some but he still has residuals through the suprapubic tube  He will then follow-up in 6 months with a PSA

## 2021-08-16 ENCOUNTER — APPOINTMENT (OUTPATIENT)
Dept: LAB | Facility: CLINIC | Age: 81
End: 2021-08-16
Payer: MEDICARE

## 2021-08-16 ENCOUNTER — TELEPHONE (OUTPATIENT)
Dept: UROLOGY | Facility: MEDICAL CENTER | Age: 81
End: 2021-08-16

## 2021-08-16 DIAGNOSIS — R39.9 UTI SYMPTOMS: Primary | ICD-10-CM

## 2021-08-16 DIAGNOSIS — C61 PROSTATE CANCER (HCC): ICD-10-CM

## 2021-08-16 LAB — PSA SERPL-MCNC: 1.7 NG/ML (ref 0–4)

## 2021-08-16 PROCEDURE — 84153 ASSAY OF PSA TOTAL: CPT

## 2021-08-16 NOTE — TELEPHONE ENCOUNTER
pts care is managed by the [place] office  Pt was last seen 7/26/21  pts son reporting pt complaining of spt insertion site discomfort  pts son states pt has not cleaned the tube insertion site since insertion  pts son states area smells bad  pts son has denied pt having any other symptoms  Pt is scheduled for 1 st spt tube change 8/23/21 in 1 week  pts son would like pt seen sooner      Please advise thank you

## 2021-08-17 ENCOUNTER — TELEPHONE (OUTPATIENT)
Dept: UROLOGY | Facility: CLINIC | Age: 81
End: 2021-08-17

## 2021-08-17 RX ORDER — CIPROFLOXACIN 500 MG/1
500 TABLET, FILM COATED ORAL EVERY 12 HOURS SCHEDULED
Qty: 14 TABLET | Refills: 0 | Status: SHIPPED | OUTPATIENT
Start: 2021-08-17 | End: 2021-08-24

## 2021-08-17 NOTE — TELEPHONE ENCOUNTER
Please let patient's son know that they should be washing the suprapubic site daily with soap and water and keeping it clean and dry  I will send a prescription for an antibiotic to his pharmacy on file  The medication will be Cipro he should take that twice a day until it is completed  They do not need to be seen any sooner  If he should develop a fever I would recommend an evaluation in the emergency room

## 2021-08-17 NOTE — TELEPHONE ENCOUNTER
Called and spoke with patients son  He is aware patients PSA is normal and next follow up appointment was confirmed

## 2021-08-17 NOTE — TELEPHONE ENCOUNTER
Called and spoke with patient's son Sandra Franco  Relayed Guerline's recommendations to him regarding cleansing SPT site daily  He is aware Cipro was called into the pharmacy for patient and he should take antibiotic until completion  Advised if patient develops a fever he should proceed to the emergency room  Son verbalized understanding

## 2021-08-22 NOTE — PROGRESS NOTES
Universal Protocol:  Consent: Verbal consent obtained  Written consent obtained  Risks and benefits: risks, benefits and alternatives were discussed  Consent given by: patient  Time out: Immediately prior to procedure a "time out" was called to verify the correct patient, procedure, equipment, support staff and site/side marked as required  Patient understanding: patient states understanding of the procedure being performed  Patient consent: the patient's understanding of the procedure matches consent given  Patient identity confirmed: verbally with patient      Bladder catheterization    Date/Time: 8/23/2021 9:31 AM  Performed by: DOLORES Leal  Authorized by: DOLORES Leal     Patient location:  Bedside  Consent:     Consent given by:  Patient  Universal protocol:     Procedure explained and questions answered to patient or proxy's satisfaction: yes      Immediately prior to procedure a time out was called: yes      Patient identity confirmed:  Verbally with patient  Pre-procedure details:     Procedure purpose:  Therapeutic    Preparation: Patient was prepped and draped in usual sterile fashion    Anesthesia (see MAR for exact dosages): Anesthesia method:  Topical application    Topical anesthetic:  Lidocaine gel  Procedure details:     Bladder irrigation: no      Catheter insertion:  Indwelling    Approach: percutaneous      Catheter type:  Latex    Catheter size:  18 Fr    Number of attempts:  1    Successful placement: yes      Urine characteristics:  Cloudy and yellow    Procedure performed by provider due to: First change after placement  Post-procedure details:     Patient tolerance of procedure: Tolerated well, no immediate complications  Comments:      Drained 200 mL of cloudy yellow urine    Patient ready on an antibiotic but will send for culture today to make sure he is on the correct antibiotic      Assessment and plan:     Benign prostatic hyperplasia with incomplete bladder emptying  ·   Status post TURP 07/22/2021  ·  continues with high volume retention through suprapubic tube  ·  changed suprapubic tube today  ·  continue monthly suprapubic tube changes  ·  recommended keeping track of how much he is urinating versus how much they're emptying  from his tube  · Send urine for culture to make sure he is on the appropriate antibiotic  ·  follow-up in 6 months for recheck        DOLORES Pal    History of Present Illness     Madelyn Ramirez is a [de-identified] y o  Male patient Dr Ally Ceballos who initially presented on 06/14/2021 for bilateral hydronephrosis  Secondary to BPH with obstruction and urinary retention  He was noted to have 2 L of retention and JENNIFER  He failed medical management  He underwent a transurethral resection of the prostate as well as suprapubic tube placement on 07/22/2021  His Britt catheter was removed and he was instructed to urinate through the urethra  If he was unable to urinate he was to unplug the suprapubic tube and empty the urine and measure the amount  He was also instructed to measure postvoid residual by opening the suprapubic tube  If he is consistently urinating through the penis and has < 100 mL left over will remove the suprapubic tube    void: HAS BEEN SMALL AMOUNTS  Suprapubic tube:  400 mL  Has been having high volume residuals at home  Will continue with SPT at this time    patient is on a not been measuring urine consistently at home  However upon emptying his SPT after voiding in the bathroom today he had at least 400 mL left over  Recommend we continue the suprapubic tube at this time  Will change his tube today        Laboratory     Lab Results   Component Value Date    BUN 26 (H) 06/28/2021    CREATININE 1 86 (H) 06/28/2021       No components found for: GFR    Lab Results   Component Value Date    CALCIUM 8 8 06/28/2021    K 3 8 06/28/2021    CO2 24 06/28/2021     06/28/2021       Lab Results   Component Value Date    WBC 5 80 06/28/2021    HGB 12 2 (L) 06/28/2021    HCT 36 7 (L) 06/28/2021    MCV 93 06/28/2021     06/28/2021       Lab Results   Component Value Date    PSA 1 7 08/16/2021       No results found for this or any previous visit (from the past 1 hour(s))  @RESULT(URINEMICROSCOPIC)@    @RESULT(URINECULTURE)@    Radiology       Review of Systems     Review of Systems   Constitutional: Negative for activity change, appetite change, chills, fatigue, fever and unexpected weight change  HENT: Negative for facial swelling  Eyes: Negative for discharge  Respiratory: Negative  Negative for cough and shortness of breath  Cardiovascular: Negative for chest pain and leg swelling  Gastrointestinal: Negative  Negative for abdominal distention, abdominal pain, constipation, diarrhea, nausea and vomiting  HAS Pain to the suprapubic site  Denies any drainage from the site  Denies any redness  Endocrine: Negative  Genitourinary: Positive for difficulty urinating  Negative for decreased urine volume, dysuria, enuresis, flank pain, frequency, genital sores, hematuria and urgency  SPT in place, with continued high volume residuals   Musculoskeletal: Negative for back pain and myalgias  Skin: Negative for pallor and rash  Allergic/Immunologic: Negative  Negative for immunocompromised state  Neurological: Negative for facial asymmetry and speech difficulty  Psychiatric/Behavioral: Negative for agitation and confusion  Allergies     No Known Allergies    Physical Exam     Physical Exam  Vitals reviewed  Constitutional:       General: He is not in acute distress  Appearance: Normal appearance  He is normal weight  He is not ill-appearing, toxic-appearing or diaphoretic  HENT:      Head: Normocephalic and atraumatic  Eyes:      General: No scleral icterus  Cardiovascular:      Rate and Rhythm: Normal rate  Pulmonary:      Effort: Pulmonary effort is normal  No respiratory distress  Abdominal:      General: Abdomen is flat  The ostomy site is clean  There is no distension  Palpations: Abdomen is soft  Tenderness: There is no abdominal tenderness  There is no guarding or rebound  Musculoskeletal:         General: No swelling  Cervical back: Normal range of motion  Right lower leg: No edema  Left lower leg: No edema  Skin:     General: Skin is warm and dry  Coloration: Skin is not jaundiced or pale  Findings: No rash  Neurological:      General: No focal deficit present  Mental Status: He is alert and oriented to person, place, and time  Gait: Gait normal    Psychiatric:         Mood and Affect: Mood normal          Behavior: Behavior normal          Thought Content:  Thought content normal          Judgment: Judgment normal            Vital Signs     Vitals:    08/23/21 0909   BP: 124/80   Weight: 74 8 kg (165 lb)   Height: 5' 4 5" (1 638 m)       Current Medications       Current Outpatient Medications:     cholecalciferol (VITAMIN D3) 1,000 units tablet, Take 2,000 Units by mouth daily, Disp: , Rfl:     ciprofloxacin (CIPRO) 500 mg tablet, Take 1 tablet (500 mg total) by mouth every 12 (twelve) hours for 7 days, Disp: 14 tablet, Rfl: 0    finasteride (PROSCAR) 5 mg tablet, TAKE ONE TABLET BY MOUTH DAILY FOR PROSTATE (BPH), Disp: , Rfl:     FLUoxetine (PROzac) 10 mg capsule, TAKE 1 CAPSULE BY MOUTH EVERY MORNING FOR MOOD, Disp: , Rfl:     HYDROcodone-acetaminophen (NORCO) 5-325 mg per tablet, Take 1-2 tablets by mouth every 6 (six) hours as needed for pain for up to 5 dosesMax Daily Amount: 8 tablets, Disp: 5 tablet, Rfl: 0    phenazopyridine (PYRIDIUM) 200 mg tablet, Take 1 tablet (200 mg total) by mouth 3 (three) times a day as needed for bladder spasms, Disp: 30 tablet, Rfl: 0    Zoster Vac Recomb Adjuvanted 50 MCG/0 5ML SUSR, INJECT 1 VIAL (0 5ML) INTRAMUSCULARLY ONCE **1ST DOSE**  GIVE FIRST DOSE NOW AND GIVE SECOND DOSE 2 TO 6 MONTHS AFTER INITIAL DOSE **1ST DOSE**  GIVE FIRST DOSE NOW AND GIVE SECOND DOSE 2 TO 6 MONTHS AFTER INITIAL DOSE, Disp: , Rfl:     tamsulosin (FLOMAX) 0 4 mg, Take 1 capsule (0 4 mg total) by mouth daily with dinner for 15 days (Patient taking differently: Take 0 4 mg by mouth 2 (two) times a day ), Disp: 15 capsule, Rfl: 0    Active Problems     Patient Active Problem List   Diagnosis    [facility]    Stage 3b [facility]    Benign prostatic hyperplasia with incomplete bladder emptying    Essential hypertension    Hematuria    Obstructive uropathy       Past Medical History     Past Medical History:   Diagnosis Date    Hypertension     Urinary retention        Surgical History     Past Surgical History:   Procedure Laterality Date    CATARACT EXTRACTION      HERNIA REPAIR      HIATAL HERNIA REPAIR      RI TRANSURETHRAL ELEC-SURG PROSTATECTOM N/A 7/22/2021    Procedure: TRANSURETHRAL RESECTION OF PROSTATE (TURP), cystoscopy;  Surgeon: Donn Montemayor MD;  Location: [facility];  Service: Urology    SHOULDER SURGERY Left     SUPRAPUBIC TUBE PLACEMENT N/A 7/22/2021    Procedure: INSERTION SUPRAPUBIC CATHETER PERCUTANEOUS;  Surgeon: Donn Montemayor MD;  Location: [facility];  Service: Urology       Family History     History reviewed  No pertinent family history      Social History     Social History     Social History     Tobacco Use   Smoking Status Never Smoker   Smokeless Tobacco Current User    Types: Chew       Past Surgical History:   Procedure Laterality Date    CATARACT EXTRACTION      HERNIA REPAIR      HIATAL HERNIA REPAIR      RI TRANSURETHRAL ELEC-SURG PROSTATECTOM N/A 7/22/2021    Procedure: TRANSURETHRAL RESECTION OF PROSTATE (TURP), cystoscopy;  Surgeon: Donn Montemayor MD;  Location: [facility];  Service: Urology    SHOULDER SURGERY Left     SUPRAPUBIC TUBE PLACEMENT N/A 7/22/2021    Procedure: INSERTION SUPRAPUBIC CATHETER PERCUTANEOUS;  Surgeon: Teo Sexton Robina Martinez MD;  Location: [facility];  Service: Urology         The following portions of the patient's history were reviewed and updated as appropriate: allergies, current medications, past family history, past medical history, past social history, past surgical history and problem list    Please note :  Voice dictation software has been used to create this document  There may be inadvertent transcription errors      [id number] [address] [place]

## 2021-08-23 ENCOUNTER — OFFICE VISIT (OUTPATIENT)
Dept: UROLOGY | Facility: CLINIC | Age: 81
End: 2021-08-23
Payer: COMMERCIAL

## 2021-08-23 VITALS
SYSTOLIC BLOOD PRESSURE: 124 MMHG | WEIGHT: 165 LBS | DIASTOLIC BLOOD PRESSURE: 80 MMHG | BODY MASS INDEX: 27.49 KG/M2 | HEIGHT: 65 IN

## 2021-08-23 DIAGNOSIS — N40.1 BENIGN PROSTATIC HYPERPLASIA WITH INCOMPLETE BLADDER EMPTYING: Primary | ICD-10-CM

## 2021-08-23 DIAGNOSIS — R39.14 BENIGN PROSTATIC HYPERPLASIA WITH INCOMPLETE BLADDER EMPTYING: Primary | ICD-10-CM

## 2021-08-23 PROCEDURE — 99024 POSTOP FOLLOW-UP VISIT: CPT | Performed by: NURSE PRACTITIONER

## 2021-08-23 PROCEDURE — 87086 URINE CULTURE/COLONY COUNT: CPT | Performed by: NURSE PRACTITIONER

## 2021-08-23 PROCEDURE — 51702 INSERT TEMP BLADDER CATH: CPT | Performed by: NURSE PRACTITIONER

## 2021-08-23 NOTE — ASSESSMENT & PLAN NOTE
·   Status post TURP 07/22/2021  ·  continues with high volume retention through suprapubic tube  ·  changed suprapubic tube today  ·  continue monthly suprapubic tube changes  ·  recommended keeping track of how much he is urinating versus how much they're emptying  from his tube  · Send urine for culture to make sure he is on the appropriate antibiotic  ·  follow-up in 6 months for recheck

## 2021-08-25 LAB — BACTERIA UR CULT: NORMAL

## 2021-08-26 ENCOUNTER — TELEPHONE (OUTPATIENT)
Dept: UROLOGY | Facility: CLINIC | Age: 81
End: 2021-08-26

## 2021-08-26 NOTE — TELEPHONE ENCOUNTER
----- Message from [address] [place] [address] sent at 8/26/2021  8:18 AM EDT -----  Please let patient know his urine culture did not show any signs of infection

## 2021-08-26 NOTE — TELEPHONE ENCOUNTER
Contacted and spoke with patient's son, Zana Bautista, to let him know the urine culture from 08/23/2021 shows no growth  Patient on Cipro and has one more day for completion  Zana Bautista is aware to contact the office if his father shows any signs of UTI

## 2021-09-27 ENCOUNTER — PROCEDURE VISIT (OUTPATIENT)
Dept: UROLOGY | Facility: CLINIC | Age: 81
End: 2021-09-27
Payer: COMMERCIAL

## 2021-09-27 ENCOUNTER — TELEPHONE (OUTPATIENT)
Dept: UROLOGY | Facility: CLINIC | Age: 81
End: 2021-09-27

## 2021-09-27 VITALS
HEART RATE: 64 BPM | BODY MASS INDEX: 28.46 KG/M2 | HEIGHT: 65 IN | DIASTOLIC BLOOD PRESSURE: 70 MMHG | SYSTOLIC BLOOD PRESSURE: 110 MMHG | WEIGHT: 170.8 LBS

## 2021-09-27 DIAGNOSIS — R39.14 BENIGN PROSTATIC HYPERPLASIA WITH INCOMPLETE BLADDER EMPTYING: Primary | ICD-10-CM

## 2021-09-27 DIAGNOSIS — N40.1 BENIGN PROSTATIC HYPERPLASIA WITH INCOMPLETE BLADDER EMPTYING: Primary | ICD-10-CM

## 2021-09-27 DIAGNOSIS — R33.9 URINARY RETENTION: Primary | ICD-10-CM

## 2021-09-27 PROCEDURE — 51705 CHANGE OF BLADDER TUBE: CPT

## 2021-09-27 RX ORDER — TAMSULOSIN HYDROCHLORIDE 0.4 MG/1
0.8 CAPSULE ORAL
Qty: 180 CAPSULE | Refills: 3 | Status: SHIPPED | OUTPATIENT
Start: 2021-09-27 | End: 2022-06-28 | Stop reason: SDUPTHER

## 2021-09-27 NOTE — TELEPHONE ENCOUNTER
Flomax order faxed to [address] in [facility]  Confirmation received  Call placed to son Con Pearson to make him aware that order was faxed  He verbalized understanding

## 2021-09-27 NOTE — TELEPHONE ENCOUNTER
I will sign a paper prescription  If you can please obtain the fax number to the [address] out of [place] we can fax the prescription to them    The pharmacy was not listed on his file

## 2021-09-27 NOTE — PROGRESS NOTES
9/27/2021    Gweneth Royal  1940  [id number]    Diagnosis: Urinary retention   Chief Complaint     SPT change          Patient presents for routine SPT change managed by Dr Akhil Kiran  Return to office in 1 month as scheduled for next SPT change     Procedure: Suprapubic Tube Change         Cystostomy tube change     Date/Time 9/27/2021 10:12 AM     Performed by  Steve Christianson RN     Authorized by Angel Solorzano MD      Universal Protocol   Consent: Verbal consent obtained  Risks and benefits: risks, benefits and alternatives were discussed  Consent given by: patient  Patient understanding: patient states understanding of the procedure being performed  Patient identity confirmed: verbally with patient        Local anesthesia used: no     Anesthesia   Local anesthesia used: no     Sedation   Patient sedated: no       Procedure Details   Patient tolerance: patient tolerated the procedure well with no immediate complications           Current catheter removed without difficulty after deflation of an intact balloon  Site prepped with Betadine, new 18F  latex spt change via aseptic technique without incident, 10 ml balloon inflated with sterile water  Irrigated easily for clear return, mild spasm noted  Patient tolerated well  Attached to catheter plug       Vitals:    09/27/21 0936   BP: 110/70   Pulse: 64   Weight: 77 5 kg (170 lb 12 8 oz)   Height: 5' 4 5" (1 638 m)         Steve Christianson RN

## 2021-10-26 ENCOUNTER — PROCEDURE VISIT (OUTPATIENT)
Dept: UROLOGY | Facility: CLINIC | Age: 81
End: 2021-10-26
Payer: COMMERCIAL

## 2021-10-26 VITALS
WEIGHT: 170.2 LBS | DIASTOLIC BLOOD PRESSURE: 70 MMHG | BODY MASS INDEX: 28.36 KG/M2 | SYSTOLIC BLOOD PRESSURE: 130 MMHG | HEART RATE: 70 BPM | HEIGHT: 65 IN

## 2021-10-26 DIAGNOSIS — R33.9 URINARY RETENTION: Primary | ICD-10-CM

## 2021-10-26 PROCEDURE — 51705 CHANGE OF BLADDER TUBE: CPT

## 2021-12-02 ENCOUNTER — PROCEDURE VISIT (OUTPATIENT)
Dept: UROLOGY | Facility: CLINIC | Age: 81
End: 2021-12-02
Payer: COMMERCIAL

## 2021-12-02 VITALS
BODY MASS INDEX: 30.06 KG/M2 | SYSTOLIC BLOOD PRESSURE: 130 MMHG | HEART RATE: 72 BPM | WEIGHT: 180.4 LBS | HEIGHT: 65 IN | DIASTOLIC BLOOD PRESSURE: 86 MMHG

## 2021-12-02 DIAGNOSIS — R33.9 URINARY RETENTION: Primary | ICD-10-CM

## 2021-12-02 DIAGNOSIS — N40.1 BENIGN PROSTATIC HYPERPLASIA WITH INCOMPLETE BLADDER EMPTYING: ICD-10-CM

## 2021-12-02 DIAGNOSIS — R39.14 BENIGN PROSTATIC HYPERPLASIA WITH INCOMPLETE BLADDER EMPTYING: ICD-10-CM

## 2021-12-02 PROCEDURE — 51705 CHANGE OF BLADDER TUBE: CPT | Performed by: UROLOGY

## 2022-01-19 ENCOUNTER — TELEPHONE (OUTPATIENT)
Dept: UROLOGY | Facility: AMBULATORY SURGERY CENTER | Age: 82
End: 2022-01-19

## 2022-01-19 NOTE — TELEPHONE ENCOUNTER
Called and spoke to patients noah Castro Course   Patient scheduled for SPT change in the [facility] [place] on 1/24/2022 @ 1:30 pm

## 2022-01-19 NOTE — TELEPHONE ENCOUNTER
Patient managed by Emilia Gallego is calling to reschedule nurse visit for spt tube change   Please call [phone]

## 2022-01-24 ENCOUNTER — PROCEDURE VISIT (OUTPATIENT)
Dept: UROLOGY | Facility: CLINIC | Age: 82
End: 2022-01-24
Payer: COMMERCIAL

## 2022-01-24 VITALS
BODY MASS INDEX: 31.39 KG/M2 | DIASTOLIC BLOOD PRESSURE: 64 MMHG | HEART RATE: 66 BPM | WEIGHT: 188.4 LBS | SYSTOLIC BLOOD PRESSURE: 126 MMHG | HEIGHT: 65 IN

## 2022-01-24 DIAGNOSIS — R33.9 URINARY RETENTION: Primary | ICD-10-CM

## 2022-01-24 PROCEDURE — 51705 CHANGE OF BLADDER TUBE: CPT

## 2022-01-24 NOTE — PROGRESS NOTES
I supervised the Advanced Practitioner  I reviewed the Advanced Practitioner note and agree      Jen Branch MD 01/24/22

## 2022-01-24 NOTE — PROGRESS NOTES
1/24/2022    Indira Dumont  1940  [id number]    Diagnosis: Urinary retention   Chief Complaint     SPT change          Patient presents for routine SPT change managed by Dr Valle Sessions  Follow up in 4 weeks for next SPT change    Procedure: Suprapubic Tube Change         Cystostomy tube change     Date/Time 1/24/2022 1:52 PM     Performed by  Kp Stoddard RN     Authorized by Ayaka Diaz MD      Universal Protocol   Consent: Verbal consent obtained  Risks and benefits: risks, benefits and alternatives were discussed  Consent given by: patient  Patient understanding: patient states understanding of the procedure being performed  Patient identity confirmed: verbally with patient        Local anesthesia used: no     Anesthesia   Local anesthesia used: no     Sedation   Patient sedated: no       Procedure Details   Patient tolerance: patient tolerated the procedure well with no immediate complications           Current catheter removed without difficulty after deflation of an intact balloon  Site prepped with Betadine, new 18F  latex spt change via aseptic technique without incident, 10 ml balloon inflated with sterile water  Irrigated easily for clear return, mild spasm noted  Patient tolerated well  Catheter plug attached       Vitals:    01/24/22 1328   BP: 126/64   Pulse: 66   Weight: 85 5 kg (188 lb 6 4 oz)   Height: 5' 4 5" (1 638 m)         Kp Stoddard RN

## 2022-02-21 ENCOUNTER — PROCEDURE VISIT (OUTPATIENT)
Dept: UROLOGY | Facility: CLINIC | Age: 82
End: 2022-02-21
Payer: COMMERCIAL

## 2022-02-21 VITALS
DIASTOLIC BLOOD PRESSURE: 64 MMHG | WEIGHT: 186.4 LBS | HEIGHT: 65 IN | SYSTOLIC BLOOD PRESSURE: 132 MMHG | BODY MASS INDEX: 31.06 KG/M2 | HEART RATE: 70 BPM

## 2022-02-21 DIAGNOSIS — R33.9 URINARY RETENTION: Primary | ICD-10-CM

## 2022-02-21 PROCEDURE — 51705 CHANGE OF BLADDER TUBE: CPT

## 2022-02-21 NOTE — PROGRESS NOTES
2/21/2022    [facility]  1940  [id number]    Diagnosis: Urinary retention, Chronic SPT   Chief Complaint     SPT change          Patient presents for routine SPT change managed by Dr Puja Montano  Follow up in 6 weeks for next SPT change    Procedure: Suprapubic Tube Change         Cystostomy tube change     Date/Time 2/21/2022 2:59 PM     Performed by  Sharlee Dakins, RN     Authorized by Nancy Davis MD      Universal Protocol   Consent: Verbal consent obtained  Risks and benefits: risks, benefits and alternatives were discussed  Consent given by: patient  Patient understanding: patient states understanding of the procedure being performed  Patient identity confirmed: verbally with patient        Local anesthesia used: no     Anesthesia   Local anesthesia used: no     Sedation   Patient sedated: no       Procedure Details   Patient tolerance: patient tolerated the procedure well with no immediate complications           Current catheter removed without difficulty after deflation of an intact balloon  Site prepped with Betadine, new 18F  latex spt change via aseptic technique without incident, 10 ml balloon inflated with sterile water  Irrigated easily for clear return, mild spasm noted  Patient tolerated well  Catheter plug attached     Vitals:    02/21/22 1358   BP: 132/64   Pulse: 70   Weight: 84 6 kg (186 lb 6 4 oz)   Height: 5' 4 5" (1 638 m)         Sharlee Dakins, RN

## 2022-02-22 NOTE — PROGRESS NOTES
I supervised the Advanced Practitioner  I reviewed the Advanced Practitioner note and agree      Rossy Merino MD 02/22/22

## 2022-04-05 ENCOUNTER — TELEPHONE (OUTPATIENT)
Dept: UROLOGY | Facility: CLINIC | Age: 82
End: 2022-04-05

## 2022-04-05 NOTE — TELEPHONE ENCOUNTER
Patient was a no show to today's SPT exchange appointment  Contacted patients son Tanya Thomas and left voicemail for him to please return call to schedule nurse visit in the [facility] office

## 2022-04-06 NOTE — TELEPHONE ENCOUNTER
2nd voicemail message left for patients son to please contact the office to scheduled SPT change for patient in [place]

## 2022-04-07 NOTE — TELEPHONE ENCOUNTER
3rd voicemail message left for patients son to please contact the office to schedule patient for next SPT change, as patient was a no show to his last catheter change appointment  Will have letter sent

## 2022-04-13 ENCOUNTER — HOSPITAL ENCOUNTER (EMERGENCY)
Facility: HOSPITAL | Age: 82
Discharge: HOME | End: 2022-04-14
Attending: EMERGENCY MEDICINE | Admitting: EMERGENCY MEDICINE
Payer: MEDICARE

## 2022-04-13 DIAGNOSIS — N50.811 RIGHT TESTICULAR PAIN: Primary | ICD-10-CM

## 2022-04-13 DIAGNOSIS — N39.0 UTI (URINARY TRACT INFECTION): ICD-10-CM

## 2022-04-13 LAB
ALBUMIN SERPL BCP-MCNC: 4.1 G/DL (ref 3.5–5)
ALP SERPL-CCNC: 69 U/L (ref 34–104)
ALT SERPL W P-5'-P-CCNC: 19 U/L (ref 7–52)
ANION GAP SERPL CALCULATED.3IONS-SCNC: 6 MMOL/L (ref 4–13)
AST SERPL W P-5'-P-CCNC: 17 U/L (ref 13–39)
BACTERIA UR QL AUTO: ABNORMAL /HPF
BASOPHILS # BLD AUTO: 0.07 THOUSANDS/ΜL (ref 0–0.1)
BASOPHILS NFR BLD AUTO: 1 % (ref 0–1)
BILIRUB SERPL-MCNC: 0.31 MG/DL (ref 0.2–1)
BILIRUB UR QL STRIP: NEGATIVE
BUN SERPL-MCNC: 42 MG/DL (ref 5–25)
CALCIUM SERPL-MCNC: 9.4 MG/DL (ref 8.4–10.2)
CHLORIDE SERPL-SCNC: 107 MMOL/L (ref 96–108)
CLARITY UR: ABNORMAL
CO2 SERPL-SCNC: 24 MMOL/L (ref 21–32)
COLOR UR: YELLOW
CREAT SERPL-MCNC: 1.81 MG/DL (ref 0.6–1.3)
EOSINOPHIL # BLD AUTO: 0.64 THOUSAND/ΜL (ref 0–0.61)
EOSINOPHIL NFR BLD AUTO: 7 % (ref 0–6)
ERYTHROCYTE [DISTWIDTH] IN BLOOD BY AUTOMATED COUNT: 12.9 % (ref 11.6–15.1)
GFR SERPL CREATININE-BSD FRML MDRD: 34 ML/MIN/1.73SQ M
GLUCOSE SERPL-MCNC: 91 MG/DL (ref 65–140)
GLUCOSE UR STRIP-MCNC: NEGATIVE MG/DL
HCT VFR BLD AUTO: 40.1 % (ref 36.5–49.3)
HGB BLD-MCNC: 13.3 G/DL (ref 12–17)
HGB UR QL STRIP.AUTO: ABNORMAL
IMM GRANULOCYTES # BLD AUTO: 0.03 THOUSAND/UL (ref 0–0.2)
IMM GRANULOCYTES NFR BLD AUTO: 0 % (ref 0–2)
KETONES UR STRIP-MCNC: NEGATIVE MG/DL
LACTATE SERPL-SCNC: 0.7 MMOL/L (ref 0.5–2)
LEUKOCYTE ESTERASE UR QL STRIP: ABNORMAL
LIPASE SERPL-CCNC: 65 U/L (ref 11–82)
LYMPHOCYTES # BLD AUTO: 2.92 THOUSANDS/ΜL (ref 0.6–4.47)
LYMPHOCYTES NFR BLD AUTO: 31 % (ref 14–44)
MCH RBC QN AUTO: 32 PG (ref 26.8–34.3)
MCHC RBC AUTO-ENTMCNC: 33.2 G/DL (ref 31.4–37.4)
MCV RBC AUTO: 96 FL (ref 82–98)
MONOCYTES # BLD AUTO: 0.73 THOUSAND/ΜL (ref 0.17–1.22)
MONOCYTES NFR BLD AUTO: 8 % (ref 4–12)
NEUTROPHILS # BLD AUTO: 5.13 THOUSANDS/ΜL (ref 1.85–7.62)
NEUTS SEG NFR BLD AUTO: 53 % (ref 43–75)
NITRITE UR QL STRIP: POSITIVE
NON-SQ EPI CELLS URNS QL MICRO: ABNORMAL /HPF
NRBC BLD AUTO-RTO: 0 /100 WBCS
PH UR STRIP.AUTO: 6.5 [PH]
PLATELET # BLD AUTO: 115 THOUSANDS/UL (ref 149–390)
PMV BLD AUTO: 9 FL (ref 8.9–12.7)
POTASSIUM SERPL-SCNC: 4 MMOL/L (ref 3.5–5.3)
PROT SERPL-MCNC: 7.1 G/DL (ref 6.4–8.4)
PROT UR STRIP-MCNC: ABNORMAL MG/DL
RBC # BLD AUTO: 4.16 MILLION/UL (ref 3.88–5.62)
RBC #/AREA URNS AUTO: ABNORMAL /HPF
SODIUM SERPL-SCNC: 137 MMOL/L (ref 135–147)
SP GR UR STRIP.AUTO: 1.02 (ref 1–1.03)
UROBILINOGEN UR QL STRIP.AUTO: 0.2 E.U./DL
WBC # BLD AUTO: 9.52 THOUSAND/UL (ref 4.31–10.16)
WBC #/AREA URNS AUTO: ABNORMAL /HPF

## 2022-04-13 PROCEDURE — 87086 URINE CULTURE/COLONY COUNT: CPT | Performed by: EMERGENCY MEDICINE

## 2022-04-13 PROCEDURE — 85025 COMPLETE CBC W/AUTO DIFF WBC: CPT | Performed by: EMERGENCY MEDICINE

## 2022-04-13 PROCEDURE — 83690 ASSAY OF LIPASE: CPT | Performed by: EMERGENCY MEDICINE

## 2022-04-13 PROCEDURE — 87077 CULTURE AEROBIC IDENTIFY: CPT | Performed by: EMERGENCY MEDICINE

## 2022-04-13 PROCEDURE — 81001 URINALYSIS AUTO W/SCOPE: CPT | Performed by: EMERGENCY MEDICINE

## 2022-04-13 PROCEDURE — 99284 EMERGENCY DEPT VISIT MOD MDM: CPT

## 2022-04-13 PROCEDURE — 80053 COMPREHEN METABOLIC PANEL: CPT | Performed by: EMERGENCY MEDICINE

## 2022-04-13 PROCEDURE — 83605 ASSAY OF LACTIC ACID: CPT | Performed by: EMERGENCY MEDICINE

## 2022-04-13 PROCEDURE — 87186 SC STD MICRODIL/AGAR DIL: CPT | Performed by: EMERGENCY MEDICINE

## 2022-04-13 PROCEDURE — 99284 EMERGENCY DEPT VISIT MOD MDM: CPT | Performed by: EMERGENCY MEDICINE

## 2022-04-13 PROCEDURE — 36415 COLL VENOUS BLD VENIPUNCTURE: CPT | Performed by: EMERGENCY MEDICINE

## 2022-04-14 ENCOUNTER — APPOINTMENT (EMERGENCY)
Dept: CT IMAGING | Facility: HOSPITAL | Age: 82
End: 2022-04-14
Payer: MEDICARE

## 2022-04-14 VITALS
SYSTOLIC BLOOD PRESSURE: 134 MMHG | WEIGHT: 190 LBS | HEART RATE: 81 BPM | DIASTOLIC BLOOD PRESSURE: 68 MMHG | OXYGEN SATURATION: 97 % | RESPIRATION RATE: 17 BRPM | BODY MASS INDEX: 32.11 KG/M2 | TEMPERATURE: 97.5 F

## 2022-04-14 PROCEDURE — G1004 CDSM NDSC: HCPCS

## 2022-04-14 PROCEDURE — 74177 CT ABD & PELVIS W/CONTRAST: CPT

## 2022-04-14 RX ORDER — CIPROFLOXACIN 500 MG/1
500 TABLET, FILM COATED ORAL ONCE
Status: COMPLETED | OUTPATIENT
Start: 2022-04-14 | End: 2022-04-14

## 2022-04-14 RX ORDER — CIPROFLOXACIN 500 MG/1
500 TABLET, FILM COATED ORAL 2 TIMES DAILY
Qty: 14 TABLET | Refills: 0 | Status: SHIPPED | OUTPATIENT
Start: 2022-04-14 | End: 2022-04-21

## 2022-04-14 RX ADMIN — IOHEXOL 100 ML: 350 INJECTION, SOLUTION INTRAVENOUS at 00:32

## 2022-04-14 RX ADMIN — CIPROFLOXACIN 500 MG: 500 TABLET, FILM COATED ORAL at 02:08

## 2022-04-14 NOTE — DISCHARGE INSTRUCTIONS
RETURN IF WORSE IN ANY WAY:   WORSENING PAIN,   FEVER OR FLU LIKE SYMPTOMS,   OR NEW AND CONCERNING SYMPTOMS SIGNS OR SYMPTOMS      PLEASE CALL YOUR UROLOGIST AND PRIMARY DOCTOR IN THE MORNING TO SET UP FOLLOW UP   PLEASE REVIEW THE WORK UP RESULTS WITH YOUR DOCTORS

## 2022-04-14 NOTE — ED PROVIDER NOTES
History  Chief Complaint   Patient presents with    Testicle Swelling     right side started earlier today       [de-identified]YEAR-OLD FEMALE    PATIENT IS HERE FOR RIGHT TESTICULAR PAIN  STATES THIS STARTED THIS MORNING, CAME ON GRADUALLY  PAIN IS NOW  RATED 1/10  DESCRIBED AS DULL      ASSOCIATED SYMPTOMS:  URINARY  SYMPTOMS: THERE IS NO DYSURIA, NO HEMATURIA, NO FREQUENCY  HE REPORTS NAUSEA, BUT DENIES ANY VOMITING  DENIES FEVERS, BUT DOES REPORT CHILLS        ALLEVIATING OR EXACERBATING FACTORS:  UNCERTAIN       INTERVENTIONS: NONE        History provided by:  Patient  Testicle Pain  Location:  RIGHT   Severity:  Mild  Chronicity:  New  Associated symptoms: no abdominal pain, no chest pain, no congestion, no cough, no diarrhea, no fatigue, no fever, no headaches, no myalgias, no nausea, no shortness of breath and no vomiting        Prior to Admission Medications   Prescriptions Last Dose Informant Patient Reported? Taking?    FLUoxetine (PROzac) 10 mg capsule  Family Member Yes No   Sig: TAKE 1 CAPSULE BY MOUTH EVERY MORNING FOR MOOD   HYDROcodone-acetaminophen (NORCO) 5-325 mg per tablet   No No   Sig: Take 1-2 tablets by mouth every 6 (six) hours as needed for pain for up to 5 dosesMax Daily Amount: 8 tablets   Patient not taking: Reported on 9/27/2021   Zoster Vac Recomb Adjuvanted 48 MCG/0 5ML SUSR  Family Member Yes No   Sig: INJECT 1 VIAL (0 5ML) INTRAMUSCULARLY ONCE **1ST DOSE**  GIVE FIRST DOSE NOW AND GIVE SECOND DOSE 2 TO 6 MONTHS AFTER INITIAL DOSE **1ST DOSE**  GIVE FIRST DOSE NOW AND GIVE SECOND DOSE 2 TO 6 MONTHS AFTER INITIAL DOSE   Patient not taking: Reported on 1/24/2022   cholecalciferol (VITAMIN D3) 1,000 units tablet   Yes No   Sig: Take 2,000 Units by mouth daily   finasteride (PROSCAR) 5 mg tablet  Family Member Yes No   Sig: TAKE ONE TABLET BY MOUTH DAILY FOR PROSTATE (BPH)   phenazopyridine (PYRIDIUM) 200 mg tablet   No No   Sig: Take 1 tablet (200 mg total) by mouth 3 (three) times a day as needed for bladder spasms   Patient not taking: Reported on 9/27/2021   tamsulosin (FLOMAX) 0 4 mg   No No   Sig: Take 2 capsules (0 8 mg total) by mouth daily with dinner      Facility-Administered Medications: None       Past Medical History:   Diagnosis Date    Hypertension     Urinary retention        Past Surgical History:   Procedure Laterality Date    CATARACT EXTRACTION      HERNIA REPAIR      HIATAL HERNIA REPAIR      MI TRANSURETHRAL ELEC-SURG PROSTATECTOM N/A 7/22/2021    Procedure: TRANSURETHRAL RESECTION OF PROSTATE (TURP), cystoscopy;  Surgeon: Dominic Martinez MD;  Location: [facility] OR;  Service: Urology    SHOULDER SURGERY Left     SUPRAPUBIC TUBE PLACEMENT N/A 7/22/2021    Procedure: INSERTION SUPRAPUBIC CATHETER PERCUTANEOUS;  Surgeon: Dominic Martinez MD;  Location: [facility] OR;  Service: Urology       History reviewed  No pertinent family history  I have reviewed and agree with the history as documented  E-Cigarette/Vaping    E-Cigarette Use Never User      E-Cigarette/Vaping Substances    Nicotine No     THC No     CBD No     Flavoring No     Other No     Unknown No      Social History     Tobacco Use    Smoking status: Never Smoker    Smokeless tobacco: Current User     Types: Chew   Vaping Use    Vaping Use: Never used   Substance Use Topics    Alcohol use: Never    Drug use: Never       Review of Systems   Constitutional: Negative for fatigue and fever  HENT: Negative for congestion  Respiratory: Negative for cough and shortness of breath  Cardiovascular: Negative for chest pain  Gastrointestinal: Negative for abdominal pain, diarrhea, nausea and vomiting  Genitourinary: Positive for testicular pain  Negative for dysuria and frequency  Musculoskeletal: Negative for myalgias, neck pain and neck stiffness  Neurological: Negative for headaches  All other systems reviewed and are negative        Physical Exam  Physical Exam  Constitutional:       General: He is not in acute distress  Appearance: He is well-developed  He is not ill-appearing, toxic-appearing or diaphoretic  HENT:      Head: Normocephalic and atraumatic  Right Ear: External ear normal       Left Ear: External ear normal       Nose: Nose normal       Mouth/Throat:      Pharynx: No oropharyngeal exudate  Eyes:      General: No scleral icterus  Right eye: No discharge  Left eye: No discharge  Extraocular Movements: Extraocular movements intact  Conjunctiva/sclera: Conjunctivae normal       Pupils: Pupils are equal, round, and reactive to light  Neck:      Vascular: No JVD  Trachea: No tracheal deviation  Cardiovascular:      Rate and Rhythm: Normal rate and regular rhythm  Pulses: Normal pulses  Heart sounds: Normal heart sounds  No murmur heard  No friction rub  No gallop  Pulmonary:      Effort: Pulmonary effort is normal  No respiratory distress  Breath sounds: Normal breath sounds  No stridor  No wheezing, rhonchi or rales  Chest:      Chest wall: No tenderness  Abdominal:      General: Bowel sounds are normal  There is no distension  Palpations: Abdomen is soft  There is no mass  Tenderness: There is no abdominal tenderness  There is no right CVA tenderness, left CVA tenderness, guarding or rebound  Hernia: No hernia is present  Genitourinary:     Penis: Normal        Testes: Normal    Musculoskeletal:         General: No swelling, deformity or signs of injury  Normal range of motion  Cervical back: Normal range of motion and neck supple  No rigidity or tenderness  Right lower leg: No edema  Left lower leg: No edema  Lymphadenopathy:      Cervical: No cervical adenopathy  Skin:     General: Skin is warm  Capillary Refill: Capillary refill takes less than 2 seconds  Coloration: Skin is not jaundiced or pale  Findings: No bruising, erythema, lesion or rash     Neurological:      General: No focal deficit present  Mental Status: He is alert and oriented to person, place, and time  Mental status is at baseline  Cranial Nerves: No cranial nerve deficit  Sensory: No sensory deficit  Motor: No weakness or abnormal muscle tone  Coordination: Coordination normal       Gait: Gait normal    Psychiatric:         Mood and Affect: Mood normal          Behavior: Behavior normal          Thought Content:  Thought content normal          Judgment: Judgment normal          Vital Signs  ED Triage Vitals [04/13/22 2300]   Temperature Pulse Respirations Blood Pressure SpO2   97 5 °F (36 4 °C) 87 20 141/72 96 %      Temp Source Heart Rate Source Patient Position - Orthostatic VS BP Location FiO2 (%)   Tympanic Monitor Sitting Left arm --      Pain Score       No Pain           Vitals:    04/13/22 2300 04/14/22 0237   BP: 141/72 134/68   Pulse: 87 81   Patient Position - Orthostatic VS: Sitting          Visual Acuity      ED Medications  Medications   iohexol (OMNIPAQUE) 350 MG/ML injection (SINGLE-DOSE) 100 mL (100 mL Intravenous Given 4/14/22 0032)   ciprofloxacin (CIPRO) tablet 500 mg (500 mg Oral Given 4/14/22 0208)       Diagnostic Studies  Results Reviewed     Procedure Component Value Units Date/Time    CMP [[id number]]  (Abnormal) Collected: 04/13/22 2320    Lab Status: Final result Specimen: Blood from Arm, Left Updated: 04/13/22 2352     Sodium 137 mmol/L      Potassium 4 0 mmol/L      Chloride 107 mmol/L      CO2 24 mmol/L      ANION GAP 6 mmol/L      BUN 42 mg/dL      Creatinine 1 81 mg/dL      Glucose 91 mg/dL      Calcium 9 4 mg/dL      AST 17 U/L      ALT 19 U/L      Alkaline Phosphatase 69 U/L      Total Protein 7 1 g/dL      Albumin 4 1 g/dL      Total Bilirubin 0 31 mg/dL      eGFR 34 ml/min/1 73sq m     Narrative:      [place] guidelines for Chronic Kidney Disease (CKD):     Stage 1 with normal or high GFR (GFR > 90 mL/min/1 73 square meters)    Stage 2 Mild CKD (GFR = 60-89 mL/min/1 73 square meters)    Stage 3A Moderate CKD (GFR = 45-59 mL/min/1 73 square meters)    Stage 3B Moderate CKD (GFR = 30-44 mL/min/1 73 square meters)    Stage 4 Severe CKD (GFR = 15-29 mL/min/1 73 square meters)    Stage 5 End Stage CKD (GFR <15 mL/min/1 73 square meters)  Note: GFR calculation is accurate only with a steady state creatinine    Lipase [[id number]]  (Normal) Collected: 04/13/22 2320    Lab Status: Final result Specimen: Blood from Arm, Left Updated: 04/13/22 2352     Lipase 65 u/L     Lactic acid, plasma [[id number]]  (Normal) Collected: 04/13/22 2320    Lab Status: Final result Specimen: Blood from Arm, Left Updated: 04/13/22 2352     LACTIC ACID 0 7 mmol/L     Narrative:      Result may be elevated if tourniquet was used during collection  Urine Microscopic [[id number]]  (Abnormal) Collected: 04/13/22 2319    Lab Status: Final result Specimen: Urine, Clean Catch Updated: 04/13/22 2342     RBC, UA 10-20 /hpf      WBC, UA Innumerable /hpf      Epithelial Cells Occasional /hpf      Bacteria, UA Moderate /hpf     Urine culture [[id number]] Collected: 04/13/22 2319    Lab Status:  In process Specimen: Urine, Clean Catch Updated: 04/13/22 2342    UA w Reflex to Microscopic w Reflex to Culture [[id number]]  (Abnormal) Collected: 04/13/22 2319    Lab Status: Final result Specimen: Urine, Clean Catch Updated: 04/13/22 2332     Color, UA Yellow     Clarity, UA Slightly Cloudy     Specific Gravity, UA 1 025     pH, UA 6 5     Leukocytes, UA 3+     Nitrite, UA Positive     Protein, UA 3+ mg/dl      Glucose, UA Negative mg/dl      Ketones, UA Negative mg/dl      Urobilinogen, UA 0 2 E U /dl      Bilirubin, UA Negative     Blood, UA 3+    CBC and differential [[id number]]  (Abnormal) Collected: 04/13/22 2320    Lab Status: Final result Specimen: Blood from Arm, Left Updated: 04/13/22 2331     WBC 9 52 Thousand/uL      RBC 4 16 Million/uL      Hemoglobin 13 3 g/dL      Hematocrit 40 1 %      MCV 96 fL      MCH 32 0 pg      MCHC 33 2 g/dL      RDW 12 9 %      MPV 9 0 fL      Platelets 376 Thousands/uL      nRBC 0 /100 WBCs      Neutrophils Relative 53 %      Immat GRANS % 0 %      Lymphocytes Relative 31 %      Monocytes Relative 8 %      Eosinophils Relative 7 %      Basophils Relative 1 %      Neutrophils Absolute 5 13 Thousands/µL      Immature Grans Absolute 0 03 Thousand/uL      Lymphocytes Absolute 2 92 Thousands/µL      Monocytes Absolute 0 73 Thousand/µL      Eosinophils Absolute 0 64 Thousand/µL      Basophils Absolute 0 07 Thousands/µL                  CT abdomen pelvis with contrast   Final Result by Emily Landaverde DO (04/14 1740)      Suprapubic catheter within the bladder lumen from an anterior approach  Moderate circumferential bladder wall thickening with inflammatory changes surrounding the bladder, suspicious for cystitis  Correlation with the patient's symptoms, laboratory    values, and urinalysis recommended  Renal cortical scarring, renal cysts bilaterally, colonic diverticulosis without evidence of acute diverticulitis, and other findings as above  Workstation performed: [id number]                    Procedures  Procedures         ED Course  ED Course as of 04/14/22 0334   u Apr 14, 2022   0009 LACTIC ACID: 0 7   0009 UA w Reflex to Microscopic w Reflex to Culture(!)   0010 CMP(!)   0010 CBC and differential(!)   0221 CT ABDOMEN AND PELVIS        GALLBLADDER:  No calcified gallstones  No pericholecystic inflammatory change      SPLEEN:  Unremarkable      PANCREAS:  Mild atrophy; otherwise grossly unremarkable     ADRENAL GLANDS:  Grossly unremarkable     KIDNEYS/URETERS:  Bilateral renal cortical scarring  Multiple bilateral renal cysts, largest on the right is exophytic and measures approximately 4 6 cm in size and largest on the left measures approximately 4 2 cm in size    Bilateral kidneys otherwise   appear grossly unremarkable; no hydroureteronephrosis      STOMACH AND BOWEL:  Scattered colonic diverticulosis  No discrete evidence of acute diverticulitis  No evidence of bowel obstruction  Otherwise grossly unremarkable      APPENDIX:  No findings to suggest appendicitis      ABDOMINOPELVIC CAVITY:  No ascites  No pneumoperitoneum  No lymphadenopathy      VESSELS:  Mild atherosclerosis; no aortic aneurysm      PELVIS     REPRODUCTIVE ORGANS:  Unremarkable for patient's age      URINARY BLADDER:  Suprapubic catheter within the bladder lumen from an anterior approach  Moderate circumferential bladder wall thickening with inflammatory changes surrounding the bladder, suspicious for cystitis  Correlation with the patient's   symptoms, laboratory values, and urinalysis recommended      ABDOMINAL WALL/INGUINAL REGIONS:  Small fat-containing bilateral inguinal hernias  Mild body wall edema      OSSEOUS STRUCTURES:  Multilevel degenerative changes of the spine  No acute fracture or destructive osseous lesion         IMPRESSION:     Suprapubic catheter within the bladder lumen from an anterior approach  Moderate circumferential bladder wall thickening with inflammatory changes surrounding the bladder, suspicious for cystitis  Correlation with the patient's symptoms, laboratory   values, and urinalysis recommended      Renal cortical scarring, renal cysts bilaterally, colonic diverticulosis without evidence of acute diverticulitis, and other findings as above     12 Pt looks great  I d/w pt and son at bedside the results    Pt feels much much better    he has no signs of sepsis, nor life or limb threatening process    I offered further tx, I offered admission, if she felt ill, or her pain was too great, but she declined  he is ready to manage from home    he is very appreciative of her ED care and is ready to manage from home    he understands return precautions    he will f/u w/ PCP and UROLOGY MDM    Disposition  Final diagnoses:   Right testicular pain   UTI (urinary tract infection)     Time reflects when diagnosis was documented in both MDM as applicable and the Disposition within this note     Time User Action Codes Description Comment    4/14/2022 12:11 AM Almaz Huizar [N50 [id number]] Right testicular pain     4/14/2022 12:11 AM Almaz Huizar [N39 0] UTI (urinary tract infection)       ED Disposition     ED Disposition Condition Date/Time Comment    Discharge Stable Thu Apr 14, 2022  2:27 AM Lee Roland discharge to home/self care              Follow-up Information     Follow up With Specialties Details Why Contact Info    Hiram Felton MD Family Medicine Call today  [address] [place] [address]      Lexy Carlton MD Urology Call today  [address] [place] [address]  [phone]      Your Urologist  Call today            Discharge Medication List as of 4/14/2022  2:28 AM      START taking these medications    Details   ciprofloxacin (CIPRO) 500 mg tablet Take 1 tablet (500 mg total) by mouth 2 (two) times a day for 7 days, Starting Thu 4/14/2022, Until u 4/21/2022, Normal         CONTINUE these medications which have NOT CHANGED    Details   cholecalciferol (VITAMIN D3) 1,000 units tablet Take 2,000 Units by mouth daily, Historical Med      finasteride (PROSCAR) 5 mg tablet TAKE ONE TABLET BY MOUTH DAILY FOR PROSTATE (BPH), Historical Med      FLUoxetine (PROzac) 10 mg capsule TAKE 1 CAPSULE BY MOUTH EVERY MORNING FOR MOOD, Historical Med      HYDROcodone-acetaminophen (NORCO) 5-325 mg per tablet Take 1-2 tablets by mouth every 6 (six) hours as needed for pain for up to 5 dosesMax Daily Amount: 8 tablets, Starting u 7/22/2021, Normal      phenazopyridine (PYRIDIUM) 200 mg tablet Take 1 tablet (200 mg total) by mouth 3 (three) times a day as needed for bladder spasms, Starting Thu 7/22/2021, Normal tamsulosin (FLOMAX) 0 4 mg Take 2 capsules (0 8 mg total) by mouth daily with dinner, Starting Mon 9/27/2021, Print      Zoster Vac Recomb Adjuvanted 50 MCG/0 5ML SUSR INJECT 1 VIAL (0 5ML) INTRAMUSCULARLY ONCE **1ST DOSE**  GIVE FIRST DOSE NOW AND GIVE SECOND DOSE 2 TO 6 MONTHS AFTER INITIAL DOSE **1ST DOSE**  GIVE FIRST DOSE NOW AND GIVE SECOND DOSE 2 TO 6 MONTHS AFTER INITIAL DOSE, Historical Med             No discharge procedures on file      PDMP Review       Value Time User    PDMP Reviewed  Yes 7/22/2021  1:52 PM Roberto Sutton MD          ED Provider  Electronically Signed by           David Mancia MD  04/14/22 5009

## 2022-04-14 NOTE — TELEPHONE ENCOUNTER
Called and spoke with patients noah Messer  Patient scheduled for SPT change in the [facility] office on 4/19/2022 @ 10:30 am  Patient will complete abx course as ordered by ER

## 2022-04-14 NOTE — TELEPHONE ENCOUNTER
Patient is followed up Kavya Sun  Patient would like to reschedule his missed nurses visit  Patient also states that he was the ED on 04/13/22 for a UTI  Patient was put on medication  They think it is due to missing his appointment       Patient would like a call back at [phone]

## 2022-04-16 LAB
BACTERIA UR CULT: ABNORMAL
BACTERIA UR CULT: ABNORMAL

## 2022-04-19 ENCOUNTER — PROCEDURE VISIT (OUTPATIENT)
Dept: UROLOGY | Facility: CLINIC | Age: 82
End: 2022-04-19
Payer: MEDICARE

## 2022-04-19 VITALS
DIASTOLIC BLOOD PRESSURE: 78 MMHG | BODY MASS INDEX: 31.65 KG/M2 | WEIGHT: 190 LBS | HEART RATE: 80 BPM | HEIGHT: 65 IN | SYSTOLIC BLOOD PRESSURE: 134 MMHG

## 2022-04-19 DIAGNOSIS — R33.9 URINARY RETENTION: Primary | ICD-10-CM

## 2022-04-19 PROCEDURE — 51705 CHANGE OF BLADDER TUBE: CPT

## 2022-04-19 NOTE — PROGRESS NOTES
4/19/2022    Vannesa Davis  1940  [id number]    Diagnosis: Urinary retention   Chief Complaint     SPT change          Patient presents for routine SPT change managed by Dr Hernan Mercedes  Follow up in 6 weeks for next SPT change     Procedure: Suprapubic Tube Change         Cystostomy tube change     Date/Time 4/19/2022 10:44 AM     Performed by  Toy Morse RN     Authorized by Donnie Richards MD      [facility] Protocol   Consent: Verbal consent obtained  Risks and benefits: risks, benefits and alternatives were discussed  Consent given by: patient  Patient understanding: patient states understanding of the procedure being performed  Patient identity confirmed: verbally with patient        Local anesthesia used: no     Anesthesia   Local anesthesia used: no     Sedation   Patient sedated: no        Specimen: no    Culture: no   Procedure Details   Patient tolerance: patient tolerated the procedure well with no immediate complications           Current catheter removed without difficulty after deflation of an intact balloon  Site prepped with Betadine, new 18F  latex spt change via aseptic technique without incident, 10 ml balloon inflated with sterile water  Irrigated easily for straw-yellow return, mild spasm noted  Patient tolerated well  Catheter plug attached       Vitals:    04/19/22 1022   BP: 134/78   Pulse: 80   Weight: 86 2 kg (190 lb)   Height: 5' 4 5" (1 638 m)         Toy Morse RN

## 2022-04-19 NOTE — PROGRESS NOTES
I supervised the Advanced Practitioner  I reviewed the Advanced Practitioner note and agree      Don King MD 04/19/22

## 2022-05-31 ENCOUNTER — PROCEDURE VISIT (OUTPATIENT)
Dept: UROLOGY | Facility: CLINIC | Age: 82
End: 2022-05-31
Payer: MEDICARE

## 2022-05-31 VITALS
DIASTOLIC BLOOD PRESSURE: 72 MMHG | WEIGHT: 184.6 LBS | HEIGHT: 65 IN | BODY MASS INDEX: 30.75 KG/M2 | HEART RATE: 84 BPM | SYSTOLIC BLOOD PRESSURE: 130 MMHG

## 2022-05-31 DIAGNOSIS — R33.9 URINARY RETENTION: Primary | ICD-10-CM

## 2022-05-31 PROCEDURE — 51705 CHANGE OF BLADDER TUBE: CPT

## 2022-05-31 NOTE — PROGRESS NOTES
5/31/2022    Brian Varela  1940  [id number]    Diagnosis: urinary retention  Chief Complaint     SPT change          Patient presents for routine SPT exchange managed by Dr Gann Seen  Follow up in 1 month with Lucien Neff  Will also have next SPT exchange at that appointment    Procedure: Suprapubic Tube Change         Cystostomy tube change     Date/Time 5/31/2022 11:02 AM     Performed by  Yokasta Hamilton RN     Authorized by Shreya Perez MD      Universal Protocol   Consent: Verbal consent obtained  Risks and benefits: risks, benefits and alternatives were discussed  Consent given by: patient  Patient understanding: patient states understanding of the procedure being performed  Patient identity confirmed: verbally with patient        Local anesthesia used: no     Anesthesia   Local anesthesia used: no     Sedation   Patient sedated: no        Specimen: no    Culture: no   Procedure Details   Patient tolerance: patient tolerated the procedure well with no immediate complications                Current catheter removed without difficulty after deflation of an intact balloon  Site prepped with Betadine, new 18F  latex spt change via aseptic technique without incident, 10 ml balloon inflated with sterile water  Irrigated easily for tika return, mild spasm noted  Patient tolerated well  Catheter plug attached       Vitals:    05/31/22 1026   BP: 130/72   Pulse: 84   Weight: 83 7 kg (184 lb 9 6 oz)   Height: 5' 4 5" (1 638 m)         Yokasta Hamilton RN

## 2022-05-31 NOTE — PROGRESS NOTES
I supervised the Advanced Practitioner  I reviewed the Advanced Practitioner note and agree      Yusuf Aldana MD 05/31/22

## 2022-06-28 ENCOUNTER — OFFICE VISIT (OUTPATIENT)
Dept: UROLOGY | Facility: CLINIC | Age: 82
End: 2022-06-28
Payer: COMMERCIAL

## 2022-06-28 VITALS
SYSTOLIC BLOOD PRESSURE: 120 MMHG | HEIGHT: 65 IN | WEIGHT: 185 LBS | HEART RATE: 73 BPM | BODY MASS INDEX: 30.82 KG/M2 | DIASTOLIC BLOOD PRESSURE: 70 MMHG

## 2022-06-28 DIAGNOSIS — N40.1 BENIGN PROSTATIC HYPERPLASIA WITH INCOMPLETE BLADDER EMPTYING: ICD-10-CM

## 2022-06-28 DIAGNOSIS — R39.14 BENIGN PROSTATIC HYPERPLASIA WITH INCOMPLETE BLADDER EMPTYING: ICD-10-CM

## 2022-06-28 PROCEDURE — 99213 OFFICE O/P EST LOW 20 MIN: CPT

## 2022-06-28 RX ORDER — TAMSULOSIN HYDROCHLORIDE 0.4 MG/1
0.8 CAPSULE ORAL
Qty: 180 CAPSULE | Refills: 3 | Status: SHIPPED | OUTPATIENT
Start: 2022-06-28

## 2022-06-28 RX ORDER — FINASTERIDE 5 MG/1
5 TABLET, FILM COATED ORAL DAILY
Qty: 90 TABLET | Refills: 2 | Status: SHIPPED | OUTPATIENT
Start: 2022-06-28

## 2022-06-28 NOTE — PROGRESS NOTES
6/28/2022    Mac Bayron  1940  [id number]    Diagnosis: BPH with incomplete bladder emptying   Chief Complaint     Benign Prostatic Hypertrophy          Patient presents for routine SPT change/AP follow up managed by Dr Sandi Jeronimo  Follow up in 5 weeks for next SPT change  Patient to bring log of PVR's along to appointment for review to determine if SPT can be removed     Procedure: Suprapubic Tube Change         Cystostomy tube change     Date/Time 6/28/2022 10:03 AM     Performed by  Unruly Sanders RN     Authorized by DOLORES Ahn      Universal Protocol   Consent: Verbal consent obtained  Risks and benefits: risks, benefits and alternatives were discussed  Consent given by: patient  Patient understanding: patient states understanding of the procedure being performed  Patient identity confirmed: verbally with patient        Local anesthesia used: no     Anesthesia   Local anesthesia used: no     Sedation   Patient sedated: no        Specimen: no    Culture: no   Procedure Details   Patient tolerance: patient tolerated the procedure well with no immediate complications           Current catheter removed without difficulty after deflation of an intact balloon  Site prepped with Betadine, new 18F  latex spt change via aseptic technique without incident, 10 ml balloon inflated with sterile water  Irrigated easily for blood-tinged return, mild spasm noted  Patient tolerated well  Catheter plug attached       Vitals:    06/28/22 0916   BP: 120/70   Pulse: 73   Weight: 83 9 kg (185 lb)   Height: 5' 4 5" (1 638 m)         Unruly Sanders RN

## 2022-06-28 NOTE — PROGRESS NOTES
6/28/2022    Chief Complaint   Patient presents with    Benign Prostatic Hypertrophy       Assessment and Plan    81 y o  male     1  BPH with obstruction  · S/p TURP 07/22/2021  · Currently managed with SPT  · Exchanged today   · Continue tamsulosin and finasteride   · Continue monthly SPT exchanges    Discussion:    Patient and son were instructed to keep a accurate voiding diary with PVR measurements over the next month until his next SPT change  1 week prior to his scheduled SPT they will send the records to our office for review  If the patient is able to consistently urinate through the penis and has < 100 mL PVR left over then we will remove his SPT  Follow-up pending removal of SPT      History of Present Illness  Edu Castrejon is a 81 y o  male her for follow-up evaluation for BPH with obstruction  He was initially seen on 06/14/2021 for bilateral hydronephrosis  Secondary to BPH with obstruction and urinary retention  He was noted to have 2 L of retention and JENNIFER  He failed medical management  He underwent a transurethral resection of the prostate as well as suprapubic tube placement on 07/22/2021  During his last office visit he was instructed to urinate through the urethra  If he was unable to urinate he was to unplug the suprapubic tube and empty the urine and measure the amount  He was also instructed to measure postvoid residual by opening the suprapubic tube  If he is consistently urinating through the penis and has < 100 mL left over will remove the suprapubic tube  He presents today with his son for follow-up and routine SPT exchange  He reports that he has not been tracking his PVR  He states that he has been voiding without much difficulty  He will uncap his SPT once per day at random times  He reports a good urinary stream  He was able to urinate in the office today and his SPT was drained for approximately 65 cc of clear yellow urine   He denies any fever, chills, gross heamturia, abdominal pain, or flank pain  SPT exchanged by RN in office today  Review of Systems   Constitutional: Negative for chills and fever  HENT: Negative for congestion and sore throat  Respiratory: Negative for cough and shortness of breath  Cardiovascular: Negative for chest pain and leg swelling  Gastrointestinal: Negative for abdominal pain, constipation, diarrhea, nausea and vomiting  Genitourinary: Negative for decreased urine volume, difficulty urinating, dysuria, flank pain, frequency, hematuria, testicular pain and urgency  SPT   Musculoskeletal: Negative for back pain and gait problem  Skin: Negative for wound  Allergic/Immunologic: Negative for immunocompromised state  Neurological: Negative for dizziness, weakness, light-headedness, numbness and headaches  Hematological: Does not bruise/bleed easily  Vitals  Vitals:    06/28/22 0916   BP: 120/70   Pulse: 73   Weight: 83 9 kg (185 lb)   Height: 5' 4 5" (1 638 m)       Physical Exam  Vitals reviewed  Constitutional:       General: He is not in acute distress  Appearance: Normal appearance  He is not ill-appearing or toxic-appearing  HENT:      Head: Normocephalic and atraumatic  Eyes:      General: No scleral icterus  Conjunctiva/sclera: Conjunctivae normal    Cardiovascular:      Rate and Rhythm: Normal rate  Pulmonary:      Effort: Pulmonary effort is normal  No respiratory distress  Abdominal:      General: Abdomen is flat  Palpations: Abdomen is soft  Tenderness: There is no abdominal tenderness  There is no right CVA tenderness or left CVA tenderness  Hernia: No hernia is present  Comments: SPT site clean dry without redness, swelling, or drainage  Genitourinary:     Penis: Normal        Testes: Normal    Musculoskeletal:      Cervical back: Normal range of motion  Right lower leg: No edema  Left lower leg: No edema     Skin:     General: Skin is warm and dry       Coloration: Skin is not jaundiced or pale  Neurological:      General: No focal deficit present  Mental Status: He is alert and oriented to person, place, and time  Mental status is at baseline  Gait: Gait normal    Psychiatric:         Mood and Affect: Mood normal          Behavior: Behavior normal          Thought Content: Thought content normal          Judgment: Judgment normal          Past History  Past Medical History:   Diagnosis Date    Hypertension     Urinary retention      Social History     Socioeconomic History    Marital status:      Spouse name: None    Number of children: None    Years of education: None    Highest education level: None   Occupational History    None   Tobacco Use    Smoking status: Never Smoker    Smokeless tobacco: Current User     Types: Chew   Vaping Use    Vaping Use: Never used   Substance and Sexual Activity    Alcohol use: Never    Drug use: Never    Sexual activity: None   Other Topics Concern    None   Social History Narrative    None     Social Determinants of Health     Financial Resource Strain: Not on file   Food Insecurity: Not on file   Transportation Needs: Not on file   Physical Activity: Not on file   Stress: Not on file   Social Connections: Not on file   Intimate Partner Violence: Not on file   Housing Stability: Not on file     Social History     Tobacco Use   Smoking Status Never Smoker   Smokeless Tobacco Current User    Types: Chew     History reviewed  No pertinent family history      The following portions of the patient's history were reviewed and updated as appropriate allergies, current medications, past medical history, past social history, past surgical history and problem list    Imaging:    Results  No results found for this or any previous visit (from the past 1 hour(s)) ]  Lab Results   Component Value Date    PSA 1 7 08/16/2021     Lab Results   Component Value Date    CALCIUM 9 4 04/13/2022    K 4 0 04/13/2022    CO2 24 04/13/2022     04/13/2022    BUN 42 (H) 04/13/2022    CREATININE 1 81 (H) 04/13/2022     Lab Results   Component Value Date    WBC 9 52 04/13/2022    HGB 13 3 04/13/2022    HCT 40 1 04/13/2022    MCV 96 04/13/2022     (L) 04/13/2022       Please Note:  Voice dictation software has been used to create this document  There may be inadvertent transcriptions errors       Lashonda Gtz

## 2022-08-04 ENCOUNTER — PROCEDURE VISIT (OUTPATIENT)
Dept: UROLOGY | Facility: CLINIC | Age: 82
End: 2022-08-04
Payer: COMMERCIAL

## 2022-08-04 VITALS
BODY MASS INDEX: 31.58 KG/M2 | HEART RATE: 80 BPM | WEIGHT: 185 LBS | HEIGHT: 64 IN | DIASTOLIC BLOOD PRESSURE: 68 MMHG | SYSTOLIC BLOOD PRESSURE: 132 MMHG

## 2022-08-04 DIAGNOSIS — R33.9 URINARY RETENTION: ICD-10-CM

## 2022-08-04 DIAGNOSIS — N40.1 BENIGN PROSTATIC HYPERPLASIA WITH INCOMPLETE BLADDER EMPTYING: Primary | ICD-10-CM

## 2022-08-04 DIAGNOSIS — R39.14 BENIGN PROSTATIC HYPERPLASIA WITH INCOMPLETE BLADDER EMPTYING: Primary | ICD-10-CM

## 2022-08-04 DIAGNOSIS — N13.8 BPH WITH OBSTRUCTION/LOWER URINARY TRACT SYMPTOMS: ICD-10-CM

## 2022-08-04 DIAGNOSIS — N40.1 BPH WITH OBSTRUCTION/LOWER URINARY TRACT SYMPTOMS: ICD-10-CM

## 2022-08-04 PROCEDURE — 51705 CHANGE OF BLADDER TUBE: CPT

## 2022-08-04 NOTE — PROGRESS NOTES
I supervised the Advanced Practitioner  I reviewed the Advanced Practitioner note and agree      Jen Branch MD 08/04/22

## 2022-08-04 NOTE — PROGRESS NOTES
8/4/2022    My Alexander  1940  [id number]    Diagnosis: Urinary retention, BPH   Chief Complaint     SPT change          Patient presents for routine SPT change managed by Dr Shavon Park  Follow up in 5 weeks for next SPT change  Continue to log SPT residuals- patient reports residuals are usually about 300cc or more after urinating through penis  Per CRNP- patient needs to be urinating adequately through penis and have SPT residuals consistently < 100  Procedure: Suprapubic Tube Change         Cystostomy tube change     Date/Time 8/4/2022 10:53 AM     Performed by  Scottie Rosen RN     Authorized by Rossy Merino MD      [place] Protocol   Consent: Verbal consent obtained  Risks and benefits: risks, benefits and alternatives were discussed  Consent given by: patient  Patient understanding: patient states understanding of the procedure being performed  Patient identity confirmed: verbally with patient        Local anesthesia used: no     Anesthesia   Local anesthesia used: no     Sedation   Patient sedated: no        Specimen: no    Culture: no   Procedure Details   Patient tolerance: patient tolerated the procedure well with no immediate complications           Current catheter removed without difficulty after deflation of an intact balloon  Site prepped with Betadine, new 18F  latex spt change via aseptic technique without incident, 10 ml balloon inflated with sterile water  Irrigated easily for clear return, mild spasm noted  Patient tolerated well  Catheter plug attached       Vitals:    08/04/22 1032   BP: 132/68   Pulse: 80   Weight: 83 9 kg (185 lb)   Height: 5' 4" (1 626 m)         Scottie Rosen RN

## 2022-09-08 NOTE — ADDENDUM NOTE
Addended by: Li Herrera on: 8/16/2021 01:08 PM     Modules accepted: Orders   Patient educated on surgical scrub, COVID testing, preadmission instructions, medical clearance and day of procedure medications, verbalizes understanding. Pt instructed to stop vitamins/supplements/herbal medications/ASA/NSAIDS for one week prior to surgery and discuss with PMD.    CAPRINI SCORE    AGE RELATED RISK FACTORS                                                             [ ] Age 41-60 years                                            (1 Point)  [ ] Age: 61-74 years                                           (2 Points)                 [ ] Age= 75 years                                                (3 Points)             DISEASE RELATED RISK FACTORS                                                       [ ] Edema in the lower extremities                 (1 Point)                     [ ] Varicose veins                                               (1 Point)                                 [ ] BMI > 25 Kg/m2                                            (1 Point)                                  [ ] Serious infection (ie PNA)                            (1 Point)                     [ ] Lung disease ( COPD, Emphysema)            (1 Point)                                                                          [ ] Acute myocardial infarction                         (1 Point)                  [ ] Congestive heart failure (in the previous month)  (1 Point)         [ ] Inflammatory bowel disease                            (1 Point)                  [ ] Central venous access, PICC or Port               (2 points)       (within the last month)                                                                [ ] Stroke (in the previous month)                        (5 Points)    [ ] Previous or present malignancy                       (2 points)                                                                                                                                                         HEMATOLOGY RELATED FACTORS                                                         [ ] Prior episodes of VTE                                     (3 Points)                     [ ] Positive family history for VTE                      (3 Points)                  [ ] Prothrombin 01220 A                                     (3 Points)                     [ ] Factor V Leiden                                                (3 Points)                        [ ] Lupus anticoagulants                                      (3 Points)                                                           [ ] Anticardiolipin antibodies                              (3 Points)                                                       [ ] High homocysteine in the blood                   (3 Points)                                             [ ] Other congenital or acquired thrombophilia      (3 Points)                                                [ ] Heparin induced thrombocytopenia                  (3 Points)                                        MOBILITY RELATED FACTORS  [ ] Bed rest                                                         (1 Point)  [ ] Plaster cast                                                    (2 points)  [ ] Bed bound for more than 72 hours           (2 Points)    GENDER SPECIFIC FACTORS  [ ] Pregnancy or had a baby within the last month   (1 Point)  [ ] Post-partum < 6 weeks                                   (1 Point)  [ ] Hormonal therapy  or oral contraception   (1 Point)  [ ] History of pregnancy complications              (1 point)  [ ] Unexplained or recurrent              (1 Point)    OTHER RISK FACTORS                                           (1 Point)  [ ] BMI >40, smoking, diabetes requiring insulin, chemotherapy  blood transfusions and length of surgery over 2 hours    SURGERY RELATED RISK FACTORS  [ ]  Section within the last month     (1 Point)  [ ] Minor surgery                                                  (1 Point)  [ ] Arthroscopic surgery                                       (2 Points)  [ ] Planned major surgery lasting more            (2 Points)      than 45 minutes     [ ] Elective hip or knee joint replacement       (5 points)       surgery                                                TRAUMA RELATED RISK FACTORS  [ ] Fracture of the hip, pelvis, or leg                       (5 Points)  [ ] Spinal cord injury resulting in paralysis             (5 points)       (in the previous month)    [ ] Paralysis  (less than 1 month)                             (5 Points)  [ ] Multiple Trauma within 1 month                        (5 Points)    Total Score [        ]    Caprini Score 0-2: Low Risk, NO VTE prophylaxis required for most patients, encourage ambulation  Caprini Score 3-6: Moderate Risk , pharmacologic VTE prophylaxis is indicated for most patients (in the absence of contraindications)  Caprini Score Greater than or =7: High risk, pharmocologic VTE prophylaxis indicated for most patients (in the absence of contraindications)    OPIOID RISK TOOL    KRYSTIAN EACH BOX THAT APPLIES AND ADD TOTALS AT THE END    FAMILY HISTORY OF SUBSTANCE ABUSE                 FEMALE         MALE                                                Alcohol                             [  ]1 pt          [  ]3pts                                               Illegal Durgs                     [  ]2 pts        [  ]3pts                                               Rx Drugs                           [  ]4 pts        [  ]4 pts    PERSONAL HISTORY OF SUBSTANCE ABUSE                                                                                          Alcohol                             [  ]3 pts       [  ]3 pts                                               Illegal Drugs                     [  ]4 pts        [  ]4 pts                                               Rx Drugs                           [  ]5 pts        [  ]5 pts    AGE BETWEEN 16-45 YEARS                                      [  ]1 pt         [  ]1 pt    HISTORY OF PREADOLESCENT   SEXUAL ABUSE                                                             [  ]3 pts        [  ]0pts    PSYCHOLOGICAL DISEASE                     ADD, OCD, Bipolar, Schizophrenia        [  ]2 pts         [  ]2 pts                      Depression                                               [  ]1 pt           [  ]1 pt           SCORING TOTAL   (add numbers and type here)              (***)                                     A score of 3 or lower indicated LOW risk for future opioid abuse  A score of 4 to 7 indicated moderate risk for future opioid abuse  A score of 8 or higher indicates a high risk for opioid abuse   50 y/o female  LMP 2022 with PMH hypothyroidism presents to PST with complaints of having abnormal uterine bleeding for past 4 months. States she had an IUD put in on 2022 to try and control bleeding, even with IUD in place she had vaginal bleeding daily. Reports menorrhagia and dysmenorrhea. She states she has had intermittent pelvic cramping. Denies fever, chills, nausea or vomiting. Patient is scheduled for dilation and curettage, hysteroscopy on 22 with Dr. Proctor. Patient educated on surgical scrub, COVID testing, preadmission instructions and day of procedure medications, verbalizes understanding. Pt instructed to stop vitamins/supplements/herbal medications/ASA/NSAIDS for one week prior to surgery and discuss with PMD.    CAPRINI SCORE    AGE RELATED RISK FACTORS                                                             [x ] Age 41-60 years                                            (1 Point)  [ ] Age: 61-74 years                                           (2 Points)                 [ ] Age= 75 years                                                (3 Points)             DISEASE RELATED RISK FACTORS                                                       [ ] Edema in the lower extremities                 (1 Point)                     [x ] Varicose veins                                               (1 Point)                                 [x ] BMI > 25 Kg/m2                                            (1 Point)                                  [ ] Serious infection (ie PNA)                            (1 Point)                     [ ] Lung disease ( COPD, Emphysema)            (1 Point)                                                                          [ ] Acute myocardial infarction                         (1 Point)                  [ ] Congestive heart failure (in the previous month)  (1 Point)         [ ] Inflammatory bowel disease                            (1 Point)                  [ ] Central venous access, PICC or Port               (2 points)       (within the last month)                                                                [ ] Stroke (in the previous month)                        (5 Points)    [ ] Previous or present malignancy                       (2 points)                                                                                                                                                         HEMATOLOGY RELATED FACTORS                                                         [ ] Prior episodes of VTE                                     (3 Points)                     [ ] Positive family history for VTE                      (3 Points)                  [ ] Prothrombin 83001 A                                     (3 Points)                     [ ] Factor V Leiden                                                (3 Points)                        [ ] Lupus anticoagulants                                      (3 Points)                                                           [ ] Anticardiolipin antibodies                              (3 Points)                                                       [ ] High homocysteine in the blood                   (3 Points)                                             [ ] Other congenital or acquired thrombophilia      (3 Points)                                                [ ] Heparin induced thrombocytopenia                  (3 Points)                                        MOBILITY RELATED FACTORS  [ ] Bed rest                                                         (1 Point)  [ ] Plaster cast                                                    (2 points)  [ ] Bed bound for more than 72 hours           (2 Points)    GENDER SPECIFIC FACTORS  [ ] Pregnancy or had a baby within the last month   (1 Point)  [ ] Post-partum < 6 weeks                                   (1 Point)  [ ] Hormonal therapy  or oral contraception   (1 Point)  [ ] History of pregnancy complications              (1 point)  [ ] Unexplained or recurrent              (1 Point)    OTHER RISK FACTORS                                           (1 Point)  [ ] BMI >40, smoking, diabetes requiring insulin, chemotherapy  blood transfusions and length of surgery over 2 hours    SURGERY RELATED RISK FACTORS  [ ]  Section within the last month     (1 Point)  [ x] Minor surgery                                                  (1 Point)  [ ] Arthroscopic surgery                                       (2 Points)  [ ] Planned major surgery lasting more            (2 Points)      than 45 minutes     [ ] Elective hip or knee joint replacement       (5 points)       surgery                                                TRAUMA RELATED RISK FACTORS  [ ] Fracture of the hip, pelvis, or leg                       (5 Points)  [ ] Spinal cord injury resulting in paralysis             (5 points)       (in the previous month)    [ ] Paralysis  (less than 1 month)                             (5 Points)  [ ] Multiple Trauma within 1 month                        (5 Points)    Total Score [     4   ]    Caprini Score 0-2: Low Risk, NO VTE prophylaxis required for most patients, encourage ambulation  Caprini Score 3-6: Moderate Risk , pharmacologic VTE prophylaxis is indicated for most patients (in the absence of contraindications)  Caprini Score Greater than or =7: High risk, pharmocologic VTE prophylaxis indicated for most patients (in the absence of contraindications)    OPIOID RISK TOOL    KRYSTIAN EACH BOX THAT APPLIES AND ADD TOTALS AT THE END    FAMILY HISTORY OF SUBSTANCE ABUSE                 FEMALE         MALE                                                Alcohol                             [  ]1 pt          [  ]3pts                                               Illegal Durgs                     [  ]2 pts        [  ]3pts                                               Rx Drugs                           [  ]4 pts        [  ]4 pts    PERSONAL HISTORY OF SUBSTANCE ABUSE                                                                                          Alcohol                             [  ]3 pts       [  ]3 pts                                               Illegal Drugs                     [  ]4 pts        [  ]4 pts                                               Rx Drugs                           [  ]5 pts        [  ]5 pts    AGE BETWEEN 16-45 YEARS                                      [  ]1 pt         [  ]1 pt    HISTORY OF PREADOLESCENT   SEXUAL ABUSE                                                             [  ]3 pts        [  ]0pts    PSYCHOLOGICAL DISEASE                     ADD, OCD, Bipolar, Schizophrenia        [ x ]2 pts         [  ]2 pts                      Depression                                               [  ]1 pt           [  ]1 pt           SCORING TOTAL   (add numbers and type here)              (*2**)                                     A score of 3 or lower indicated LOW risk for future opioid abuse  A score of 4 to 7 indicated moderate risk for future opioid abuse  A score of 8 or higher indicates a high risk for opioid abuse

## 2022-09-15 ENCOUNTER — PROCEDURE VISIT (OUTPATIENT)
Dept: UROLOGY | Facility: CLINIC | Age: 82
End: 2022-09-15
Payer: COMMERCIAL

## 2022-09-15 VITALS
HEIGHT: 65 IN | HEART RATE: 75 BPM | BODY MASS INDEX: 30.42 KG/M2 | DIASTOLIC BLOOD PRESSURE: 64 MMHG | WEIGHT: 182.6 LBS | SYSTOLIC BLOOD PRESSURE: 126 MMHG

## 2022-09-15 DIAGNOSIS — N40.1 BPH WITH OBSTRUCTION/LOWER URINARY TRACT SYMPTOMS: ICD-10-CM

## 2022-09-15 DIAGNOSIS — R39.14 BENIGN PROSTATIC HYPERPLASIA WITH INCOMPLETE BLADDER EMPTYING: Primary | ICD-10-CM

## 2022-09-15 DIAGNOSIS — N13.8 BPH WITH OBSTRUCTION/LOWER URINARY TRACT SYMPTOMS: ICD-10-CM

## 2022-09-15 DIAGNOSIS — R33.9 URINARY RETENTION: ICD-10-CM

## 2022-09-15 DIAGNOSIS — N40.1 BENIGN PROSTATIC HYPERPLASIA WITH INCOMPLETE BLADDER EMPTYING: Primary | ICD-10-CM

## 2022-09-15 PROCEDURE — 51705 CHANGE OF BLADDER TUBE: CPT

## 2022-09-15 NOTE — PROGRESS NOTES
I supervised the Advanced Practitioner  I reviewed the Advanced Practitioner note and agree      Pepe Anders MD 09/15/22

## 2022-09-15 NOTE — PROGRESS NOTES
9/15/2022    [facility]  1940  [id number]    Diagnosis: BPH with incomplete bladder emptying   Chief Complaint     SPT change          Patient presents for routine SPT change managed by Dr Puja Montano  Follow up in 5 weeks for next SPT change    Patient states he is keeping a log of residuals from SPT post voiding normally through penis  States it is still quite a bit and he forgot to bring log along with him today for review  Advised him to bring it along with him last time  Procedure: Suprapubic Tube Change         Cystostomy tube change     Date/Time 9/15/2022 11:21 AM     Performed by  Sharlee Dakins, RN     Authorized by Nancy Davis MD      Universal Protocol   Consent: Verbal consent obtained  Risks and benefits: risks, benefits and alternatives were discussed  Consent given by: patient  Patient understanding: patient states understanding of the procedure being performed  Patient identity confirmed: verbally with patient        Local anesthesia used: no     Anesthesia   Local anesthesia used: no     Sedation   Patient sedated: no        Specimen: no    Culture: no   Procedure Details   Patient tolerance: patient tolerated the procedure well with no immediate complications           Current catheter removed without difficulty after deflation of an intact balloon  Site prepped with Betadine, new 18F  latex spt change via aseptic technique without incident, 10 ml balloon inflated with sterile water  Irrigated easily for clear return, mild spasm noted  Patient tolerated well  Catheter plug attached       Vitals:    09/15/22 1050   BP: 126/64   Pulse: 75   Weight: 82 8 kg (182 lb 9 6 oz)   Height: 5' 4 5" (1 638 m)         Sharlee Dakins, RN

## 2022-10-17 ENCOUNTER — TELEPHONE (OUTPATIENT)
Dept: UROLOGY | Facility: AMBULATORY SURGERY CENTER | Age: 82
End: 2022-10-17

## 2022-10-17 NOTE — TELEPHONE ENCOUNTER
Called patient's son to reschedule his father's appointment  Son currently hospitalized    Patient rescheduled to 10/26/2022 at 230 pm at the [facility] office

## 2022-10-17 NOTE — TELEPHONE ENCOUNTER
Pt's son called and wants to reschedule the patient's appointment       Pt's son can be reached at  [phone]

## 2022-10-26 ENCOUNTER — PROCEDURE VISIT (OUTPATIENT)
Dept: UROLOGY | Facility: CLINIC | Age: 82
End: 2022-10-26
Payer: COMMERCIAL

## 2022-10-26 VITALS
DIASTOLIC BLOOD PRESSURE: 72 MMHG | HEIGHT: 65 IN | WEIGHT: 183 LBS | BODY MASS INDEX: 30.49 KG/M2 | HEART RATE: 80 BPM | SYSTOLIC BLOOD PRESSURE: 120 MMHG

## 2022-10-26 DIAGNOSIS — R33.9 URINARY RETENTION: Primary | ICD-10-CM

## 2022-10-26 DIAGNOSIS — N40.1 BPH WITH OBSTRUCTION/LOWER URINARY TRACT SYMPTOMS: ICD-10-CM

## 2022-10-26 DIAGNOSIS — N13.8 BPH WITH OBSTRUCTION/LOWER URINARY TRACT SYMPTOMS: ICD-10-CM

## 2022-10-26 PROCEDURE — 51705 CHANGE OF BLADDER TUBE: CPT

## 2022-10-26 NOTE — PROGRESS NOTES
10/26/2022    Alvaro Herrera  1940  [id number]    Diagnosis: Urinary retention, BPH with obstruction   Chief Complaint     SPT change          Patient presents for routine SPT exchange managed by Dr Ge Frederick  Follow up in 4-5 weeks for next SPT change    Procedure: Suprapubic Tube Change         Cystostomy tube change     Date/Time 10/26/2022 2:50 PM     Performed by  Bernadette Lamb RN     Authorized by Rey Good MD      [place] Protocol   Consent: Verbal consent obtained  Risks and benefits: risks, benefits and alternatives were discussed  Consent given by: patient  Patient understanding: patient states understanding of the procedure being performed  Patient identity confirmed: verbally with patient        Local anesthesia used: no     Anesthesia   Local anesthesia used: no     Sedation   Patient sedated: no        Specimen: no    Culture: no   Procedure Details   Patient tolerance: patient tolerated the procedure well with no immediate complications           Current catheter removed without difficulty after deflation of an intact balloon  Site prepped with Betadine, new 18F  latex spt change via aseptic technique without incident, 10 ml balloon inflated with sterile water  Irrigated easily for straw-yellow return, mild spasm noted  Patient tolerated well  Catheter plug attached  Patient reports he is keeping track of PVR's at home, but they are still elevated after he urinates normally  He forgot to bring paper with numbers along today  Advised him to continue keeping a record and bring it along to next appointment       Vitals:    10/26/22 1424   BP: 120/72   Pulse: 80   Weight: 83 kg (183 lb)   Height: 5' 4 5" (1 638 m)         Bernadette Lamb

## 2022-10-26 NOTE — PROGRESS NOTES
I supervised the Advanced Practitioner  I reviewed the Advanced Practitioner note and agree      Jason Anna MD 10/26/22

## 2022-11-29 ENCOUNTER — PROCEDURE VISIT (OUTPATIENT)
Dept: UROLOGY | Facility: CLINIC | Age: 82
End: 2022-11-29

## 2022-11-29 VITALS
HEIGHT: 65 IN | BODY MASS INDEX: 30.37 KG/M2 | WEIGHT: 182.3 LBS | DIASTOLIC BLOOD PRESSURE: 78 MMHG | SYSTOLIC BLOOD PRESSURE: 128 MMHG | HEART RATE: 70 BPM

## 2022-11-29 DIAGNOSIS — N40.1 BPH WITH OBSTRUCTION/LOWER URINARY TRACT SYMPTOMS: ICD-10-CM

## 2022-11-29 DIAGNOSIS — R33.9 URINARY RETENTION: Primary | ICD-10-CM

## 2022-11-29 DIAGNOSIS — N13.8 BPH WITH OBSTRUCTION/LOWER URINARY TRACT SYMPTOMS: ICD-10-CM

## 2022-11-29 NOTE — PROGRESS NOTES
11/29/2022    Radha Devi  1940  [id number]    Diagnosis: Urinary retention, BPH with obstruction   Chief Complaint    SPT change         Patient presents for routine SPT exchange managed by Dr Amanda Perdomo  Follow up in 4 weeks for next SPT change    Continue tracking SPT residuals post normal urination- PVR's not under 100 mL at this time    Procedure: Suprapubic Tube Change         Cystostomy tube change     Date/Time 11/29/2022 11:19 AM     Performed by  Irene Lora RN     Authorized by Ana Bates MD      Universal Protocol   Consent: Verbal consent obtained  Risks and benefits: risks, benefits and alternatives were discussed  Consent given by: patient  Patient understanding: patient states understanding of the procedure being performed  Patient identity confirmed: verbally with patient        Local anesthesia used: no     Anesthesia   Local anesthesia used: no     Sedation   Patient sedated: no        Specimen: no    Culture: no   Procedure Details   Patient tolerance: patient tolerated the procedure well with no immediate complications           Current catheter removed without difficulty after deflation of an intact balloon  Site prepped with Betadine, new 18F  latex spt change via aseptic technique without incident, 10 ml balloon inflated with sterile water  Irrigated easily for straw-yellow return, mild spasm noted  Patient tolerated well  Catheter plug attached       Vitals:    11/29/22 1102   BP: 128/78   Pulse: 70   Weight: 82 7 kg (182 lb 4 8 oz)   Height: 5' 4 5" (1 638 m)         Irene Lora RN

## 2022-11-29 NOTE — PROGRESS NOTES
I supervised the Advanced Practitioner  I reviewed the Advanced Practitioner note and agree      Rolemiguel Scott MD 11/29/22

## 2022-12-29 ENCOUNTER — PROCEDURE VISIT (OUTPATIENT)
Dept: UROLOGY | Facility: CLINIC | Age: 82
End: 2022-12-29

## 2022-12-29 VITALS
HEART RATE: 76 BPM | SYSTOLIC BLOOD PRESSURE: 126 MMHG | WEIGHT: 183.2 LBS | DIASTOLIC BLOOD PRESSURE: 80 MMHG | HEIGHT: 65 IN | BODY MASS INDEX: 30.52 KG/M2

## 2022-12-29 DIAGNOSIS — N40.1 BPH WITH OBSTRUCTION/LOWER URINARY TRACT SYMPTOMS: ICD-10-CM

## 2022-12-29 DIAGNOSIS — R33.9 URINARY RETENTION: Primary | ICD-10-CM

## 2022-12-29 DIAGNOSIS — N13.8 BPH WITH OBSTRUCTION/LOWER URINARY TRACT SYMPTOMS: ICD-10-CM

## 2022-12-29 NOTE — PROGRESS NOTES
I supervised the Advanced Practitioner  I reviewed the Advanced Practitioner note and agree      Alexander Sosa MD 12/29/22

## 2022-12-29 NOTE — PROGRESS NOTES
12/29/2022    Madelaine Sauceda  1940  [id number]    Diagnosis: Urinary retention, BPH with LUTS  Chief Complaint    SPT change         Patient presents for routine SPT change managed by Dr Umair Camarena  Follow up in 4 weeks for next SPT change    Procedure: Suprapubic Tube Change         Cystostomy tube change     Date/Time 12/29/2022 9:52 AM     Performed by  Bernadette Quezada RN     Authorized by Kamaljit Montana MD      Universal Protocol   Consent: Verbal consent obtained  Risks and benefits: risks, benefits and alternatives were discussed  Consent given by: patient  Patient understanding: patient states understanding of the procedure being performed  Patient identity confirmed: verbally with patient        Local anesthesia used: no     Anesthesia   Local anesthesia used: no     Sedation   Patient sedated: no        Specimen: no    Culture: no   Procedure Details   Patient tolerance: patient tolerated the procedure well with no immediate complications           Current catheter removed without difficulty after deflation of an intact balloon  Site prepped with Betadine, new 18F  latex spt change via aseptic technique without incident, 10 ml balloon inflated with sterile water  Irrigated easily for straw-yellow return, mild spasm noted  Patient tolerated well  Bladder drained and catheter plug attached       Vitals:    12/29/22 0930   BP: 126/80   Pulse: 76   Weight: 83 1 kg (183 lb 3 2 oz)   Height: 5' 4 5" (1 638 m)         Bernadette Quezada RN

## 2023-01-27 ENCOUNTER — PROCEDURE VISIT (OUTPATIENT)
Dept: UROLOGY | Facility: CLINIC | Age: 83
End: 2023-01-27

## 2023-01-27 VITALS
WEIGHT: 184.3 LBS | HEART RATE: 70 BPM | HEIGHT: 65 IN | DIASTOLIC BLOOD PRESSURE: 76 MMHG | BODY MASS INDEX: 30.71 KG/M2 | SYSTOLIC BLOOD PRESSURE: 140 MMHG

## 2023-01-27 DIAGNOSIS — R33.9 URINARY RETENTION: Primary | ICD-10-CM

## 2023-01-27 DIAGNOSIS — N13.8 BPH WITH OBSTRUCTION/LOWER URINARY TRACT SYMPTOMS: ICD-10-CM

## 2023-01-27 DIAGNOSIS — N40.1 BPH WITH OBSTRUCTION/LOWER URINARY TRACT SYMPTOMS: ICD-10-CM

## 2023-01-27 NOTE — PROGRESS NOTES
I supervised the Advanced Practitioner  I reviewed the Advanced Practitioner note and agree      Michael Haro MD 01/27/23

## 2023-01-27 NOTE — PROGRESS NOTES
1/27/2023    Abby Khoury  1940  [id number]    Diagnosis: Urinary retention, BPH with obstruction   Chief Complaint    SPT change         Patient presents for routine SPT change managed by Dr Lilibeth Hester  Follow up in 4 weeks for next SPT change and for visit with AP     Procedure: Suprapubic Tube Change         Cystostomy tube change     Date/Time 1/27/2023 10:55 AM     Performed by  Vinay Perdomo RN     Authorized by Giovani Rodriguez MD      Universal Protocol   Consent: Verbal consent obtained  Risks and benefits: risks, benefits and alternatives were discussed  Consent given by: patient  Patient understanding: patient states understanding of the procedure being performed  Patient identity confirmed: verbally with patient        Local anesthesia used: no     Anesthesia   Local anesthesia used: no     Sedation   Patient sedated: no        Specimen: no    Culture: no   Procedure Details   Patient tolerance: patient tolerated the procedure well with no immediate complications           Current catheter removed without difficulty after deflation of an intact balloon  Site prepped with Betadine, new 18F  suprapubic spt change via aseptic technique without incident, 10 ml balloon inflated with sterile water  irrigated easily for blood-tinged return, moderate spasm noted  Patient tolerated with difficulty  Catheter plug attached  Son reports patient seems to have pain and moans when trying to urinate through the urethra with SPT plugged  Attempted to ask patient questions regarding the pain he is experiencing, however he is very vague with answers and is a poor historian  Patient reports his urinary stream is normal when he does go via urethra, however he is not going large amounts and still has residuals >100 mL via SPT  Does not feel as though he has a stricture and stream is normal  He did moan in pain when SPT was removed and again when re-inserted   There was a small amount of sediment on the end of the tubing, however tubing was removed and replaced without difficulty  Patient does not report s/s of infection  Son reports patient is stubborn and doesn't like to admit when he is in pain or discuss things in detail  Son requested an appointment with DOLORES to further discuss in hopes patient will discuss issues in further detail       Vitals:    01/27/23 1031   BP: 140/76   Pulse: 70   Weight: 83 6 kg (184 lb 4 8 oz)   Height: 5' 4 5" (1 638 m)         Shirin Morales RN

## 2023-02-15 ENCOUNTER — TELEPHONE (OUTPATIENT)
Dept: UROLOGY | Facility: MEDICAL CENTER | Age: 83
End: 2023-02-15

## 2023-02-15 NOTE — TELEPHONE ENCOUNTER
Called and left voicemail message for patients son Corey Mosqueda to return call to re-schedule patients nurse visit for SPT change and follow up with Dwain Silva  Wish to coordinate appointments

## 2023-02-16 NOTE — TELEPHONE ENCOUNTER
Called and spoke with patients noah Muniz to re-schedule patients follow up with Lucas Renae and nurse visit for SPT change  Patient re-scheduled to 2/22/23 @ 3:00 pm in [place]

## 2023-02-22 ENCOUNTER — OFFICE VISIT (OUTPATIENT)
Dept: UROLOGY | Facility: CLINIC | Age: 83
End: 2023-02-22

## 2023-02-22 VITALS
WEIGHT: 178 LBS | BODY MASS INDEX: 30.08 KG/M2 | SYSTOLIC BLOOD PRESSURE: 136 MMHG | HEART RATE: 72 BPM | DIASTOLIC BLOOD PRESSURE: 88 MMHG

## 2023-02-22 DIAGNOSIS — N13.8 BPH WITH OBSTRUCTION/LOWER URINARY TRACT SYMPTOMS: Primary | ICD-10-CM

## 2023-02-22 DIAGNOSIS — R10.30 LOWER ABDOMINAL PAIN: ICD-10-CM

## 2023-02-22 DIAGNOSIS — N32.89 BLADDER SPASM: ICD-10-CM

## 2023-02-22 DIAGNOSIS — N40.1 BPH WITH OBSTRUCTION/LOWER URINARY TRACT SYMPTOMS: Primary | ICD-10-CM

## 2023-02-22 RX ORDER — OXYBUTYNIN CHLORIDE 5 MG/1
5 TABLET ORAL 2 TIMES DAILY PRN
Qty: 20 TABLET | Refills: 1 | Status: SHIPPED | OUTPATIENT
Start: 2023-02-22

## 2023-02-22 NOTE — PROGRESS NOTES
2/22/2023    No chief complaint on file  Assessment and Plan    81 y o  male manage by Dr Hiram Hernandez    1  BPH with obstruction  · S/p TURP 7/22/2021  · Currently manage with SPT and continues to monitor his PVR which until recently have been in excess of 300 mL  Over the past couple of weeks he reports little residual output thought  · SPT changed today and replaced with silicone catheter  There was some resistance with removal of the old SPT however no injury or trauma occurred  · Follow-up with Dr Hiram Hernandez in 3 months to reassess PVR's and discuss possible removal of SPT  2  Abdominal pain  · This has been ongoing for several months and report intermittent sharp pain  He is a poor historian and it is difficulty to differentiate if this is possibly a bladder spasm from his SPT versus dysuria  · During routine exchanged of his SPT he did experience significant spasms  SPT was switched to a silicone catheter to see if this helps with some of his discomfort  him to a silicone catheter today with his SPT change to see if this helps  I will also check a CT abdomen and pelvis without contrast  We will try a short trial of oxybutynin 5 mg BID as needed for spasms  Subjective:        He presents today with his son  He reports over the past several months he has been having some bladder pain and pressure as well as discomfort when urinating  It is difficult for him to differentiate if this is a bladder spasm or dysuria and reports he will get this pain/discomfort with and without urination  He also reports over the past several weeks he has noticed a significant reduction in his PVR when emptying his SPT  On average in the past he has had volume in excess of 300 and more recently he reports very little residuals  History of Present Illness  Jalen Zapata is a 81 y o  male here for follow-up evaluation of BPH with obstruction      He was initially seen on 06/14/2021 for bilateral hydronephrosis secondary to BPH with obstruction and urinary retention  He was noted to have 2 L of retention and JENNIFER  He failed medical management  Ned Smith underwent a transurethral resection of the prostate as well as suprapubic tube placement on 07/22/2021  He was instructed to urinate through the urethra   If he was unable to urinate he was to unplug the suprapubic tube and empty the urine and measure the amount   He was also instructed to measure postvoid residual by opening the suprapubic tube   If he is consistently urinating through the penis and has < 100 mL left over will remove the suprapubic tube  At his last office visit with me on 6/28/2022 he reported that he had not been tracking his PVRs and had been voiding without much difficulty  He had been uncapping his suprapubic tube once per day at random times  He did feel like he had a good urinary stream   He was again instructed to measure his PVRs and if they would be consistently less than 100 mL we would remove the suprapubic tube  Cystostomy tube change     Date/Time 2/22/2023 4:09 PM     Performed by  DOLORES Bone     Authorized by DOLORES Bone      Universal Protocol   Consent: Verbal consent obtained  Consent given by: patient  Timeout called at: 2/22/2023 4:09 PM   Patient understanding: patient states understanding of the procedure being performed  Patient consent: the patient's understanding of the procedure matches consent given  Patient identity confirmed: verbally with patient        Local anesthesia used: yes     Anesthesia   Local anesthesia used: yes  Local Anesthetic: LET (lido, epi, tetracaine)     Procedure Details   Procedure Notes: Current catheter removed with moderate difficulty to due resistance after deflation of an intact balloon  There were no signs of injury, trauma, or bleeding   Site prepped with Betadine, new 63A silicone suprapubic spt change via aseptic technique without incident, 10 ml balloon inflated with sterile water  irrigated easily for straw-yellow return, moderate spasm noted  Patient tolerated well  SPT capped following procedure  Patient tolerance: patient tolerated the procedure well with no immediate complications                 DOLORES Swenson            Review of Systems   Constitutional: Negative for chills and fever  HENT: Negative for congestion and sore throat  Respiratory: Negative for cough and shortness of breath  Cardiovascular: Negative for chest pain and leg swelling  Gastrointestinal: Positive for abdominal pain  Negative for constipation, diarrhea, nausea and vomiting  Genitourinary: Negative for difficulty urinating, dysuria, flank pain, frequency, hematuria, scrotal swelling, testicular pain and urgency  SPT   Musculoskeletal: Negative for back pain and gait problem  Skin: Negative for wound  Allergic/Immunologic: Negative for immunocompromised state  Neurological: Negative for dizziness, weakness and numbness  Hematological: Does not bruise/bleed easily  Vitals  Vitals:    02/22/23 1500   BP: 136/88   Pulse: 72   Weight: 80 7 kg (178 lb)       Physical Exam  Vitals reviewed  Constitutional:       General: He is not in acute distress  Appearance: Normal appearance  He is not ill-appearing or toxic-appearing  HENT:      Head: Normocephalic and atraumatic  Eyes:      General: No scleral icterus  Conjunctiva/sclera: Conjunctivae normal    Cardiovascular:      Rate and Rhythm: Normal rate  Pulmonary:      Effort: Pulmonary effort is normal  No respiratory distress  Abdominal:      Palpations: Abdomen is soft  Tenderness: There is no abdominal tenderness  There is no right CVA tenderness or left CVA tenderness  Hernia: No hernia is present  Genitourinary:     Comments: SPT capped clean dry tract  Musculoskeletal:      Cervical back: Normal range of motion  Right lower leg: No edema        Left lower leg: No edema  Skin:     General: Skin is warm and dry  Coloration: Skin is not jaundiced or pale  Neurological:      General: No focal deficit present  Mental Status: He is alert and oriented to person, place, and time  Mental status is at baseline  Gait: Gait normal    Psychiatric:         Mood and Affect: Mood normal          Behavior: Behavior normal          Thought Content: Thought content normal          Judgment: Judgment normal            Past History  Past Medical History:   Diagnosis Date   • Hypertension    • Urinary retention      Social History     Socioeconomic History   • Marital status:      Spouse name: None   • Number of children: None   • Years of education: None   • Highest education level: None   Occupational History   • None   Tobacco Use   • Smoking status: Never   • Smokeless tobacco: Current     Types: Chew   Vaping Use   • Vaping Use: Never used   Substance and Sexual Activity   • Alcohol use: Never   • Drug use: Never   • Sexual activity: Not Currently   Other Topics Concern   • None   Social History Narrative   • None     Social Determinants of Health     Financial Resource Strain: Not on file   Food Insecurity: Not on file   Transportation Needs: Not on file   Physical Activity: Not on file   Stress: Not on file   Social Connections: Not on file   Intimate Partner Violence: Not on file   Housing Stability: Not on file     Social History     Tobacco Use   Smoking Status Never   Smokeless Tobacco Current   • Types: Chew     History reviewed  No pertinent family history      The following portions of the patient's history were reviewed and updated as appropriate allergies, current medications, past medical history, past social history, past surgical history and problem list    Imaging:    Results  No results found for this or any previous visit (from the past 1 hour(s)) ]  Lab Results   Component Value Date    PSA 1 7 08/16/2021     Lab Results   Component Value Date    CALCIUM 9 4 04/13/2022    K 4 0 04/13/2022    CO2 24 04/13/2022     04/13/2022    BUN 42 (H) 04/13/2022    CREATININE 1 81 (H) 04/13/2022     Lab Results   Component Value Date    WBC 9 52 04/13/2022    HGB 13 3 04/13/2022    HCT 40 1 04/13/2022    MCV 96 04/13/2022     (L) 04/13/2022       Please Note:  Voice dictation software has been used to create this document  There may be inadvertent transcriptions errors       Iola Cockayne, CRNP 02/22/23

## 2023-02-28 ENCOUNTER — NURSE TRIAGE (OUTPATIENT)
Dept: OTHER | Facility: OTHER | Age: 83
End: 2023-02-28

## 2023-02-28 ENCOUNTER — PROCEDURE VISIT (OUTPATIENT)
Dept: UROLOGY | Facility: CLINIC | Age: 83
End: 2023-02-28

## 2023-02-28 VITALS — HEIGHT: 65 IN | BODY MASS INDEX: 29.99 KG/M2 | WEIGHT: 180 LBS

## 2023-02-28 DIAGNOSIS — N13.8 BPH WITH OBSTRUCTION/LOWER URINARY TRACT SYMPTOMS: Primary | ICD-10-CM

## 2023-02-28 DIAGNOSIS — N40.1 BPH WITH OBSTRUCTION/LOWER URINARY TRACT SYMPTOMS: Primary | ICD-10-CM

## 2023-02-28 DIAGNOSIS — R33.9 URINARY RETENTION: ICD-10-CM

## 2023-02-28 NOTE — TELEPHONE ENCOUNTER
Regarding: Broken catheter  ----- Message from Snow Gonzalez RN sent at 2/28/2023  9:30 AM EST -----  "The cap on his catheter keeps coming off and urine is spilling everywhere "

## 2023-02-28 NOTE — PROGRESS NOTES
2/28/2023  Ivelisse Jones is a 81 y o  male  [id number]    Diagnosis: Urinary retention, BPH     Chief Complaint    SPT check         Patient presents for SPT check  Son contacted the office stating patient is having lot of leakage of urine out of end of the catheter and feels plug is not working  Patient is managed by Dr Katey Taylor  Assessment:    Upon assessment, patient did not have catheter plug all the way into draining end of catheter  Patient instructed on how to properly fit plug into silicone catheter and was able to demonstrate appropriate technique  No leakage noted  Patient provided with extra stat lock and 2 extra plugs  Advised to contact the office with any further issues  Vitals:    02/28/23 1229   Weight: 81 6 kg (180 lb)   Height: 5' 4 5" (1 638 m)       Plan: Follow up as scheduled for next routine SPT change     No results found for this or any previous visit (from the past 4 hour(s))          Scar Elias RN

## 2023-02-28 NOTE — PROGRESS NOTES
I supervised the Advanced Practitioner  I reviewed the Advanced Practitioner note and agree      Yamilex Silva MD 02/28/23

## 2023-02-28 NOTE — TELEPHONE ENCOUNTER
Called and spoke with patients son  He is not sure if catheter plug is faulty or if patient is just not pressing plug far enough into end of silicone catheter tubing  Patient having a lot of urinary leakage and son requesting catheter be checked/patient be provided with a new plug  SPT draining without difficulty  Patient scheduled for nurse visit today at 1:00 pm in [place] to further assess

## 2023-02-28 NOTE — TELEPHONE ENCOUNTER
Patient with catheter that was placed on Wednesday  Son states that the cap on catheter keeps coming off, urine is getting on patient and causing a mess  States this has been ongoing for the past few days  Son would like a call back as soon as possible to have patient seen in office today  Please follow up  Reason for Disposition  • [1] Catheter is broken AND [2] is not usable    Answer Assessment - Initial Assessment Questions  1  SYMPTOMS: "What symptoms are you concerned about?"      Silicone catheter, cap keeps coming off and spilling everywhere    2  ONSET:  "When did the symptoms start? "      3-4 days ago    3  FEVER: "Is there a fever?" If Yes, ask: "What is the temperature, how was it measured, and when did it start?"      Denies    4  ABDOMINAL PAIN: "Is there any abdominal pain?" (e g , Scale 1-10; or mild, moderate, severe)      Denies    5  URINE COLOR: "What color is the urine?"  "Is there blood present in the urine?" (e g , clear, yellow, cloudy, tea-colored, blood streaks, bright red)      Clear and yellow    6  ONSET: "When was the catheter inserted?"      Wednesday     7  OTHER SYMPTOMS: "Do you have any other symptoms?" (e g , back pain, bad urine odor)       Denies    8   PREGNANCY: "Is there any chance you are pregnant?" "When was your last menstrual period?"      N/A    Protocols used: URINARY CATHETER SYMPTOMS AND QUESTIONS-ADULT-[facility]

## 2023-03-14 ENCOUNTER — PROCEDURE VISIT (OUTPATIENT)
Dept: UROLOGY | Facility: CLINIC | Age: 83
End: 2023-03-14

## 2023-03-14 VITALS
HEART RATE: 74 BPM | DIASTOLIC BLOOD PRESSURE: 78 MMHG | SYSTOLIC BLOOD PRESSURE: 136 MMHG | BODY MASS INDEX: 29.99 KG/M2 | HEIGHT: 65 IN | WEIGHT: 180 LBS

## 2023-03-14 DIAGNOSIS — N13.8 BPH WITH OBSTRUCTION/LOWER URINARY TRACT SYMPTOMS: ICD-10-CM

## 2023-03-14 DIAGNOSIS — N40.1 BPH WITH OBSTRUCTION/LOWER URINARY TRACT SYMPTOMS: ICD-10-CM

## 2023-03-14 DIAGNOSIS — R33.9 URINARY RETENTION: Primary | ICD-10-CM

## 2023-03-14 NOTE — PROGRESS NOTES
3/14/2023    Dereje Beyer  1940  [id number]    Diagnosis: Urinary retention, BPH with obstruction   Chief Complaint    SPT change         Patient presents for routine SPT change managed by Dr Dominic Gamez  Follow up in 3 weeks for next SPT change     Patient encouraged to track residuals via SPT     Procedure: Suprapubic Tube Change         Cystostomy tube change     Date/Time 3/14/2023 2:18 PM     Performed by  Lena Machado RN     Authorized by Alexander Sosa MD      Universal Protocol   Consent: Verbal consent obtained  Risks and benefits: risks, benefits and alternatives were discussed  Consent given by: patient  Patient understanding: patient states understanding of the procedure being performed  Patient identity confirmed: verbally with patient        Local anesthesia used: no     Anesthesia   Local anesthesia used: no     Sedation   Patient sedated: no        Specimen: no    Culture: no   Procedure Details   Patient tolerance: patient tolerated the procedure well with no immediate complications           Current catheter removed without difficulty after deflation of an intact balloon  Site prepped with Betadine, new 18F  suprapubic spt change via aseptic technique without incident, 10 ml balloon inflated with sterile water  Irrigated easily for clear return, mild spasm noted  Patient tolerated well  Catheter plug attached        Vitals:    03/14/23 1309   BP: 136/78   Pulse: 74   Weight: 81 6 kg (180 lb)   Height: 5' 4 5" (1 638 m)         Lena Machado RN

## 2023-03-14 NOTE — PROGRESS NOTES
I supervised the Advanced Practitioner  I reviewed the Advanced Practitioner note and agree      Sadia Denny MD 03/14/23

## 2023-03-27 ENCOUNTER — NURSE TRIAGE (OUTPATIENT)
Dept: OTHER | Facility: OTHER | Age: 83
End: 2023-03-27

## 2023-03-27 NOTE — TELEPHONE ENCOUNTER
"Patient has a capped suprapubic catheter that he manually empties  Pt's son is calling because Zabrina Weston has leaking around the catheter site  It started today and seems to be positional   Pt is not due for a catheter exchange for another week and a half  Please call to advise  Reason for Disposition  • Leakage of urine around catheter    Answer Assessment - Initial Assessment Questions  1  SYMPTOMS: \"What symptoms are you concerned about? \"     Leaking around catheter site  2  ONSET:  \"When did the symptoms start? \"      no  3  FEVER: \"Is there a fever? \" If Yes, ask: \"What is the temperature, how was it measured, and when did it start? \"      no  4  ABDOMINAL PAIN: \"Is there any abdominal pain? \" (e g , Scale 1-10; or mild, moderate, severe)      no    Protocols used: URINARY CATHETER SYMPTOMS AND QUESTIONS-ADULT-[place]    "

## 2023-03-27 NOTE — TELEPHONE ENCOUNTER
Called and left VM for patients son Geraldine Jain to return call to further discuss leakage around patients SPT  As long as SPT is draining without difficulty, patient does not need sooner visit for SPT change  Does have prn order for Oxybutynin to assist with spasms/leakage  Will await sons call for further triage

## 2023-03-27 NOTE — TELEPHONE ENCOUNTER
"Regarding: catheter has a leak/crack in it  ----- Message from Tatianna Freire sent at 3/27/2023 10:58 AM EDT -----  \" My dad's catheter has some sort of leak or crack in it  I'm not sure what to do about it  \"    "

## 2023-03-27 NOTE — TELEPHONE ENCOUNTER
Called spoke with son, reports having leakage from his SPT tube started today  Denies fever, chills, nausea or vomiting  Son reports as long as bag is empty then it is fine  No blood in urine  Reports SPT is draining fine  Asked if pt is having spasm and taking his oxybutynin, son denies  Did advise he can take this to se if it helps with the leakage because it could be due to spasms  Son thinks tube has a crack in it  States that patient does mess around with it quite a bit  Will forward to clinical pool to determine if sooner appt is needed for exchange

## 2023-03-28 NOTE — TELEPHONE ENCOUNTER
Called and left another VM for patients noah Yung to return call if patient is still experiencing issues with leakage around SPT  Advised to take Oxybutynin prn as ordered and follow up as scheduled on 4/6, unless issues with blockage, then contact the office sooner

## 2023-04-06 ENCOUNTER — PROCEDURE VISIT (OUTPATIENT)
Dept: UROLOGY | Facility: CLINIC | Age: 83
End: 2023-04-06

## 2023-04-06 VITALS
WEIGHT: 178.6 LBS | DIASTOLIC BLOOD PRESSURE: 80 MMHG | HEART RATE: 79 BPM | SYSTOLIC BLOOD PRESSURE: 128 MMHG | BODY MASS INDEX: 29.76 KG/M2 | HEIGHT: 65 IN

## 2023-04-06 DIAGNOSIS — R33.9 URINARY RETENTION: Primary | ICD-10-CM

## 2023-04-06 DIAGNOSIS — N32.89 BLADDER SPASM: ICD-10-CM

## 2023-04-06 DIAGNOSIS — N40.1 BPH WITH OBSTRUCTION/LOWER URINARY TRACT SYMPTOMS: ICD-10-CM

## 2023-04-06 DIAGNOSIS — N13.8 BPH WITH OBSTRUCTION/LOWER URINARY TRACT SYMPTOMS: ICD-10-CM

## 2023-04-06 NOTE — PROGRESS NOTES
"4/6/2023    Yulia Smalls  1940  [id number]    Diagnosis: Urinary retention, BPH with obstruction/LUTS, Bladder spasms   Chief Complaint    SPT change         Patient presents for SPT exchange managed by Dr Vidhi Stoner  Follow up in 3 weeks for next SPT change    Procedure: Suprapubic Tube Change         Cystostomy tube change     Date/Time 4/6/2023 10:46 AM     Performed by  Constance Moscoso RN     Authorized by Piter Bustos MD      Universal Protocol   Consent: Verbal consent obtained  Risks and benefits: risks, benefits and alternatives were discussed  Consent given by: patient  Patient understanding: patient states understanding of the procedure being performed  Patient identity confirmed: verbally with patient        Local anesthesia used: no     Anesthesia   Local anesthesia used: no     Sedation   Patient sedated: no        Specimen: no    Culture: no   Procedure Details   Patient tolerance: patient tolerated the procedure well with no immediate complications           Current catheter removed without difficulty after deflation of an intact balloon  Site prepped with Betadine, new 18F  suprapubic spt change via aseptic technique without incident, 10 ml balloon inflated with sterile water  Irrigated easily for clear return, mild spasm noted  Patient tolerated well  Catheter plug attached        Vitals:    04/06/23 1028   BP: 128/80   Pulse: 79   Weight: 81 kg (178 lb 9 6 oz)   Height: 5' 4 5\" (1 638 m)         Constance Moscoso RN  "

## 2023-04-28 ENCOUNTER — PROCEDURE VISIT (OUTPATIENT)
Dept: UROLOGY | Facility: CLINIC | Age: 83
End: 2023-04-28

## 2023-04-28 VITALS
HEIGHT: 65 IN | DIASTOLIC BLOOD PRESSURE: 70 MMHG | WEIGHT: 179 LBS | BODY MASS INDEX: 29.82 KG/M2 | SYSTOLIC BLOOD PRESSURE: 120 MMHG | HEART RATE: 70 BPM

## 2023-04-28 DIAGNOSIS — N32.89 BLADDER SPASM: ICD-10-CM

## 2023-04-28 DIAGNOSIS — N13.8 BPH WITH OBSTRUCTION/LOWER URINARY TRACT SYMPTOMS: ICD-10-CM

## 2023-04-28 DIAGNOSIS — N40.1 BPH WITH OBSTRUCTION/LOWER URINARY TRACT SYMPTOMS: ICD-10-CM

## 2023-04-28 DIAGNOSIS — R33.9 URINARY RETENTION: Primary | ICD-10-CM

## 2023-04-28 NOTE — PROGRESS NOTES
I supervised the Advanced Practitioner  I reviewed the Advanced Practitioner note and agree      Suraj Wiley MD 04/28/23

## 2023-04-28 NOTE — PROGRESS NOTES
"4/28/2023    Mary Braga  1940  [id number]    Diagnosis: Urinary retention, BPH with , Bladder spasms   Chief Complaint    SPT change         Patient presents for routine SPT change managed by Dr Natalio Lowry  Follow up in 3 weeks for next SPT change  Patient and son prefer q3 week changes due to patient being prone to sediment/blockage     Procedure: Suprapubic Tube Change         Cystostomy tube change     Date/Time 4/28/2023 9:30 AM     Performed by  Audrey Slaughter RN     Authorized by Nicky Alejandra MD      Universal Protocol   Consent: Verbal consent obtained  Risks and benefits: risks, benefits and alternatives were discussed  Consent given by: patient  Patient understanding: patient states understanding of the procedure being performed  Patient consent: the patient's understanding of the procedure matches consent given  Patient identity confirmed: verbally with patient        Local anesthesia used: no     Anesthesia   Local anesthesia used: no     Sedation   Patient sedated: no        Specimen: no    Culture: no   Procedure Details   Patient tolerance: patient tolerated the procedure well with no immediate complications           Current catheter removed without difficulty after deflation of an intact balloon  Site prepped with Betadine, new 18F  suprapubic spt change via aseptic technique without incident, 10 ml balloon inflated with sterile water  Irrigated easily for straw-yellow return, mild spasm noted  Patient tolerated well  Catheter plug attached       Vitals:    04/28/23 0929   BP: 120/70   Pulse: 70   Weight: 81 2 kg (179 lb)   Height: 5' 4 5\" (1 638 m)         Audrey Slaughter RN  "

## 2023-05-22 ENCOUNTER — HOSPITAL ENCOUNTER (OUTPATIENT)
Facility: HOSPITAL | Age: 83
Setting detail: OBSERVATION
Discharge: HOME | End: 2023-05-23
Attending: EMERGENCY MEDICINE | Admitting: INTERNAL MEDICINE

## 2023-05-22 ENCOUNTER — APPOINTMENT (EMERGENCY)
Dept: CT IMAGING | Facility: HOSPITAL | Age: 83
End: 2023-05-22

## 2023-05-22 ENCOUNTER — TELEPHONE (OUTPATIENT)
Dept: UROLOGY | Facility: CLINIC | Age: 83
End: 2023-05-22

## 2023-05-22 DIAGNOSIS — R55 SYNCOPE: ICD-10-CM

## 2023-05-22 DIAGNOSIS — I63.9 CVA (CEREBRAL VASCULAR ACCIDENT) (HCC): Primary | ICD-10-CM
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
## 2023-05-22 PROBLEM — M19.90 OSTEOARTHRITIS: Status: ACTIVE | Noted: 2023-05-22

## 2023-05-22 PROBLEM — R33.9 URINARY RETENTION: Status: ACTIVE | Noted: 2023-05-22

## 2023-05-22 PROBLEM — G31.84 MILD COGNITIVE IMPAIRMENT: Status: ACTIVE | Noted: 2023-05-22

## 2023-05-22 PROBLEM — N18.4 STAGE 4 CHRONIC KIDNEY DISEASE (HCC): Status: ACTIVE | Noted: 2023-05-22

## 2023-05-22 PROBLEM — H90.3 BILATERAL SENSORINEURAL HEARING LOSS: Status: ACTIVE | Noted: 2023-05-22

## 2023-05-22 LAB
ABO GROUP BLD: NORMAL
ANION GAP SERPL CALCULATED.3IONS-SCNC: 8 MMOL/L (ref 4–13)
APTT PPP: 28 SECONDS (ref 23–37)
BLD GP AB SCN SERPL QL: NEGATIVE
BUN SERPL-MCNC: 31 MG/DL (ref 5–25)
CALCIUM SERPL-MCNC: 9.2 MG/DL (ref 8.4–10.2)
CARDIAC TROPONIN I PNL SERPL HS: 14 NG/L
CHLORIDE SERPL-SCNC: 106 MMOL/L (ref 96–108)
CO2 SERPL-SCNC: 24 MMOL/L (ref 21–32)
CREAT SERPL-MCNC: 1.65 MG/DL (ref 0.6–1.3)
ERYTHROCYTE [DISTWIDTH] IN BLOOD BY AUTOMATED COUNT: 12.8 % (ref 11.6–15.1)
FLUAV RNA RESP QL NAA+PROBE: NEGATIVE
FLUBV RNA RESP QL NAA+PROBE: NEGATIVE
GFR SERPL CREATININE-BSD FRML MDRD: 38 ML/MIN/1.73SQ M
GLUCOSE SERPL-MCNC: 127 MG/DL (ref 65–140)
GLUCOSE SERPL-MCNC: 132 MG/DL (ref 65–140)
HCT VFR BLD AUTO: 41.8 % (ref 36.5–49.3)
HGB BLD-MCNC: 13.9 G/DL (ref 12–17)
INR PPP: 1.01 (ref 0.84–1.19)
MCH RBC QN AUTO: 32.3 PG (ref 26.8–34.3)
MCHC RBC AUTO-ENTMCNC: 33.3 G/DL (ref 31.4–37.4)
MCV RBC AUTO: 97 FL (ref 82–98)
PLATELET # BLD AUTO: 112 THOUSANDS/UL (ref 149–390)
PMV BLD AUTO: 9.1 FL (ref 8.9–12.7)
POTASSIUM SERPL-SCNC: 4.1 MMOL/L (ref 3.5–5.3)
PROTHROMBIN TIME: 13.3 SECONDS (ref 11.6–14.5)
RBC # BLD AUTO: 4.31 MILLION/UL (ref 3.88–5.62)
RH BLD: POSITIVE
RSV RNA RESP QL NAA+PROBE: NEGATIVE
SARS-COV-2 RNA RESP QL NAA+PROBE: NEGATIVE
SODIUM SERPL-SCNC: 138 MMOL/L (ref 135–147)
SPECIMEN EXPIRATION DATE: NORMAL
WBC # BLD AUTO: 7.85 THOUSAND/UL (ref 4.31–10.16)

## 2023-05-22 RX ORDER — ATORVASTATIN CALCIUM 40 MG/1
40 TABLET, FILM COATED ORAL EVERY EVENING
Status: DISCONTINUED | OUTPATIENT
Start: 2023-05-22 | End: 2023-05-23 | Stop reason: HOSPADM

## 2023-05-22 RX ORDER — MELATONIN
2000 DAILY
Status: DISCONTINUED | OUTPATIENT
Start: 2023-05-23 | End: 2023-05-23 | Stop reason: HOSPADM

## 2023-05-22 RX ORDER — FINASTERIDE 5 MG/1
5 TABLET, FILM COATED ORAL DAILY
Status: DISCONTINUED | OUTPATIENT
Start: 2023-05-23 | End: 2023-05-23 | Stop reason: HOSPADM

## 2023-05-22 RX ORDER — ACETAMINOPHEN 325 MG/1
650 TABLET ORAL EVERY 4 HOURS PRN
Status: DISCONTINUED | OUTPATIENT
Start: 2023-05-22 | End: 2023-05-23 | Stop reason: HOSPADM

## 2023-05-22 RX ORDER — ASPIRIN 325 MG
325 TABLET ORAL ONCE
Status: COMPLETED | OUTPATIENT
Start: 2023-05-22 | End: 2023-05-22

## 2023-05-22 RX ORDER — ASPIRIN 81 MG/1
81 TABLET, CHEWABLE ORAL DAILY
Status: DISCONTINUED | OUTPATIENT
Start: 2023-05-23 | End: 2023-05-23 | Stop reason: HOSPADM

## 2023-05-22 RX ORDER — OXYBUTYNIN CHLORIDE 5 MG/1
5 TABLET ORAL 2 TIMES DAILY PRN
Status: DISCONTINUED | OUTPATIENT
Start: 2023-05-22 | End: 2023-05-23 | Stop reason: HOSPADM

## 2023-05-22 RX ORDER — SODIUM CHLORIDE 9 MG/ML
125 INJECTION, SOLUTION INTRAVENOUS CONTINUOUS
Status: DISCONTINUED | OUTPATIENT
Start: 2023-05-22 | End: 2023-05-22

## 2023-05-22 RX ORDER — ONDANSETRON 2 MG/ML
4 INJECTION INTRAMUSCULAR; INTRAVENOUS EVERY 6 HOURS PRN
Status: DISCONTINUED | OUTPATIENT
Start: 2023-05-22 | End: 2023-05-23 | Stop reason: HOSPADM

## 2023-05-22 RX ORDER — TAMSULOSIN HYDROCHLORIDE 0.4 MG/1
0.8 CAPSULE ORAL
Status: DISCONTINUED | OUTPATIENT
Start: 2023-05-23 | End: 2023-05-23 | Stop reason: HOSPADM

## 2023-05-22 RX ORDER — HEPARIN SODIUM 5000 [USP'U]/ML
5000 INJECTION, SOLUTION INTRAVENOUS; SUBCUTANEOUS EVERY 8 HOURS SCHEDULED
Status: DISCONTINUED | OUTPATIENT
Start: 2023-05-22 | End: 2023-05-23 | Stop reason: HOSPADM

## 2023-05-22 RX ADMIN — SODIUM CHLORIDE 125 ML/HR: 0.9 INJECTION, SOLUTION INTRAVENOUS at 23:52

## 2023-05-22 RX ADMIN — IOHEXOL 100 ML: 350 INJECTION, SOLUTION INTRAVENOUS at 19:46

## 2023-05-22 RX ADMIN — HEPARIN SODIUM 5000 UNITS: 5000 INJECTION INTRAVENOUS; SUBCUTANEOUS at 21:30

## 2023-05-22 RX ADMIN — OXYBUTYNIN CHLORIDE 5 MG: 5 TABLET ORAL at 23:31

## 2023-05-22 RX ADMIN — ASPIRIN 325 MG: 325 TABLET ORAL at 21:30

## 2023-05-22 RX ADMIN — ATORVASTATIN CALCIUM 40 MG: 40 TABLET, FILM COATED ORAL at 21:30

## 2023-05-22 RX ADMIN — IOHEXOL 1 ML: 350 INJECTION, SOLUTION INTRAVENOUS at 19:51

## 2023-05-22 NOTE — TELEPHONE ENCOUNTER
Patient was a no show to his nurse visit for SPT change this morning   Call placed to patients son, José Rahman, and appointment was re-scheduled to 5/26/23 @ 10:30 am

## 2023-05-22 NOTE — ED NOTES
Portable x ray not available yet, go to CT now as per Dr More Quezada since patient also concerning for stroke        Jorge Velasquez, RN  05/22/23 7912 Kris Yancey, JERED  05/22/23 3461

## 2023-05-23 ENCOUNTER — APPOINTMENT (OUTPATIENT)
Dept: MRI IMAGING | Facility: HOSPITAL | Age: 83
End: 2023-05-23

## 2023-05-23 ENCOUNTER — APPOINTMENT (OUTPATIENT)
Dept: NON INVASIVE DIAGNOSTICS | Facility: HOSPITAL | Age: 83
End: 2023-05-23

## 2023-05-23 VITALS
RESPIRATION RATE: 22 BRPM | HEART RATE: 59 BPM | DIASTOLIC BLOOD PRESSURE: 95 MMHG | SYSTOLIC BLOOD PRESSURE: 118 MMHG | WEIGHT: 177.69 LBS | HEIGHT: 64 IN | OXYGEN SATURATION: 94 % | BODY MASS INDEX: 30.34 KG/M2 | TEMPERATURE: 97.8 F

## 2023-05-23 LAB
2HR DELTA HS TROPONIN: 15 NG/L
ANION GAP SERPL CALCULATED.3IONS-SCNC: 7 MMOL/L (ref 4–13)
ATRIAL RATE: 68 BPM
BUN SERPL-MCNC: 32 MG/DL (ref 5–25)
CALCIUM SERPL-MCNC: 8.6 MG/DL (ref 8.4–10.2)
CARDIAC TROPONIN I PNL SERPL HS: 29 NG/L
CHLORIDE SERPL-SCNC: 108 MMOL/L (ref 96–108)
CHOLEST SERPL-MCNC: 119 MG/DL
CO2 SERPL-SCNC: 23 MMOL/L (ref 21–32)
CREAT SERPL-MCNC: 1.64 MG/DL (ref 0.6–1.3)
ERYTHROCYTE [DISTWIDTH] IN BLOOD BY AUTOMATED COUNT: 13 % (ref 11.6–15.1)
EST. AVERAGE GLUCOSE BLD GHB EST-MCNC: 108 MG/DL
GFR SERPL CREATININE-BSD FRML MDRD: 38 ML/MIN/1.73SQ M
GLUCOSE P FAST SERPL-MCNC: 86 MG/DL (ref 65–99)
GLUCOSE SERPL-MCNC: 86 MG/DL (ref 65–140)
HBA1C MFR BLD: 5.4 %
HCT VFR BLD AUTO: 38 % (ref 36.5–49.3)
HDLC SERPL-MCNC: 38 MG/DL
HGB BLD-MCNC: 12.5 G/DL (ref 12–17)
LDLC SERPL CALC-MCNC: 61 MG/DL (ref 0–100)
MCH RBC QN AUTO: 32 PG (ref 26.8–34.3)
MCHC RBC AUTO-ENTMCNC: 32.9 G/DL (ref 31.4–37.4)
MCV RBC AUTO: 97 FL (ref 82–98)
P AXIS: 27 DEGREES
PLATELET # BLD AUTO: 120 THOUSANDS/UL (ref 149–390)
PMV BLD AUTO: 9.5 FL (ref 8.9–12.7)
POTASSIUM SERPL-SCNC: 4 MMOL/L (ref 3.5–5.3)
PR INTERVAL: 244 MS
QRS AXIS: -25 DEGREES
QRSD INTERVAL: 90 MS
QT INTERVAL: 376 MS
QTC INTERVAL: 399 MS
RBC # BLD AUTO: 3.91 MILLION/UL (ref 3.88–5.62)
SODIUM SERPL-SCNC: 138 MMOL/L (ref 135–147)
T WAVE AXIS: 51 DEGREES
TRIGL SERPL-MCNC: 100 MG/DL
TSH SERPL DL<=0.05 MIU/L-ACNC: 4.44 UIU/ML (ref 0.45–4.5)
VENTRICULAR RATE: 68 BPM
WBC # BLD AUTO: 7.07 THOUSAND/UL (ref 4.31–10.16)

## 2023-05-23 RX ORDER — ASPIRIN 81 MG/1
81 TABLET, CHEWABLE ORAL DAILY
Qty: 30 TABLET | Refills: 0 | Status: SHIPPED | OUTPATIENT
Start: 2023-05-24 | End: 2023-06-23

## 2023-05-23 RX ORDER — ATORVASTATIN CALCIUM 40 MG/1
40 TABLET, FILM COATED ORAL EVERY EVENING
Qty: 30 TABLET | Refills: 0 | Status: SHIPPED | OUTPATIENT
Start: 2023-05-23 | End: 2023-06-22

## 2023-05-23 RX ADMIN — HEPARIN SODIUM 5000 UNITS: 5000 INJECTION INTRAVENOUS; SUBCUTANEOUS at 15:02

## 2023-05-23 RX ADMIN — TAMSULOSIN HYDROCHLORIDE 0.8 MG: 0.4 CAPSULE ORAL at 16:39

## 2023-05-23 RX ADMIN — GADOBUTROL 8 ML: 604.72 INJECTION INTRAVENOUS at 13:03

## 2023-05-23 RX ADMIN — ASPIRIN 81 MG CHEWABLE TABLET 81 MG: 81 TABLET CHEWABLE at 09:15

## 2023-05-23 RX ADMIN — CHOLECALCIFEROL TAB 25 MCG (1000 UNIT) 2000 UNITS: 25 TAB at 09:15

## 2023-05-23 RX ADMIN — FINASTERIDE 5 MG: 5 TABLET, FILM COATED ORAL at 09:16

## 2023-05-23 RX ADMIN — HEPARIN SODIUM 5000 UNITS: 5000 INJECTION INTRAVENOUS; SUBCUTANEOUS at 05:42

## 2023-05-23 NOTE — ASSESSMENT & PLAN NOTE
"· Placed in observation telemetry  · CT imaging revealed \"No evidence of acute intracranial hemorrhage  Low-attenuation likely subacute infarct in  the left temporal occipital watershed distribution  Left postcentral gyrus possible acute to subacute  ischemia  6 mm soft tissue nodular projection into the left frontal horn lateral ventricle for which  contrast-enhanced brain MRI is recommended for further evaluation\"  · Case was discussed by ER provider with stroke neuro on-call  · Obtain vital signs and neurochecks per stroke protocol  · Allow permissive hypertension  · Give aspirin 81 mg p o  daily and Lipitor 40 mg p o  daily  · Obtain MRI and echocardiogram in a m    · Consult neurology  "

## 2023-05-23 NOTE — PLAN OF CARE
Problem: OCCUPATIONAL THERAPY ADULT  Goal: Performs self-care activities at highest level of function for planned discharge setting  See evaluation for individualized goals  Description: Treatment Interventions: ADL retraining, Functional transfer training, Cognitive reorientation, Activityengagement, Endurance training, Energy conservation, Patient/family training          See flowsheet documentation for full assessment, interventions and recommendations  Note: Limitation: Decreased ADL status, Decreased Safe judgement during ADL, Decreased cognition, Decreased high-level ADLs, Decreased self-care trans  Prognosis: Good  Assessment: Pt is a 81 y o  male seen for OT evaluation s/p admit to [address] [place] [address] on 5/22/2023 w/ CVA (cerebral vascular accident) ([facility] [address] )  Prior to admission pt was I with ADLs and functional mobility, and received assistance with IADLs, meal prep, med management, and driving  Comorbidities impacting pt's functional performance at time of assessment include incomplete bladder emptying, CKD, and syncope  Current personal factors limiting pt's occupational performance include difficulty performing ADLS, difficulty performing IADLS , limited insight into deficits and decreased initiation and engagement   Current deficits impacting occupational performance include impaired attention, impaired initiation, and impaired memory  Pt to benefit from continued skilled OT tx during this hospital stay to address deficits listed above  Areas of occupational performance to address include bathing/shower, toilet hygiene, dressing, functional mobility and clothing management  From an OT standpoint, recommendation at time of d/c would be Home OT       OT Discharge Recommendation: Home with home health rehabilitation     PEREZ Barnes

## 2023-05-23 NOTE — DISCHARGE SUMMARY
[address]  Discharge- Duarte Nieves 1940, 81 y o  male MRN: [id number]  Unit/Bed#: -01 Encounter: [id number]  Primary Care Provider: Rachelle Alexandre MD   Date and time admitted to hospital: 5/22/2023  7:16 PM    * Subacute- Chronic CVA (cerebral vascular accident) [facility])  Assessment & Plan  · Presented following syncopal episode; no focal weakness, slurred speech, or facial droop  · No history of atrial fibrillation or signs of arrhythmia on telemetry  · CTA Head/Neck (5/22): No evidence of hemodynamic significant stenosis, aneurysm or dissection  · MRI Brain (5/23): No acute infarction or pathologic intracranial enhancement  Mild cerebral chronic microangiopathic changes  Chronic 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 and bilateral cerebellar hemispheres  Chronic frontotemporal predominant volume loss and ventriculomegaly  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  · Continue ASA 81mg daily and Lipitor 40mg daily  · Outpatient follow-up with Neurology in 4-6 weeks  · Outpatient follow-up with Cardiology recommended  · TTE and event monitor recommended to observe for atrial fibrillation   · PT/OT recommending home health care services    Syncope  Assessment & Plan  · Syncopal event approximately 30 mins to 1 hour following working in the yard  · No further events during admission  · Orthostatics negative  · CT C/A/P (5/22): No acute intrathoracic process  Chronic urinary bladder wall thickening with suprapubic catheter in place and multiple bladder calcifications  These findings likely relate to chronic obstruction although urinary bladder infection cannot be excluded although the appearance is similar to the prior study  Simple renal cysts     · Outpatient Cardiology follow-up recommended as above    Stage 4 chronic kidney disease [facility])  Assessment & Plan  Lab Results Component Value Date    EGFR 38 05/23/2023    EGFR 38 05/22/2023    EGFR 34 04/13/2022    CREATININE 1 64 (H) 05/23/2023    CREATININE 1 65 (H) 05/22/2023    CREATININE 1 81 (H) 04/13/2022     · Creatinine appears to be at baseline  · Continue outpatient Nephrology follow-up    Benign prostatic hyperplasia with incomplete bladder emptying  Assessment & Plan  · With chronic supra-pubic catheter in place  · Continue Flomax 0 8 mg p o  daily, Ditropan 5 mg p o  twice daily as needed, and Proscar 5 mg p o  daily  · Continue outpatient follow-up with Urology       Medical Problems     Resolved Problems  Date Reviewed: 5/22/2023   None       Discharging Physician / Practitioner: Jen Augustin DO  PCP: Vania Carlton MD  Admission Date:   Admission Orders (From admission, onward)     Ordered        05/22/23 2101  Place in Observation  Once                      Discharge Date: 05/23/23    Consultations During Hospital Stay:  · Neurology, PT/OT    Procedures Performed:   · None    Significant Findings / Test Results:   · CTA Head/Neck (5/22): No evidence of hemodynamic significant stenosis, aneurysm or dissection  · MRI Brain (5/23): No acute infarction or pathologic intracranial enhancement  Mild cerebral chronic microangiopathic changes  Chronic 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 and bilateral cerebellar hemispheres  Chronic frontotemporal predominant volume loss and ventriculomegaly  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  · CT C/A/P (5/22): No acute intrathoracic process  Chronic urinary bladder wall thickening with suprapubic catheter in place and multiple bladder calcifications  These findings likely relate to chronic obstruction although urinary bladder infection cannot be excluded although the appearance is similar to the prior study  Simple renal cysts       Incidental Findings:   · None Test Results Pending at Discharge (will require follow up): · None     Outpatient Tests Requested:  · Echocardiogram and event monitor recommended to observe for atrial fibrillation     Complications:  None    Reason for Admission: Syncope    Hospital Course:   Evangelina Murray is a 81 y o  male patient who originally presented to the hospital on 5/22/2023 due to syncope  Please see H&P as documented by Yovani Castro for complete details regarding history of presenting illness  In brief, the patient has a PMHx of CKD4 and BPH with suprapubic catheter who presented to the ED following a syncopal event  The patient notes that he was working in his yard and approximately 30 minutes to an hour following this, he lost consciousness and hit his head  He denies chest pain, dizziness, or shortness of breath prior to this incident  No seizure activity, bowel, or bladder incontinence was noted  He denies any focal weakness, slurred speech, or facial droop  Upon arrival he was a stroke alert and CTA head and neck was performed which demonstrated no evidence of large vessel occlusion, aneurysm, or dissection  The patient was advised for admission via the stroke pathway to complete MRI and telemetry monitoring  During admission he had no further syncopal events, no events on telemetry, and noted feeling back to his baseline  MRI brain was completed and no acute infarction was demonstrated but there was evidence of chronic 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 and bilateral cerebellar hemispheres  Given findings of chronic CVAs, aspirin and atorvastatin was recommended  Additionally, he was advised to follow-up outpatient with cardiology for echocardiogram and event monitor  Prior to his departure he was evaluated by physical therapy who recommended home health care services upon discharge    He was ultimately discharged in stable condition and all of his questions and his son's questions were answered "prior to his departure  This is a brief discharge summary please see full medical record for more details  Please see above list of diagnoses and related plan for additional information  Condition at Discharge: stable    Discharge Day Visit / Exam:   Subjective: Patient resting comfortably in bed without any acute complaints  No significant overnight events reported by nursing  Vitals: Blood Pressure: 110/56 (05/23/23 0905)  Pulse: 68 (05/23/23 0905)  Temperature: 98 2 °F (36 8 °C) (05/23/23 0905)  Temp Source: Oral (05/23/23 0905)  Respirations: 18 (05/23/23 0905)  Height: 5' 4\" (162 6 cm) (05/22/23 2130)  Weight - Scale: 80 6 kg (177 lb 11 1 oz) (05/22/23 2130)  SpO2: 95 % (05/23/23 0844)     Exam:   Physical Exam  Vitals and nursing note reviewed  Constitutional:       General: He is not in acute distress  Appearance: Normal appearance  He is obese  HENT:      Head: Normocephalic and atraumatic  Mouth/Throat:      Mouth: Mucous membranes are moist       Pharynx: Oropharynx is clear  Eyes:      Extraocular Movements: Extraocular movements intact  Conjunctiva/sclera: Conjunctivae normal    Cardiovascular:      Rate and Rhythm: Normal rate and regular rhythm  Pulses: Normal pulses  Heart sounds: Normal heart sounds  Pulmonary:      Effort: Pulmonary effort is normal  No respiratory distress  Breath sounds: Normal breath sounds  No wheezing  Abdominal:      General: Bowel sounds are normal  There is no distension  Palpations: Abdomen is soft  Tenderness: There is no abdominal tenderness  Musculoskeletal:         General: Normal range of motion  Cervical back: Normal range of motion and neck supple  Skin:     General: Skin is warm and dry  Neurological:      General: No focal deficit present  Mental Status: He is alert and oriented to person, place, and time     Psychiatric:         Mood and Affect: Mood normal          Behavior: Behavior " normal         Discussion with Family: Updated  (son) via phone  Discharge instructions/Information to patient and family:   See after visit summary for information provided to patient and family  Provisions for Follow-Up Care:  See after visit summary for information related to follow-up care and any pertinent home health orders  Disposition:   Home with VNA Services (Reminder: Complete face to face encounter)    Planned Readmission: None     Discharge Statement:  I spent > 35 minutes discharging the patient  This time was spent on the day of discharge  I had direct contact with the patient on the day of discharge  Greater than 50% of the total time was spent examining patient, answering all patient questions, arranging and discussing plan of care with patient as well as directly providing post-discharge instructions  Additional time then spent on discharge activities  Discharge Medications:  See after visit summary for reconciled discharge medications provided to patient and/or family        **Please Note: This note may have been constructed using a voice recognition system**

## 2023-05-23 NOTE — ASSESSMENT & PLAN NOTE
Continue prehospital Flomax 0 8 mg p o  daily, Ditropan 5 mg p o  twice daily as needed and Proscar 5 mg p o  daily

## 2023-05-23 NOTE — CASE MANAGEMENT
Case Management Assessment & Discharge Planning Note    Patient name Sun Bryanleod  Location /-69 MRN [id number]  : 1940 Date 2023       Current Admission Date: 2023  Current Admission Diagnosis:CVA (cerebral vascular accident) [facility])   Patient Active Problem List    Diagnosis Date Noted   • Bilateral sensorineural hearing loss 2023   • Mild cognitive impairment 2023   • Osteoarthritis 2023   • Stage 4 chronic kidney disease ([facility] [address] ) 2023   • Urinary retention 2023   • CVA (cerebral vascular accident) ([facility] [address] ) 2023   • Syncope 2023   • Obstructive uropathy 06/15/2021   • Hematuria 2021   • Benign prostatic hyperplasia with incomplete bladder emptying 2021   • Essential hypertension 2021      LOS (days): 0  Geometric Mean LOS (GMLOS) (days):   Days to GMLOS:     OBJECTIVE:         Current admission status: Observation  Referral Reason: VNA    Preferred Pharmacy:   [facility] [id number] [place], [address] [facility] [address] [place] [id number]  Phone: [phone] Fax: [phone]    Primary Care Provider: Valeria Sanches MD    Primary Insurance: [id number] [facility]  Secondary Insurance: MEDICARE    ASSESSMENT:  Active Health Care Proxies    There are no active Health Care Proxies on file         Advance Directives  Does patient have a [address] [facility] [address]?: Yes  Does patient have Advance Directives?: Yes  Advance Directives: Living will, Power of  for health care  Primary Contact: Shiva Adhikari (Son)   [phone] (Mobile)    Readmission Root Cause  30 Day Readmission: No    Patient Information  Admitted from[de-identified] Home  Mental Status: Alert  During Assessment patient was accompanied by: Not accompanied during assessment  Assessment information provided by[de-identified] Patient, Son  Primary Caregiver: Self  Support Systems: Self, Sharla Alejandra Dr of Residence: [address] [place] [address] Avenue do you live in?: [address] [employer] entry access options  Select all that apply : Stairs  Number of steps to enter home  : 1  Do the steps have railings?: No  Type of Current Residence: 2 story home  Upon entering residence, is there a bedroom on the main floor (no further steps)?: Yes  Upon entering residence, is there a bathroom on the main floor (no further steps)?: Yes  In the last 12 months, was there a time when you were not able to pay the mortgage or rent on time?: No  In the last 12 months, how many places have you lived?: 1  In the last 12 months, was there a time when you did not have a steady place to sleep or slept in a shelter (including now)?: No  Homeless/housing insecurity resource given?: N/A  Living Arrangements: Lives w/ Son  Is patient a ?: Yes  Is patient active with [facility])?: Yes  Is patient service connected?: No    Activities of Daily Living Prior to Admission  Functional Status: Independent  Completes ADLs independently?: Yes  Ambulates independently?: Yes  Does patient use assisted devices?: No  Does patient currently own DME?: No  Does patient have a history of Outpatient Therapy (PT/OT)?: No  Does the patient have a history of Short-Term Rehab?: No  Does patient have a history of HHC?: No  Does patient currently have [facility]?: No    Patient Information Continued  Income Source: Pension/detention  Does patient have prescription coverage?: Yes  Within the past 12 months, you worried that your food would run out before you got the money to buy more : Never true  Within the past 12 months, the food you bought just didn't last and you didn't have money to get more : Never true  Food insecurity resource given?: N/A  Does patient receive dialysis treatments?: No  Does patient have a history of substance abuse?: No  Does patient have a history of Mental Health Diagnosis?: No    Means of Transportation  [facility]] Family transport  In the past 12 months, has lack of transportation kept you from medical appointments or from getting medications?: No  In the past 12 months, has lack of transportation kept you from meetings, work, or from getting things needed for daily living?: No  Was application for public transport provided?: N/A    DISCHARGE DETAILS:    Discharge planning discussed with[de-identified] Patient and son Gallo Chowdhury of Choice: Yes     CM contacted family/caregiver?: Yes  Were Treatment Team discharge recommendations reviewed with patient/caregiver?: Yes  Did patient/caregiver verbalize understanding of patient care needs?: Yes  Were patient/caregiver advised of the risks associated with not following Treatment Team discharge recommendations?: Yes    Contacts  Patient Contacts: Weston Aase (Son)   [phone] (Mobile)  Relationship to Patient[de-identified] Family  Contact Method: Phone  Phone Number: Weston Aase (Son)   [phone] (Mobile)  Reason/Outcome: Emergency Contact, Discharge [address]         Is the patient interested in [facility] at discharge?: Yes    DME Referral Provided  Referral made for DME?: No (Son has a walker for patient)    Other Referral/Resources/Interventions Provided:  Interventions: [facility]    Treatment Team Recommendation: Home with [facility] [employer]        Additional Comments: Met with patient at bedside and discussed therapy eval and recs for [facility]  Patient not sure if son wants people in home  Call to son Marcus Farnsworth to discuss discharge planning and son agreeable to Home Therapy    [facility] referrals made via [employer]

## 2023-05-23 NOTE — ASSESSMENT & PLAN NOTE
Lab Results   Component Value Date    EGFR 38 05/23/2023    EGFR 38 05/22/2023    EGFR 34 04/13/2022    CREATININE 1 64 (H) 05/23/2023    CREATININE 1 65 (H) 05/22/2023    CREATININE 1 81 (H) 04/13/2022     · Creatinine appears to be at baseline  · Continue outpatient Nephrology follow-up

## 2023-05-23 NOTE — H&P
"[place] 46  H&P  Name: Jeff Ramos 81 y o  male I MRN: [id number]  Unit/Bed#: -01 I Date of Admission: 5/22/2023   Date of Service: 5/22/2023 I Hospital Day: 0      Assessment/Plan   * CVA (cerebral vascular accident) [facility])  Assessment & Plan  · Placed in observation telemetry  · CT imaging revealed \"No evidence of acute intracranial hemorrhage  Low-attenuation likely subacute infarct in  the left temporal occipital watershed distribution  Left postcentral gyrus possible acute to subacute  ischemia  6 mm soft tissue nodular projection into the left frontal horn lateral ventricle for which  contrast-enhanced brain MRI is recommended for further evaluation\"  · Case was discussed by ER provider with stroke neuro on-call  · Obtain vital signs and neurochecks per stroke protocol  · Allow permissive hypertension  · Give aspirin 81 mg p o  daily and Lipitor 40 mg p o  daily  · Obtain MRI and echocardiogram in a m  · Consult neurology    Syncope  Assessment & Plan  · Patient has poor recollection of events  · Placed on telemetry  · Trend cardiac enzymes  · Obtain orthostatic vital signs every shift    Benign prostatic hyperplasia with incomplete bladder emptying  Assessment & Plan  Continue prehospital Flomax 0 8 mg p o  daily, Ditropan 5 mg p o  twice daily as needed and Proscar 5 mg p o  daily    Stage 4 chronic kidney disease [facility])  Assessment & Plan  Lab Results   Component Value Date    EGFR 38 05/22/2023    EGFR 34 04/13/2022    EGFR 33 06/28/2021    CREATININE 1 65 (H) 05/22/2023    CREATININE 1 81 (H) 04/13/2022    CREATININE 1 86 (H) 06/28/2021     Creatinine appears to be slightly better than baseline, will continue to monitor with repeat labs in a m           VTE Prophylaxis: Heparin  Code Status: Level 1  POLST: There is no POLST form on file for this patient (pre-hospital)  Discussion with family: None present at bedside at time of exam    Anticipated Length of Stay:  Patient will " "be admitted on an Observation basis with an anticipated length of stay of  < 2 midnights  Justification for Hospital Stay: CVA requiring further neurological evaluation    Chief Complaint:   Syncope x1 this evening    History of Present Illness:    Hallie Sheikh is a 81 y o  male who presents with syncope x1 this evening  Patient has poor recollection of events likely related to memory deficits from previous stroke who presents to the ER for further evaluation of a syncopal episode  History is extremely unclear as he is unable to tell me what he was doing prior to this event but denies any preceding lightheadedness, dizziness, palpitations, shortness of breath or chest pain and states that he passed out then states perhaps he blacked out  Unfortunately there is no family at bedside at time to clarify this  Patient does have a history of a previous stroke he says 1 year ago  there was concern this evening some dysarthria as well as facial asymmetry and aphasia per history the patient does not voice this  Patient is resting comfortably in bed at time of exam and offers no complaints does state that he has had some memory issues for \"a long time\"  Review of Systems:  Review of Systems   Constitutional: Negative for chills and fever  Respiratory: Negative for cough, shortness of breath and wheezing  Cardiovascular: Negative for chest pain and palpitations  Gastrointestinal: Negative for diarrhea, nausea and vomiting  Genitourinary: Negative for dysuria, frequency, hematuria and urgency  Neurological: Positive for syncope, facial asymmetry and speech difficulty  Negative for weakness, light-headedness and headaches  All other systems reviewed and are negative        Past Medical and Surgical History:   Past Medical History:   Diagnosis Date   • Hypertension    • Urinary retention        Past Surgical History:   Procedure Laterality Date   • CATARACT EXTRACTION     • HERNIA REPAIR     • HIATAL " HERNIA REPAIR     • [place] TRURL ELECTROSURG RESCJ PROSTATE BLEED COMPLETE N/A 7/22/2021    Procedure: TRANSURETHRAL RESECTION OF PROSTATE (TURP), cystoscopy;  Surgeon: Marco A Dyson MD;  Location: [facility];  Service: Urology   • SHOULDER SURGERY Left    • SUPRAPUBIC TUBE PLACEMENT N/A 7/22/2021    Procedure: INSERTION SUPRAPUBIC CATHETER PERCUTANEOUS;  Surgeon: Marco A Dyson MD;  Location: [facility];  Service: Urology       Meds/Allergies:  Prior to Admission medications    Medication Sig Start Date End Date Taking?  Authorizing Provider   cholecalciferol (VITAMIN D3) 1,000 units tablet Take 2,000 Units by mouth daily    Historical Provider, MD   finasteride (PROSCAR) 5 mg tablet Take 1 tablet (5 mg total) by mouth daily 6/28/22   DOLORES Graves   FLUoxetine (PROzac) 10 mg capsule TAKE 1 CAPSULE BY MOUTH EVERY MORNING FOR MOOD  Patient not taking: Reported on 2/22/2023 5/17/21   Historical Provider, MD   HYDROcodone-acetaminophen ([facility]) 5-325 mg per tablet Take 1-2 tablets by mouth every 6 (six) hours as needed for pain for up to 5 dosesMax Daily Amount: 8 tablets  Patient not taking: Reported on 9/27/2021 7/22/21   Marco A Dyson MD   oxybutynin (DITROPAN) 5 mg tablet Take 1 tablet (5 mg total) by mouth 2 (two) times a day as needed (bladder spasms) 2/22/23   DOLORES Graves   phenazopyridine (PYRIDIUM) 200 mg tablet Take 1 tablet (200 mg total) by mouth 3 (three) times a day as needed for bladder spasms  Patient not taking: Reported on 9/27/2021 7/22/21   Marco A Dyson MD   tamsulosin [facility]) 0 4 mg Take 2 capsules (0 8 mg total) by mouth daily with dinner 6/28/22   DOLORES Graves   Zoster Vac Recomb Adjuvanted 50 MCG/0 5ML SUSR INJECT 1 VIAL (0 5ML) INTRAMUSCULARLY ONCE **1ST DOSE**  GIVE FIRST DOSE NOW AND GIVE SECOND DOSE 2 TO 6 MONTHS AFTER INITIAL DOSE **1ST DOSE**  GIVE FIRST DOSE NOW AND GIVE SECOND DOSE 2 TO 6 MONTHS AFTER INITIAL DOSE  Patient not taking: No sig reported 12/24/20   Historical "Provider, MD     I have reviewed home medications using allscripts  Allergies: No Known Allergies    Social History:  Marital Status:    Occupation: Retired maintenance  Patient Pre-hospital Living Situation: Resides at home with son  Patient Pre-hospital Level of Mobility: Full without assist  Patient Pre-hospital Diet Restrictions: None    Social History     Substance and Sexual Activity   Alcohol Use Never     Social History     Tobacco Use   Smoking Status Never   Smokeless Tobacco Current   • Types: Chew     Social History     Substance and Sexual Activity   Drug Use Never       Family History:  I have reviewed the patients family history    Physical Exam:   Vitals:   Blood Pressure: 142/75 (05/22/23 2305)  Pulse: 100 (05/22/23 2305)  Temperature: 98 5 °F (36 9 °C) (05/22/23 2305)  Temp Source: Oral (05/22/23 2305)  Respirations: 20 (05/22/23 2305)  Height: 5' 4\" (162 6 cm) (05/22/23 2130)  Weight - Scale: 80 6 kg (177 lb 11 1 oz) (05/22/23 2130)  SpO2: 94 % (05/22/23 2305)    Physical Exam  Vitals and nursing note reviewed  Constitutional:       General: He is not in acute distress  Appearance: Normal appearance  HENT:      Head: Normocephalic and atraumatic  Right Ear: Tympanic membrane normal       Left Ear: Tympanic membrane normal       Nose: Nose normal       Mouth/Throat:      Mouth: Mucous membranes are moist       Pharynx: No oropharyngeal exudate or posterior oropharyngeal erythema  Eyes:      Extraocular Movements: Extraocular movements intact  Pupils: Pupils are equal, round, and reactive to light  Cardiovascular:      Rate and Rhythm: Normal rate and regular rhythm  Pulses: Normal pulses  Heart sounds: Normal heart sounds  Pulmonary:      Effort: Pulmonary effort is normal  No respiratory distress  Breath sounds: Normal breath sounds  Abdominal:      General: Abdomen is flat  Bowel sounds are normal       Palpations: Abdomen is soft   " Musculoskeletal:         General: Normal range of motion  Cervical back: Normal range of motion and neck supple  Right lower leg: No edema  Left lower leg: No edema  Skin:     General: Skin is warm and dry  Capillary Refill: Capillary refill takes less than 2 seconds  Neurological:      General: No focal deficit present  Mental Status: He is alert and oriented to person, place, and time  Cranial Nerves: Cranial nerves 2-12 are intact  Sensory: Sensation is intact  Motor: Motor function is intact  Coordination: Coordination is intact  Gait: Gait is intact  Psychiatric:         Cognition and Memory: Memory is impaired  Additional Data:   Lab Results: I have personally reviewed pertinent reports  Results from last 7 days   Lab Units 05/22/23  1930   WBC Thousand/uL 7 85   HEMOGLOBIN g/dL 13 9   HEMATOCRIT % 41 8   PLATELETS Thousands/uL 112*     Results from last 7 days   Lab Units 05/22/23  1930   SODIUM mmol/L 138   POTASSIUM mmol/L 4 1   CHLORIDE mmol/L 106   CO2 mmol/L 24   BUN mg/dL 31*   CREATININE mg/dL 1 65*   CALCIUM mg/dL 9 2     Results from last 7 days   Lab Units 05/22/23  1930   INR  1 01     Results from last 7 days   Lab Units 05/22/23  1925   POC GLUCOSE mg/dl 132           Imaging: I have personally reviewed pertinent reports  TRAUMA - CT chest abdomen pelvis w contrast   Final Result by Jatin Greenwood MD (05/22 2027)         1  No acute intrathoracic process  2  Coronary vascular calcifications  3  Chronic urinary bladder wall thickening with suprapubic catheter in place and multiple bladder calcifications  These findings likely relate to chronic obstruction although urinary bladder infection cannot be excluded although the appearance is similar    to the prior study  4  Diverticulosis without evidence of diverticulitis  5  Simple renal cysts  No follow-up for these recommended              Workstation performed: [id number]         CTA stroke alert (head/neck)   Final Result by Ilda Ball MD (05/22 2004)      No evidence of hemodynamic significant stenosis, aneurysm or dissection  I personally discussed this study with Dr Kimberly Cuevas on 5/22/2023 7:48 PM                               Workstation performed: [id number]         CT stroke alert brain   Final Result by Ilda Ball MD (05/22 1956)      No evidence of acute intracranial hemorrhage  Low-attenuation likely subacute infarct in the left temporal occipital watershed distribution  Left postcentral gyrus possible acute to subacute ischemia  6 mm soft tissue nodular projection into the left frontal horn lateral ventricle for which contrast-enhanced brain MRI is recommended for further evaluation  I personally discussed this study with Dr Jesse So on 5/22/2023 7:48 PM             Workstation performed: [id number]         MRI Inpatient Order    (Results Pending)       EKG, Pathology, and Other Studies Reviewed on Admission:   · EKG: N/A    Epic Records Reviewed: Yes     ** Please Note: This note has been constructed using a voice recognition system   **

## 2023-05-23 NOTE — NURSING NOTE
IV site removed  Discharge instructions reviewed with patient and son, verbalized understanding    Pt discharged via wheelchair to car accompanied by PCA and son

## 2023-05-23 NOTE — SPEECH THERAPY NOTE
02/28/22 1400   Post-Acute Status   Post-Acute Authorization Placement   Post-Acute Placement Status Referrals Sent   Hospital Resources/Appts/Education Provided Provided patient/caregiver with written discharge plan information   Discharge Plan   Discharge Plan A Long-term acute care facility (LTAC)   Discharge Plan B Skilled Nursing Facility       LMSW met with the patient at the bedside.     Discharge plan discussed. PT recommendation is LTAC. Patient is agreeable. SW talked to patient about freedom of choice and provided a list. Patient choose [facility].      CM will fax a referral to  [facility].     SW will continue to follow.    Orders received and appreciated, chart review completed  Patient screened for difficulties with expressive language, receptive language, dysarthria and dysphagia  He denies any complications at this time  NSG reports pt took pills without difficulty  MRI is pending  ST to sign off as pt reports he is at his baseline, please reconsult with any changes

## 2023-05-23 NOTE — PLAN OF CARE
Problem: PHYSICAL THERAPY ADULT  Goal: Performs mobility at highest level of function for planned discharge setting  See evaluation for individualized goals  Description: Treatment/Interventions: Functional transfer training, LE strengthening/ROM, Therapeutic exercise, Endurance training, Cognitive reorientation, Patient/family training, Equipment eval/education, Bed mobility, Gait training, Spoke to nursing, Spoke to case management, Spoke to MD  Equipment Recommended: Yuliana Jeronimo       See flowsheet documentation for full assessment, interventions and recommendations  Note: Prognosis: Good  Problem List: Decreased strength, Decreased endurance, Impaired balance, Decreased mobility, Decreased cognition, Impaired judgement, Impaired hearing  Assessment: Pt is 81 y o  male seen for PT evaluation s/p admit to [facility] on 5/22/2023 w/ CVA (cerebral vascular accident) ([facility] [address] )  PT consulted to assess pt's functional mobility and d/c needs  Order placed for PT eval and tx, w/ up and OOB as tolerated order  Comorbidities affecting pt's physical performance at time of assessment include: weakness, CVA,stage 4 CKD,syncope,HTN,B sensorineural hearing loss, OA,syncope   PTA, pt was independent w/ all functional mobility w/ o AD utilization  Personal factors affecting pt at time of IE include: communication issues, inability to ambulate household distances, inability to navigate community distances, inability to navigate level surfaces w/o external assistance, unable to perform dynamic tasks in community, positive fall history, hearing impairments, limited insight into impairments and inability to perform ADLs   Please find objective findings from PT assessment regarding body systems outlined above with impairments and limitations including weakness, impaired balance, decreased endurance, impaired coordination, gait deviations, decreased activity tolerance, decreased functional mobility tolerance, decreased safety awareness, fall risk and decreased cognition  From PT/mobility standpoint, recommendation at time of d/c would be home with home health rehabilitation pending progress in order to facilitate return to PLOF  PT Discharge Recommendation: Home with home health rehabilitation    See flowsheet documentation for full assessment

## 2023-05-23 NOTE — QUICK NOTE
Mri brain reviewed and no acute strokes  He has a chronic left occipital stroke appears embolic and per cta head/neck no clear nidus to have explained that one  Would recommend 2-d echo and outpt cardiac monitoring  Regarding the numerous small vessel lacunafr strokes continue the aspirin, statin initiated here  He will need an outpt neurocognitive assessment as well  No further inpt neuro recs

## 2023-05-23 NOTE — ASSESSMENT & PLAN NOTE
· Patient has poor recollection of events  · Placed on telemetry  · Trend cardiac enzymes  · Obtain orthostatic vital signs every shift

## 2023-05-23 NOTE — ASSESSMENT & PLAN NOTE
· With chronic supra-pubic catheter in place  · Continue Flomax 0 8 mg p o  daily, Ditropan 5 mg p o  twice daily as needed, and Proscar 5 mg p o  daily  · Continue outpatient follow-up with Urology

## 2023-05-23 NOTE — Clinical Note
The patient's raw score on the AM-PAC Daily Activity Inpatient Short Form is 21  A raw score of greater than or equal to 19 suggests the patient may benefit from discharge to post-acute rehabilitation services  Please refer to the recommendation of the Occupational Therapist for safe discharge planning  Pt is a 81 y o  male seen for OT evaluation s/p admit to [facility] on 5/22/2023 w/ CVA (cerebral vascular accident) ([facility] [address] )  Prior to admission pt was I with ADLs and functional mobility, and received assistance with IADLs, meal prep, med management, and driving  Comorbidities impacting pt's functional performance at time of assessment include incomplete bladder emptying, CKD, and syncope  Current personal factors limiting pt's occupational performance include difficulty performing ADLS, difficulty performing IADLS , limited insight into deficits and decreased initiation and engagement   Current deficits impacting occupational performance include impaired attention, impaired initiation, impaired memory and impulsivity  Pt to benefit from continued skilled OT tx during this hospital stay to address deficits listed above  Areas of occupational performance to address include bathing/shower, toilet hygiene, dressing, functional mobility and clothing management  From an OT standpoint, recommendation at time of d/c would be Home Health OT

## 2023-05-23 NOTE — CONSULTS
"Consult - Stroke; ST consulted  Presents s/p syncope  Past medical history significant for stroke, stage 4 CKD, mild cognitive impairment  Weight history reviewed  No significant changes to note  Ordered for Regular diet, thin liquids  Passed RN dysphagia screening  Pt reports having a good, consistent appetite  He usually has 2 meals daily, whichever 2 he feels like eating  Follows a regular diet, NKFA  Reports no difficulty chewing or swallowing  Pt's son cooks and grocery shops  Pt's son is not in room at time of nutrition assessment  \"Stroke Nutrition Therapy\" handout provided  Pt is agreeable to passing along education material to his son  RD to follow  Please see full nutrition assessment in flow sheets for additional detail    "

## 2023-05-23 NOTE — OCCUPATIONAL THERAPY NOTE
"Occupational Therapy Evaluation     05/23/23 0827   OT Last Visit   OT Visit Date 05/23/23   Note Type   Note type Evaluation   Pain Assessment   Pain Assessment Tool 0-10   Pain Score No Pain   Restrictions/Precautions   Weight Bearing Precautions Per Order No   Other Precautions Chair Alarm; Bed Alarm;Telemetry; Fall Risk;Hard of hearing;Cognitive  (suprapubic catheter)   Home Living   Type of [address] Two level;Performs ADLs on one level; Able to live on main level with bedroom/bathroom  (0 ARIAN)   Bathroom Shower/Tub Walk-in shower   Bathroom Toilet Standard   Bathroom Equipment Tub transfer bench   [address]  (No AD at baseline )   Prior Function   Level of [place] Independent with ADLs; Independent with functional mobility; Needs assistance with IADLS  (Son)   Lives With Son   Receives Help From Family  (son)   IADLs Family/Friend/Other provides transportation; Family/Friend/Other provides medication management; Family/Friend/Other provides meals  (pt prepares own breakfast and lunch)   Falls in the last 6 months 1 to 4  (1 fall)   Vocational Retired   Comments Prefers to be called \"[place]\"   ADL   Grooming Assistance 6  Modified Independent   UB Bathing Assistance 5  Supervision/Setup   LB Bathing Assistance 5  Supervision/Setup   UB Dressing Assistance 5  Supervision/Setup   LB Dressing Assistance 5  [address]  5  Supervision/Setup   Functional Assistance 5  Supervision/Setup   Bed Mobility   Supine to Sit 5  Supervision   Additional items Assist x 1;Bedrails; Increased time required;HOB elevated;Verbal cues   Additional Comments Pt remained OOB in recliner upon conclusion   Transfers   Sit to Stand 5  Supervision   Additional items Assist x 1;Bedrails;Verbal cues; Increased time required   Stand to Sit 5  Supervision   Additional items Assist x 1; Armrests; Increased time required;Verbal cues   Stand pivot   (CGA) " Additional items Assist x 1; Increased time required;Verbal cues  (RW)   Additional Comments BPs taken during session:  Date/Time Temp Pulse Resp BP MAP (mmHg) SpO2 O2 Device Patient Position - Orthostatic VS   05/23/23 08:44:53 -- 72 -- 116/61 79 95 % -- Standing - Orthostatic VS   05/23/23 08:41:47 -- 71 -- 109/67 81 95 % -- Sitting - Orthostatic VS   05/23/23 08:39:44 -- 68 -- 114/66 82 95 % -- Lying - Orthostatic VS      Balance   Static Sitting Good   Dynamic Sitting Fair +   Static Standing Fair   Dynamic Standing Fair -   Ambulatory Fair -   Activity Tolerance   Activity Tolerance Patient limited by fatigue  (Pt limited by cognitive status )   Nurse Made Aware JERED Montemayor   RUE Assessment   RUE Assessment WFL   LUE Assessment   LUE Assessment WFL   Hand Function   Gross Motor Coordination Functional   Fine Motor Coordination Functional   Sensation   Light Touch No apparent deficits   Vision-Basic Assessment   Current Vision Wears contacts   Patient Visual Report   (Pt reported blurry vision)   Vision - Complex Assessment   Ocular Range of Motion Intact   Tracking Intact   Visual Fields   (intact)   Perception   Inattention/Neglect Appears intact   Cognition   Overall Cognitive Status Impaired   Arousal/Participation Responsive; Cooperative   Attention Attends with cues to redirect   Orientation Level Oriented to person;Oriented to time;Oriented to situation;Disoriented to place   Memory Decreased short term memory;Decreased recall of precautions   Following Commands Follows one step commands without difficulty   Assessment   Limitation Decreased ADL status; Decreased Safe judgement during ADL;Decreased cognition;Decreased high-level ADLs; Decreased self-care trans   Prognosis Good   Assessment Pt is a 81 y o  male seen for OT evaluation s/p admit to [place] [address] on 5/22/2023 w/ CVA (cerebral vascular accident) ([address] )   Prior to admission pt was I with ADLs and functional mobility, and received assistance with IADLs, meal prep, med management, and driving  Comorbidities impacting pt's functional performance at time of assessment include incomplete bladder emptying, CKD, and syncope  Current personal factors limiting pt's occupational performance include difficulty performing ADLS, difficulty performing IADLS , limited insight into deficits and decreased initiation and engagement   Current deficits impacting occupational performance include impaired attention, impaired initiation, and impaired memory  Pt to benefit from continued skilled OT tx during this hospital stay to address deficits listed above  Areas of occupational performance to address include bathing/shower, toilet hygiene, dressing, functional mobility and clothing management  From an OT standpoint, recommendation at time of d/c would be Home OT  Goals   Patient Goals to feel better   Plan   Treatment Interventions ADL retraining;Functional transfer training;Cognitive reorientation; Activityengagement; Endurance training;Energy conservation;Patient/family training   Goal Expiration Date 06/02/23   OT Treatment Day 0   OT Frequency 3-5x/wk   Recommendation   OT Discharge Recommendation Home with home health rehabilitation   Additional Comments  The patient's raw score on the AM-PAC Daily Activity Inpatient Short Form is 19  A raw score of greater than or equal to 19 suggests the patient may benefit from discharge to post-acute rehabilitation services  Please refer to the recommendation of the Occupational Therapist for safe discharge planning  Additional Comments 2 Coevaluation with PT due to pt comorbidities and level of function     AM-PAC Daily Activity Inpatient   Lower Body Dressing 3   Bathing 3   Toileting 3   Upper Body Dressing 3   Grooming 3   Eating 4   Daily Activity Raw Score 19   Daily Activity Standardized Score (Calc for Raw Score >=11) 40 22   AM-PAC Applied Cognition Inpatient   Following a Speech/Presentation 3   Understanding Ordinary Conversation 3   Taking Medications 2   Remembering Where Things Are Placed or Put Away 2   Remembering List of 4-5 Errands 2   Taking Care of Complicated Tasks 2   Applied Cognition Raw Score 14   Applied Cognition Standardized Score 32 02     GOALS    STG  Pt will achieve the following goals within 5 days    Pt will complete grooming with I for increased independence with self care tasks  Pt will complete ADL transfers with I for increased independence with ADLs/ meaningful tasks  Pt will complete UB ADLs including bathing and dressing with I to promote increased independence with self care tasks  Pt will complete LB ADLs including bathing and dressing with I using AE prn to promote increased independence with self care tasks  Pt will complete toileting with I for clothing management and hygiene to promote independence with self care tasks  Pt will increase static stand balance to F+ and dynamic stand balance to F for increased safety with standing functional tasks  Pt will increase stand tolerance to 3 min for sustained participation in standing functional tasks  Pt will participate in 10m UE therex to increase overall stamina/activity tolerance for purposeful tasks  Pt will complete bed mobility with I for increased independence to manage own comfort and participate in EOB & OOB purposeful tasks  LTG  Pt will achieve the following goals within 10 days    Pt will increase static stand balance to G and dynamic stand balance to F+ for increased safety with standing functional tasks  Pt will increase stand tolerance to 5 min for sustained participation in standing functional tasks  Pt will participate in 15m UE therex to increase overall stamina/activity tolerance for purposeful tasks      PEREZ Mahmood

## 2023-05-23 NOTE — TELEMEDICINE
TeleConsultation - Neurology   Frannie Carbajal 81 y o  male MRN: [id number]  Unit/Bed#: -01 Encounter: [id number]        REQUIRED DOCUMENTATION:     1  This service was provided via Telemedicine  2  Provider located at [facility]  3  TeleMed provider: Tierney Yepez MD   4  Identify all parties in room with patient during tele consult:  Patient only  5  Patient was then informed that this was a Telemedicine visit and that the exam was being conducted confidentially over secure lines  My office door was closed  No one else was in the room  Patient acknowledged consent and understanding of privacy and security of the Telemedicine visit, and gave us permission to have the assistant stay in the room in order to assist with the history and to conduct the exam   I informed the patient that I have reviewed their record in Epic and presented the opportunity for them to ask any questions regarding the visit today  The patient agreed to participate  Assessment/Plan   Concern for late acute/early subacute cortical infarcts although unusual to have had transient left sided symptoms  cta head/neck carefully reviewed and no significant atherosclerosis intra/extracranially on the right side  No mention of headache or recent infection  No significant metabolic derangements other than known ckd related bun/creatinine elevateion appears at baseline  Also unclear what to make of the ct head findings given that patient presented within window  Either ct reflects artifact or those are recent strokes that are asymptomatic and last night he had some additional ones where symptoms were dissipating  Although in sinus rhythm, PAF would be in differential  Unlikely atheroembolic if indeed embolic appearing strokes present as no significant athero on cta head/neck as stated  orthostats appear negative as well  Unclear if he had a syncopal event possibly cardiogenic then the symptoms after were in context of cerebrohypoperfusion  No hx of seizures or a postictal appearance  Per patient he feels like his normal self and per my video evaluation no obvious focal deficits  Continue stroke pathway  sbp 140-200  Tele  2-d echo  Pt/ot eval  Mri brain w/o contrast  Continue aspirin, statin  TSH, B12, Hba1c  outpt neurocognitive asseesment  outpt cardiac monitoring          History of Present Illness     Reason for Consult / Principal Problem: eval for acute cva    HPI: Duarte Nieves is a 81 y o   male who presented as a stroke alert yesterday after reportedly having a syncopal episode followed by left sided weakness and dysarthria, facial asymmetry, possible aphasia  Patient states he came into home from being outside doing yard work and was walking into the kitchen felt really light headed and was going to get a drink from the fridge where he apparently lost consciousness  He says he fell against a wall and his son came over and helped him sit down  He says at some point he noticed left sided weakness not sure exactly when  He says people mentioned he was slurring but didn't notice this himself  He doesn't recall palpitations or prior hx of syncope/presyncope  Symptoms were improving in the ED therefore iv tnk not given also concern of a left sided hypoattentuation in the left postcentral gyrus and left temporal occipital regions on the ct head  cta with no lvo  See Dr Mahi Cancino note yesterday for details  Pt has a hx for prior stroke and memory issues  It does not appear that he is on antiplatelet therapy at home  No leukocytosis  Triglycerides 100, ldl 61       Inpatient consult to Neurology  Consult performed by: Sean Alba MD  Consult ordered by: Christie Nava PA-C           Review of Systems   Per 12 point review in addition to hpi, memory issues, urinary retention at times, hearing difficulties, rest negative    Historical Information   Past Medical History:   Diagnosis Date   • Hypertension    • Urinary retention "    Past Surgical History:   Procedure Laterality Date   • CATARACT EXTRACTION     • HERNIA REPAIR     • HIATAL HERNIA REPAIR     • NM TRURL ELECTROSURG RESCJ PROSTATE BLEED COMPLETE N/A 7/22/2021    Procedure: TRANSURETHRAL RESECTION OF PROSTATE (TURP), cystoscopy;  Surgeon: Gudelia Greene MD;  Location: [facility];  Service: Urology   • SHOULDER SURGERY Left    • SUPRAPUBIC TUBE PLACEMENT N/A 7/22/2021    Procedure: INSERTION SUPRAPUBIC CATHETER PERCUTANEOUS;  Surgeon: Gudelia Greene MD;  Location: [facility];  Service: Urology     Social History   Social History     Substance and Sexual Activity   Alcohol Use Never     Social History     Substance and Sexual Activity   Drug Use Never     E-Cigarette/Vaping   • E-Cigarette Use Never User      E-Cigarette/Vaping Substances   • Nicotine No    • THC No    • CBD No    • Flavoring No    • Other No    • Unknown No      Social History     Tobacco Use   Smoking Status Never   Smokeless Tobacco Current   • Types: Chew     Family History: non-contributory      Meds/Allergies   all current active meds have been reviewed    No Known Allergies    Objective   Vitals:Blood pressure 116/61, pulse 72, temperature 98 °F (36 7 °C), temperature source Oral, resp  rate 18, height 5' 4\" (1 626 m), weight 80 6 kg (177 lb 11 1 oz), SpO2 95 %  ,Body mass index is 30 5 kg/m²  Physical Exam   General: no acute distress  HEENT: atraumatic, normocephalic      Neurologic Exam   MS: alert and orientedX3  Attention, concentration intact  No expressive/receptive aphasia  CN 2-12 intact except visual fields not assessed  Motor: no drift  At least 3 power ue/le bilat  No involuntary movements rest/activation  Additional practitioner not present for individual muscle testing  Sensory: light touch intact ue/le bilat  Coordination: finger to nose, heel to shin no dysmetria or ataxia ue/le bilat        Lab Results: I have personally reviewed pertinent reports      Imaging Studies: I have personally " reviewed pertinent films in PACS  EKG, Pathology, and Other Studies: I have personally reviewed pertinent films in PACS

## 2023-05-23 NOTE — ASSESSMENT & PLAN NOTE
· Syncopal event approximately 30 mins to 1 hour following working in the yard  · No further events during admission  · Orthostatics negative  · CT C/A/P (5/22): No acute intrathoracic process  Chronic urinary bladder wall thickening with suprapubic catheter in place and multiple bladder calcifications  These findings likely relate to chronic obstruction although urinary bladder infection cannot be excluded although the appearance is similar to the prior study  Simple renal cysts     · Outpatient Cardiology follow-up recommended as above

## 2023-05-23 NOTE — ASSESSMENT & PLAN NOTE
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

## 2023-05-23 NOTE — ASSESSMENT & PLAN NOTE
Lab Results   Component Value Date    EGFR 38 05/22/2023    EGFR 34 04/13/2022    EGFR 33 06/28/2021    CREATININE 1 65 (H) 05/22/2023    CREATININE 1 81 (H) 04/13/2022    CREATININE 1 86 (H) 06/28/2021     Creatinine appears to be slightly better than baseline, will continue to monitor with repeat labs in a m

## 2023-05-23 NOTE — CASE MANAGEMENT
Case Management Discharge Planning Note    Patient name Marge [facility]  Location /-17 MRN [id number]  : 1940 Date 2023       Current Admission Date: 2023  Current Admission Diagnosis:CVA (cerebral vascular accident) [facility])   Patient Active Problem List    Diagnosis Date Noted   • Bilateral sensorineural hearing loss 2023   • Mild cognitive impairment 2023   • Osteoarthritis 2023   • Stage 4 chronic kidney disease ([facility] [address] ) 2023   • Urinary retention 2023   • CVA (cerebral vascular accident) ([facility] [address] ) 2023   • Syncope 2023   • Obstructive uropathy 06/15/2021   • Hematuria 2021   • Benign prostatic hyperplasia with incomplete bladder emptying 2021   • Essential hypertension 2021      LOS (days): 0  Geometric Mean LOS (GMLOS) (days):   Days to GMLOS:     OBJECTIVE:            Current admission status: Observation   Preferred Pharmacy:   [facility] [address], [address] [facility] [address] [place] [id number]  Phone: [phone] Fax: [phone]    Primary Care Provider: Melvin Cunningham MD    Primary Insurance: [address] [facility]  Secondary Insurance: MEDICARE    DISCHARGE DETAILS:    Requested  [facility] [address]         Is the patient interested in [address] at discharge?: Yes  [address] requested[de-identified] Occupational Therapy, Physical [address] Name[de-identified]  [facility] Provider[de-identified] PCP  Home Health Services Needed[de-identified] Evaluate Functional Status and Safety, Gait/ADL Training, Strengthening/Theraputic Exercises to Improve Function  Supporting Clincal Findings[de-identified] Fatigues Easliy in [employer], Limited Endurance    Treatment Team Recommendation: Home with  [facility]  Discharge Destination Plan[de-identified] Home with  [facility]

## 2023-05-23 NOTE — NURSING NOTE
Patient awake and alert  Neuro check intact, no weakness or facial droop, speech clear  No complaints of pain    Call bell in reach

## 2023-05-23 NOTE — QUICK NOTE
Stroke alert called at 7:27PM  Neurology response at 7:27PM    Brief HPI: 81-year-old gentleman with a medical history significant for prior stroke with reported deficits of memory problems, cognitive impairment hypertension, and urinary retention who presented to the ED after a syncopal episode  History is a little unclear  Patient reportedly had a syncopal episode and was thought to have left-sided weakness following the incident  In the ED, there may have been some degree of dysarthria, facial asymmetry and aphasia as well  Symptoms were rapidly resolving on reevaluation    Last known well: 645PM  NIHSS 5 initially, but on re-eval 3    CTH - no evidence of acute hemorrhage  Likely subacute infarct in the left temporal occipital region as well as left postcentral gyrus  6 mm soft tissue nodular projection into the left frontal horn of the lateral ventricle  CTA  -no evidence of large vessel occlusion    IV thrombolysis was not given due to resolving symptoms, low NIHSS  Recommend loading aspirin 325 mg times once, 81 mg daily  Goal map greater than 100, SBP less than 210  Obtain MRI brain with and without contrast  Admit to stroke pathway    I discussed this case with the managing team from a remote location  I did not personally see or examine this patient   I have provided limited recommendations as above, but ultimate patient disposition as per managing team

## 2023-05-23 NOTE — PHYSICAL THERAPY NOTE
Physical Therapy Evaluation     Patient's Name: Jeff Ramos    Admitting Diagnosis  Syncope [R55]  CVA (cerebral vascular accident) [facility]) [I63 9]    Problem List  Patient Active Problem List   Diagnosis    Benign prostatic hyperplasia with incomplete bladder emptying    Essential hypertension    Hematuria    Obstructive uropathy    Bilateral sensorineural hearing loss    Mild cognitive impairment    Osteoarthritis    Stage 4 chronic kidney disease ([facility] [address] )    Urinary retention    CVA (cerebral vascular accident) ([facility] [address] )    Syncope       Past Medical History  Past Medical History:   Diagnosis Date    Hypertension     Urinary retention        Past Surgical History  Past Surgical History:   Procedure Laterality Date    CATARACT EXTRACTION      HERNIA REPAIR      HIATAL HERNIA REPAIR      NV TRURL ELECTROSURG RESCJ PROSTATE BLEED COMPLETE N/A 7/22/2021    Procedure: TRANSURETHRAL RESECTION OF PROSTATE (TURP), cystoscopy;  Surgeon: Shaneka Grey MD;  Location: [facility];  Service: Urology    SHOULDER SURGERY Left     SUPRAPUBIC TUBE PLACEMENT N/A 7/22/2021    Procedure: INSERTION SUPRAPUBIC CATHETER PERCUTANEOUS;  Surgeon: Shaneka Grey MD;  Location: [facility];  Service: Urology        05/23/23 0842   PT Last Visit   PT Visit Date 05/23/23   Note Type   Note type Evaluation   Pain Assessment   Pain Assessment Tool 0-10   Pain Score No Pain  (denies)   Restrictions/Precautions   Weight Bearing Precautions Per Order No   Other Precautions Chair Alarm; Bed Alarm;Telemetry; Fall Risk;Hard of hearing  (chronic suprapubic catheter)   Home Living   Type of [address] [place] [address] Two level;Performs ADLs on one level; Able to live on main level with bedroom/bathroom   Bathroom Shower/Tub Walk-in shower   Bathroom Toilet Standard   Bathroom Equipment Tub transfer bench   [address]  (no prior usage)   Prior Function   Level of [place] Independent with ADLs; Independent with functional "mobility; Needs assistance with IADLS   Lives With Asha Tidwell Help From Family   IADLs Family/Friend/Other provides transportation; Family/Friend/Other provides meals; Family/Friend/Other provides medication management  (patient prepares own breakfast and lunch)   Falls in the last 6 months 1 to 4  (1 prior to arrival, \"not that I recall\" when asked about other falls)   Vocational Retired   Comments prefers \"Bud\"   General   Additional Pertinent History Lola OT and Lili OTS present for co-assessment due to medical complexity, required skilled interventions of 2 clinicians for care delivery  Family/Caregiver Present No   Cognition   Overall Cognitive Status Impaired   Arousal/Participation Alert   Orientation Level Oriented to person;Oriented to place;Oriented to time;Disoriented to situation   Memory Decreased short term memory   Following Commands Follows one step commands without difficulty   Comments [place] was agreeable to PT assessment  RLE Assessment   RLE Assessment X  (3+/5 gross musculature)   LLE Assessment   LLE Assessment X  (3+/5 gross musculature)   Vision-Basic Assessment   Current Vision Wears contacts   Visual History Other (Comment)  (denies acute visual changes)   Vestibular   Spontaneous Nystagmus (-) no evidence of nystagmus at rest in room light   Gaze Holding Nystagmus (-) no evidence of nystagmus   Coordination   Movements are Fluid and Coordinated 1   Sharp/Dull   RLE Sharp/Dull Grossly intact   LLE Sharp/Dull Grossly intact   Bed Mobility   Supine to Sit 5  Supervision   Additional items Assist x 1;HOB elevated; Bedrails; Increased time required;Verbal cues   Sit to Supine   (DNT as Darin was sitting out of bed on the recliner upon conclusion )   Additional Comments Supine /66->sitting edge of bed 109/67->standing 116/61  Transfers   Sit to Stand 5  Supervision   Additional items Assist x 1;Bedrails; Increased time required;Verbal cues   Stand to Sit 5  Supervision   Additional items " Assist x 1;Bedrails; Increased time required;Verbal cues   Stand pivot   (CGA)   Additional items Assist x 1; Increased time required;Verbal cues   Additional Comments Verbal cues for proper BUE placement with transitional movements, safety while turning  Bud denied lightheadedness/dizziness with transitional movements  Ambulation/Elevation   Gait pattern Improper Weight shift; Forward Flexion; Short stride   Gait Assistance   (CGA)   Additional items Assist x 1; Tactile cues; Verbal cues   Assistive Device Rolling walker   Distance 5 feet   Stair Management Assistance Not tested   Ambulation/Elevation Additional Comments Verbal cues for base of support widening for stabilization, safety while turning  Balance   Static Sitting Good   Dynamic Sitting Fair +   Static Standing Fair   Dynamic Standing Fair -   Ambulatory Fair -   Endurance Deficit   Endurance Deficit Yes   Activity Tolerance   Activity Tolerance Patient limited by fatigue   Medical Staff Made Aware Yes, CM and Dr Cosme Hernandez was informed of d/c disposition recommendation  Nurse Made Aware Yes, Maria Antonia Peters RN was informed of assessment outcome  Assessment   Prognosis Good   Problem List Decreased strength;Decreased endurance; Impaired balance;Decreased mobility; Decreased cognition; Impaired judgement; Impaired hearing   Assessment Pt is 81 y o  male seen for PT evaluation s/p admit to [facility] on 5/22/2023 w/ CVA (cerebral vascular accident) ([facility] [address] )  PT consulted to assess pt's functional mobility and d/c needs  Order placed for PT eval and tx, w/ up and OOB as tolerated order  Comorbidities affecting pt's physical performance at time of assessment include: weakness, CVA,stage 4 CKD,syncope,HTN,B sensorineural hearing loss, OA,syncope   PTA, pt was independent w/ all functional mobility w/ o AD utilization   Personal factors affecting pt at time of IE include: communication issues, inability to ambulate household distances, inability to navigate community distances, inability to navigate level surfaces w/o external assistance, unable to perform dynamic tasks in community, positive fall history, hearing impairments, limited insight into impairments and inability to perform ADLs  Please find objective findings from PT assessment regarding body systems outlined above with impairments and limitations including weakness, impaired balance, decreased endurance, impaired coordination, gait deviations, decreased activity tolerance, decreased functional mobility tolerance, decreased safety awareness, fall risk and decreased cognition  From PT/mobility standpoint, recommendation at time of d/c would be home with home health rehabilitation pending progress in order to facilitate return to PLOF  Goals   Patient Goals to get home soon   LTG Expiration Date 06/02/23   Long Term Goal #1 1 )Patient will complete bed mobility modified I for decrease need for caregiver assistance, decrease burden of care  2 ) Patient will complete transfers modified I  to decrease risk of falls, facilitate upright standing posture  3 ) BLE strength to greater than/equal to 4/5 gross musculature to increase ability to safely transfer, control descent to chair  4 ) Patient will exhibit increase dynamic standing to Good 4-5 minutes without LOB modified I to improve activity tolerance  5 ) Patient will exhibit increase dynamic ambulatory balance to Fair > 50 feet w/AD  Modified I to improve ability to mobilize to toilet, chair and decrease risk for additional medical complications  6 ) Patient will exhibit good self monitoring and ability to follow 2 step commands to increase complexity of tasks and resume ADL's without LOB  PT Treatment Day 0   Plan   Treatment/Interventions Functional transfer training;LE strengthening/ROM; Therapeutic exercise; Endurance training;Cognitive reorientation;Patient/family training;Equipment eval/education; Bed mobility;Gait training;Spoke to nursing;Spoke to case management;Spoke to MD   PT Frequency 3-5x/wk   Recommendation   PT Discharge Recommendation Home with home health rehabilitation   Equipment Recommended [address] [facility] Recommended Wheeled walker   Change/add to [employer]? No   Additional Comments Upon conclusion, Darin was sitting out of bed on the recliner  The chair alarm was engaged and all of his needs were within reach     AM-PAC Basic Mobility Inpatient   Turning in Flat Bed Without Bedrails 3   Lying on Back to Sitting on Edge of Flat Bed Without Bedrails 3   Moving Bed to Chair 3   Standing Up From Chair Using Arms 3   Walk in Room 3   Climb 3-5 Stairs With Railing 3   Basic Mobility Inpatient Raw Score 18   Basic Mobility Standardized Score 41 05   Highest Level Of Mobility   JH-HLM Goal 6: Walk 10 steps or more   JH-HLM Achieved 5: Stand (1 or more minutes)     History/Personal Factors/Comorbidities: weakness, CVA,stage 4 CKD,syncope,HTN,B sensorineural hearing loss, OA,syncope    # of body structures/limitations: muscle weakness, activity intolerance,decreased endurance, impaired balance, gait deviations,impaired cognition    Clinical presentation: unstable as seen in sudden symptomatic onset prior to arrival, impaired cognition, hearing impairment, fall risk    Initial Assessment Time: [id number]    Dina Serrato, PT

## 2023-05-23 NOTE — PLAN OF CARE
Problem: Neurological Deficit  Goal: Neurological status is stable or improving  Description: Interventions:  - Monitor and assess patient's level of consciousness, motor function, sensory function, and level of assistance needed for ADLs  - Monitor and report changes from baseline  Collaborate with interdisciplinary team to initiate plan and implement interventions as ordered  - Provide and maintain a safe environment  - Consider seizure precautions  - Consider fall precautions  - Consider aspiration precautions  - Consider bleeding precautions  Outcome: Progressing     Problem: Communication Impairment  Goal: Ability to express needs and understand communication  Description: Assess patient's communication skills and ability to understand information  Patient will demonstrate use of effective communication techniques, alternative methods of communication and understanding even if not able to speak  - Encourage communication and provide alternate methods of communication as needed  - Collaborate with case management/ for discharge needs  - Include patient/family/caregiver in decisions related to communication    Outcome: Progressing

## 2023-05-23 NOTE — PLAN OF CARE
Problem: PAIN - ADULT  Goal: Verbalizes/displays adequate comfort level or baseline comfort level  Description: Interventions:  - Encourage patient to monitor pain and request assistance  - Assess pain using appropriate pain scale  - Administer analgesics based on type and severity of pain and evaluate response  - Implement non-pharmacological measures as appropriate and evaluate response  - Consider cultural and social influences on pain and pain management  - Notify physician/advanced practitioner if interventions unsuccessful or patient reports new pain  Outcome: Progressing     Problem: Knowledge Deficit  Goal: Patient/family/caregiver demonstrates understanding of disease process, treatment plan, medications, and discharge instructions  Description: Complete learning assessment and assess knowledge base  Interventions:  - Provide teaching at level of understanding  - Provide teaching via preferred learning methods  Outcome: Progressing     Problem: Neurological Deficit  Goal: Neurological status is stable or improving  Description: Interventions:  - Monitor and assess patient's level of consciousness, motor function, sensory function, and level of assistance needed for ADLs  - Monitor and report changes from baseline  Collaborate with interdisciplinary team to initiate plan and implement interventions as ordered  - Provide and maintain a safe environment  - Consider seizure precautions  - Consider fall precautions  - Consider aspiration precautions  - Consider bleeding precautions  Outcome: Progressing     Problem: Communication Impairment  Goal: Ability to express needs and understand communication  Description: Assess patient's communication skills and ability to understand information  Patient will demonstrate use of effective communication techniques, alternative methods of communication and understanding even if not able to speak       - Encourage communication and provide alternate methods of communication as needed  - Collaborate with case management/ for discharge needs  - Include patient/family/caregiver in decisions related to communication    Outcome: Progressing

## 2023-05-24 LAB — VIT B12 SERPL-MCNC: 400 PG/ML (ref 180–914)

## 2023-05-26 ENCOUNTER — PROCEDURE VISIT (OUTPATIENT)
Dept: UROLOGY | Facility: CLINIC | Age: 83
End: 2023-05-26

## 2023-05-26 VITALS
BODY MASS INDEX: 29.22 KG/M2 | HEIGHT: 65 IN | HEART RATE: 64 BPM | DIASTOLIC BLOOD PRESSURE: 80 MMHG | SYSTOLIC BLOOD PRESSURE: 116 MMHG | WEIGHT: 175.4 LBS

## 2023-05-26 DIAGNOSIS — N40.1 BPH WITH OBSTRUCTION/LOWER URINARY TRACT SYMPTOMS: ICD-10-CM

## 2023-05-26 DIAGNOSIS — N32.89 BLADDER SPASM: ICD-10-CM

## 2023-05-26 DIAGNOSIS — N13.8 BPH WITH OBSTRUCTION/LOWER URINARY TRACT SYMPTOMS: ICD-10-CM

## 2023-05-26 DIAGNOSIS — R33.9 URINARY RETENTION: Primary | ICD-10-CM

## 2023-05-26 NOTE — PROGRESS NOTES
I supervised the Advanced Practitioner  I reviewed the Advanced Practitioner note and agree      Brina Beck MD 05/26/23

## 2023-05-26 NOTE — PROGRESS NOTES
"5/26/2023    Sly Joe  1940  [id number]    Diagnosis: Urinary retention, BPH with obstruction, Bladder spasm   Chief Complaint    SPT change         Patient presents for routine SPT change managed by Dr Shawna Helm  Follow up in 4 weeks for next SPT change    Patient advised to keep a log of SPT residuals every day and bring results along with him to next visit  Son reports residuals are still largely elevated s/p patient attempting to urinate on his own  They wish to hold off on MD appointment at this time, as appointment was to re-evaluate residuals and discuss possible SPT removal  Son reports with residuals like they are now, SPT would not be able to be removed  Will re-evaluate next month when patient presents with list of residuals  Procedure: Suprapubic Tube Change         Cystostomy tube change     Date/Time 5/26/2023 10:30 AM     Performed by  Juan Goodman RN   Authorized by Jerrell Leo MD     Universal Protocol   Consent: Verbal consent obtained  Risks and benefits: risks, benefits and alternatives were discussed  Consent given by: patient  Patient understanding: patient states understanding of the procedure being performed  Patient identity confirmed: verbally with patient        Local anesthesia used: no     Anesthesia   Local anesthesia used: no     Sedation   Patient sedated: no        Specimen: no    Culture: no   Procedure Details   Patient tolerance: patient tolerated the procedure well with no immediate complications           Current catheter removed without difficulty after deflation of an intact balloon  Site prepped with Betadine, new 18F  suprapubic spt change via aseptic technique without incident, 10 ml balloon inflated with sterile water  Irrigated easily for straw-yellow return, mild spasm noted  Patient tolerated well  Catheter plug attached       Vitals:    05/26/23 1024   BP: 116/80   Pulse: 64   Weight: 79 6 kg (175 lb 6 4 oz)   Height: 5' 4 5\" (1 638 m) " Jah Colin, RN

## 2023-05-26 NOTE — ED PROVIDER NOTES
History  Chief Complaint   Patient presents with   • Syncope     Patient family comes in with family after syncopal episode  No headstrike, no BT      This an 81year old man who comes in with syncope and ams  Has been acting abnormally since a fall just prior to arrival  Seems confused with possible mild facial droop  Has chronic deficits which are not clear  Patient has baseline dementia  Son is primary caregiver and has trouble quantifying deficits and differences from baseline  Prior to Admission Medications   Prescriptions Last Dose Informant Patient Reported? Taking? cholecalciferol (VITAMIN D3) 1,000 units tablet  Self, Family Member Yes No   Sig: Take 2,000 Units by mouth daily   finasteride (PROSCAR) 5 mg tablet  Self, Family Member No No   Sig: Take 1 tablet (5 mg total) by mouth daily   oxybutynin (DITROPAN) 5 mg tablet   No No   Sig: Take 1 tablet (5 mg total) by mouth 2 (two) times a day as needed (bladder spasms)   tamsulosin (FLOMAX) 0 4 mg  Self, Family Member No No   Sig: Take 2 capsules (0 8 mg total) by mouth daily with dinner      Facility-Administered Medications: None       Past Medical History:   Diagnosis Date   • Hypertension    • Urinary retention        Past Surgical History:   Procedure Laterality Date   • CATARACT EXTRACTION     • HERNIA REPAIR     • HIATAL HERNIA REPAIR     • [facility] TRURL ELECTROSURG RESCJ PROSTATE BLEED COMPLETE N/A 7/22/2021    Procedure: TRANSURETHRAL RESECTION OF PROSTATE (TURP), cystoscopy;  Surgeon: Hansa Melgar MD;  Location: [facility];  Service: Urology   • SHOULDER SURGERY Left    • SUPRAPUBIC TUBE PLACEMENT N/A 7/22/2021    Procedure: INSERTION SUPRAPUBIC CATHETER PERCUTANEOUS;  Surgeon: Hansa Melgar MD;  Location: [facility];  Service: Urology       History reviewed  No pertinent family history  I have reviewed and agree with the history as documented      E-Cigarette/Vaping   • E-Cigarette Use Never User      E-Cigarette/Vaping Substances   • Nicotine No    • THC No    • CBD No    • Flavoring No    • Other No    • Unknown No      Social History     Tobacco Use   • Smoking status: Never   • Smokeless tobacco: Current     Types: Chew   Vaping Use   • Vaping Use: Never used   Substance Use Topics   • Alcohol use: Never   • Drug use: Never       Review of Systems   Unable to perform ROS: Dementia       Physical Exam  Physical Exam  Constitutional:       General: He is not in acute distress  Appearance: He is well-developed  HENT:      Head: Normocephalic and atraumatic  Right Ear: Tympanic membrane, ear canal and external ear normal       Left Ear: Tympanic membrane, ear canal and external ear normal       Nose: Nose normal       Mouth/Throat:      Mouth: Mucous membranes are moist    Eyes:      Conjunctiva/sclera: Conjunctivae normal       Pupils: Pupils are equal, round, and reactive to light  Cardiovascular:      Rate and Rhythm: Normal rate and regular rhythm  Heart sounds: Normal heart sounds  Pulmonary:      Effort: Pulmonary effort is normal  No respiratory distress  Breath sounds: Normal breath sounds  Abdominal:      General: Bowel sounds are normal  There is no distension  Palpations: Abdomen is soft  Tenderness: There is no abdominal tenderness  There is no guarding or rebound  Musculoskeletal:         General: Normal range of motion  Cervical back: Normal range of motion and neck supple  Skin:     General: Skin is warm and dry  Neurological:      Mental Status: He is alert  Comments: Some confusion about where he is, seems aware hes in a hospital  Limb ataxia on left, possible small left sided facial droop  Mild aphasia and dysarthria  Psychiatric:         Behavior: Behavior normal          Thought Content:  Thought content normal          Judgment: Judgment normal          Vital Signs  ED Triage Vitals   Temperature Pulse Respirations Blood Pressure SpO2   05/22/23 1923 05/22/23 1921 05/22/23 1921 05/22/23 1921 05/22/23 1921   97 8 °F (36 6 °C) 74 18 148/79 93 %      Temp Source Heart Rate Source Patient Position - Orthostatic VS BP Location FiO2 (%)   05/22/23 1928 05/22/23 1928 05/22/23 2130 05/22/23 2130 --   Temporal Monitor Lying Right arm       Pain Score       05/22/23 1921       No Pain           Vitals:    05/23/23 0844 05/23/23 0905 05/23/23 1305 05/23/23 1523   BP: 116/61 110/56 129/67 118/95   Pulse: 72 68 88 59   Patient Position - Orthostatic VS: Standing - Orthostatic VS Sitting Sitting Sitting         Visual Acuity  Visual Acuity    Flowsheet Row Most Recent Value   L Pupil Size (mm) 2   R Pupil Size (mm) 2   L Pupil Shape Round   R Pupil Shape Round          ED Medications  Medications   iohexol (OMNIPAQUE) 350 MG/ML injection (SINGLE-DOSE) 100 mL (100 mL Intravenous Given 5/22/23 1946)   iohexol (OMNIPAQUE) 350 MG/ML injection (SINGLE-DOSE) 1 mL (1 mL Intravenous Given 5/22/23 1951)   aspirin tablet 325 mg (325 mg Oral Given 5/22/23 2130)   Gadobutrol injection (SINGLE-DOSE) SOLN 8 mL (8 mL Intravenous Given 5/23/23 1303)       Diagnostic Studies  Results Reviewed     Procedure Component Value Units Date/Time    Lipid Panel with Direct LDL reflex [[id number]]  (Abnormal) Collected: 05/23/23 0534    Lab Status: Final result Specimen: Blood from Arm, Left Updated: 05/23/23 0639     Cholesterol 119 mg/dL      Triglycerides 100 mg/dL      HDL, Direct 38 mg/dL      LDL Calculated 61 mg/dL     Basic metabolic panel [[id number]]  (Abnormal) Collected: 05/23/23 0534    Lab Status: Final result Specimen: Blood from Arm, Left Updated: 05/23/23 8026     Sodium 138 mmol/L      Potassium 4 0 mmol/L      Chloride 108 mmol/L      CO2 23 mmol/L      ANION GAP 7 mmol/L      BUN 32 mg/dL      Creatinine 1 64 mg/dL      Glucose 86 mg/dL      Glucose, Fasting 86 mg/dL      Calcium 8 6 mg/dL      eGFR 38 ml/min/1 73sq m     Narrative:      [place] guidelines for Chronic Kidney Disease (CKD):   •  Stage 1 with normal or high GFR (GFR > 90 mL/min/1 73 square meters)  •  Stage 2 Mild CKD (GFR = 60-89 mL/min/1 73 square meters)  •  Stage 3A Moderate CKD (GFR = 45-59 mL/min/1 73 square meters)  •  Stage 3B Moderate CKD (GFR = 30-44 mL/min/1 73 square meters)  •  Stage 4 Severe CKD (GFR = 15-29 mL/min/1 73 square meters)  •  Stage 5 End Stage CKD (GFR <15 mL/min/1 73 square meters)  Note: GFR calculation is accurate only with a steady state creatinine    CBC (With Platelets) [[id number]]  (Abnormal) Collected: 05/23/23 0534    Lab Status: Final result Specimen: Blood from Arm, Left Updated: 05/23/23 0627     WBC 7 07 Thousand/uL      RBC 3 91 Million/uL      Hemoglobin 12 5 g/dL      Hematocrit 38 0 %      MCV 97 fL      MCH 32 0 pg      MCHC 32 9 g/dL      RDW 13 0 %      Platelets 232 Thousands/uL      MPV 9 5 fL     HS Troponin I 2hr [[id number]]  (Normal) Collected: 05/22/23 2330    Lab Status: Final result Specimen: Blood from Arm, Right Updated: 05/23/23 0010     hs TnI 2hr 29 ng/L      Delta 2hr hsTnI 15 ng/L     FLU/RSV/COVID - if FLU/RSV clinically relevant [[id number]]  (Normal) Collected: 05/22/23 1930    Lab Status: Final result Specimen: Nares from Nose Updated: 05/22/23 2016     SARS-CoV-2 Negative     INFLUENZA A PCR Negative     INFLUENZA B PCR Negative     RSV PCR Negative    Narrative:      FOR PEDIATRIC PATIENTS - copy/paste COVID Guidelines URL to browser: [email or web address]    SARS-CoV-2 assay is a Nucleic Acid Amplification assay intended for the  qualitative detection of nucleic acid from SARS-CoV-2 in nasopharyngeal  swabs  Results are for the presumptive identification of SARS-CoV-2 RNA  Positive results are indicative of infection with SARS-CoV-2, the virus  causing COVID-19, but do not rule out bacterial infection or co-infection  with other viruses   Laboratories within the [place] and its  territories are required to report all positive results to the appropriate  public health authorities  Negative results do not preclude SARS-CoV-2  infection and should not be used as the sole basis for treatment or other  patient management decisions  Negative results must be combined with  clinical observations, patient history, and epidemiological information  This test has not been FDA cleared or approved  This test has been authorized by FDA under an Emergency Use Authorization  (EUA)  This test is only authorized for the duration of time the  declaration that circumstances exist justifying the authorization of the  emergency use of an in vitro diagnostic tests for detection of SARS-CoV-2  virus and/or diagnosis of COVID-19 infection under section 564(b)(1) of  the Act, 21 U  S C  523MFW-6(T)(7), unless the authorization is terminated  or revoked sooner  The test has been validated but independent review by FDA  and CLIA is pending  Test performed using [employer] GeneXpert: This RT-PCR assay targets N2,  a region unique to SARS-CoV-2  A conserved region in the E-gene was chosen  for pan-Sarbecovirus detection which includes SARS-CoV-2  According to CMS-2020-01-R, this platform meets the definition of high-throughput technology      HS Troponin 0hr (reflex protocol) [[id number]]  (Normal) Collected: 05/22/23 1930    Lab Status: Final result Specimen: Blood from Arm, Left Updated: 05/22/23 1959     hs TnI 0hr 14 ng/L     Basic metabolic panel [[id number]]  (Abnormal) Collected: 05/22/23 1930    Lab Status: Final result Specimen: Blood from Arm, Left Updated: 05/22/23 1955     Sodium 138 mmol/L      Potassium 4 1 mmol/L      Chloride 106 mmol/L      CO2 24 mmol/L      ANION GAP 8 mmol/L      BUN 31 mg/dL      Creatinine 1 65 mg/dL      Glucose 127 mg/dL      Calcium 9 2 mg/dL      eGFR 38 ml/min/1 73sq m     Narrative:      [place] guidelines for Chronic Kidney Disease (CKD): •  Stage 1 with normal or high GFR (GFR > 90 mL/min/1 73 square meters)  •  Stage 2 Mild CKD (GFR = 60-89 mL/min/1 73 square meters)  •  Stage 3A Moderate CKD (GFR = 45-59 mL/min/1 73 square meters)  •  Stage 3B Moderate CKD (GFR = 30-44 mL/min/1 73 square meters)  •  Stage 4 Severe CKD (GFR = 15-29 mL/min/1 73 square meters)  •  Stage 5 End Stage CKD (GFR <15 mL/min/1 73 square meters)  Note: GFR calculation is accurate only with a steady state creatinine    Protime-INR [[id number]]  (Normal) Collected: 05/22/23 1930    Lab Status: Final result Specimen: Blood from Arm, Left Updated: 05/22/23 1948     Protime 13 3 seconds      INR 1 01    APTT [[id number]]  (Normal) Collected: 05/22/23 1930    Lab Status: Final result Specimen: Blood from Arm, Left Updated: 05/22/23 1948     PTT 28 seconds     CBC and Platelet [[id number]]  (Abnormal) Collected: 05/22/23 1930    Lab Status: Final result Specimen: Blood from Arm, Left Updated: 05/22/23 1934     WBC 7 85 Thousand/uL      RBC 4 31 Million/uL      Hemoglobin 13 9 g/dL      Hematocrit 41 8 %      MCV 97 fL      MCH 32 3 pg      MCHC 33 3 g/dL      RDW 12 8 %      Platelets 830 Thousands/uL      MPV 9 1 fL     Fingerstick Glucose (POCT) [[id number]]  (Normal) Collected: 05/22/23 1925    Lab Status: Final result Updated: 05/22/23 1927     POC Glucose 132 mg/dl                  MRI brain w wo contrast   Final Result by Arvin Osler, MD (05/23 1337)      No acute infarction or pathologic intracranial enhancement  Mild cerebral chronic microangiopathic changes  Chronic 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 and bilateral cerebellar hemispheres  Chronic frontotemporal predominant volume loss and ventriculomegaly  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  These have been stable since the MRI and CT scans from 2012        Workstation performed: [id number] - CT chest abdomen pelvis w contrast   Final Result by Daniel Pedersen MD (05/22 2027)         1  No acute intrathoracic process  2  Coronary vascular calcifications  3  Chronic urinary bladder wall thickening with suprapubic catheter in place and multiple bladder calcifications  These findings likely relate to chronic obstruction although urinary bladder infection cannot be excluded although the appearance is similar    to the prior study  4  Diverticulosis without evidence of diverticulitis  5  Simple renal cysts  No follow-up for these recommended  Workstation performed: [id number]         CTA stroke alert (head/neck)   Final Result by Regino Nguyễn MD (05/22 2004)      No evidence of hemodynamic significant stenosis, aneurysm or dissection  I personally discussed this study with Dr Ashley Altman on 5/22/2023 7:48 PM                               Workstation performed: [id number]         CT stroke alert brain   Final Result by Regino Nguyễn MD (05/22 1956)      No evidence of acute intracranial hemorrhage  Low-attenuation likely subacute infarct in the left temporal occipital watershed distribution  Left postcentral gyrus possible acute to subacute ischemia  6 mm soft tissue nodular projection into the left frontal horn lateral ventricle for which contrast-enhanced brain MRI is recommended for further evaluation           I personally discussed this study with Dr Colton Aparicio on 5/22/2023 7:48 PM             Workstation performed: [id number]                    Procedures  CriticalCare Time    Date/Time: 5/26/2023 5:11 PM    Performed by: Lelo Cano MD  Authorized by: Lelo Cano MD    Critical care provider statement:     Critical care time (minutes):  30    Critical care time was exclusive of:  Separately billable procedures and treating other patients and teaching time    Critical care was necessary to treat or prevent imminent or life-threatening deterioration of the following conditions:  CNS failure or compromise    Critical care was time spent personally by me on the following activities:  Blood draw for specimens, obtaining history from patient or surrogate, development of treatment plan with patient or surrogate, evaluation of patient's response to treatment, examination of patient, interpretation of cardiac output measurements, ordering and performing treatments and interventions, ordering and review of laboratory studies, ordering and review of radiographic studies, re-evaluation of patient's condition and review of old charts    I assumed direction of critical care for this patient from another provider in my specialty: no               ED Course                  Stroke Assessment     Row Name 05/22/23 1930             NIH Stroke Scale    Interval --      Level of Consciousness (1a ) 0      LOC Questions (1b ) 1      LOC Commands (1c ) 0      Best Gaze (2 ) 0      Visual (3 ) 0      Facial Palsy (4 ) 1      Motor Arm, Left (5a ) 0      Motor Arm, Right (5b ) 0      Motor Leg, Left (6a ) 0      Motor Leg, Right (6b ) 0      Limb Ataxia (7 ) 1      Sensory (8 ) 0      Best Language (9 ) 1      Dysarthria (10 ) 1      Extinction and Inattention (11 ) (Formerly Neglect) 0      Total 5                            SBIRT 22yo+    Flowsheet Row Most Recent Value   Initial Alcohol Screen: US AUDIT-C     1  How often do you have a drink containing alcohol? 0 Filed at: 05/22/2023 1922   2  How many drinks containing alcohol do you have on a typical day you are drinking? 0 Filed at: 05/22/2023 1922   3a  Male UNDER 65: How often do you have five or more drinks on one occasion? 0 Filed at: 05/22/2023 1922   3b  FEMALE Any Age, or MALE 65+: How often do you have 4 or more drinks on one occassion? 0 Filed at: 05/22/2023 1922   Audit-C Score 0 Filed at: 05/22/2023 4573   LUCY: How many times in the past year have you        Used an illegal drug or used a prescription medication for non-medical reasons? Never Filed at: 05/22/2023 [address] [place]  This is an 81year old man who appears to have had a recent stroke with new deficits  CT shows subacute and he is dramatically improving while in the ED  TPA withheld after discussing with patient and caregiver  Patient started on ASA per on call neurologist Dr Ching Gil recommendation  Both code stroke and trauma code called due to inability to articulate what happened with obvious trauma  Patient admitted for stroke pathway  Trauma Secondary exam performed (must be performed and documented on all traumas): GCS 14, full ROM of bilateral upper and lower extremities  Airway intact, bilateral breath sounds, palpable pulses  No active bleeding  No bony point tenderness in extremities, chest, abdomen or c/t,l spine  Pelvis stable  Fast or pelvic xray prior to ordering a CT a/p? yes  Stat portable CXR prior to going to CT chest? yes    Of note, C-spine clear after CT scans resulted  Fall, initial encounter: acute illness or injury  Syncope: acute illness or injury  Amount and/or Complexity of Data Reviewed  Labs: ordered  Radiology: ordered  Risk  OTC drugs  Prescription drug management  Decision regarding hospitalization  Disposition  Final diagnoses:   Syncope   Fall, initial encounter     Time reflects when diagnosis was documented in both MDM as applicable and the Disposition within this note     Time User Action Codes Description Comment    5/22/2023  7:27 PM Rupal Betancourt Add [R55] Syncope     5/22/2023  7:27 PM Rupal COLUNGAYNV] DUEX, initial encounter     5/22/2023  9:07 PM Samantha Crea Add [I63 9] CVA (cerebral vascular accident) [facility])     5/22/2023  9:13 PM Samantha Crea Modify [R55] Syncope     5/22/2023  9:13 PM Samantha Crea Modify [T87  MDTB] LANDRY, initial encounter     5/22/2023  9:13 PM Samantha Crea Modify [I63 9] CVA (cerebral vascular accident) [facility]) ED Disposition     ED Disposition   Admit    Condition   Stable    Date/Time   Mon May 22, 2023  9:01 PM    Comment   Case was discussed with eulalio and the patient's admission status was agreed to be Admission Status: observation status to the service of Dr Malaika Cameron   Follow-up Information     Follow up With Specialties Details Why [address] Follow up  [address]      Hetal Conrad MD Family Medicine Follow up in 1 week(s)  [address] [place] [address]      Haylie Norman MD Neurology Follow up in 1 month(s)  [place] [id number] [address] [facility]      Ayaka Goodman MD Cardiology Follow up in 2 week(s)  [facility] [place] [address] [facility]            Discharge Medication List as of 5/23/2023  4:03 PM      START taking these medications    Details   aspirin 81 mg chewable tablet Chew 1 tablet (81 mg total) daily Do not start before May 24, 2023 , Starting Wed 5/24/2023, Until Fri 6/23/2023, Normal      atorvastatin (LIPITOR) 40 mg tablet Take 1 tablet (40 mg total) by mouth every evening, Starting Tue 5/23/2023, Until Thu 6/22/2023, Normal         CONTINUE these medications which have NOT CHANGED    Details   cholecalciferol (VITAMIN D3) 1,000 units tablet Take 2,000 Units by mouth daily, Historical Med      finasteride (PROSCAR) 5 mg tablet Take 1 tablet (5 mg total) by mouth daily, Starting Tue 6/28/2022, Normal      oxybutynin (DITROPAN) 5 mg tablet Take 1 tablet (5 mg total) by mouth 2 (two) times a day as needed (bladder spasms), Starting Wed 2/22/2023, Normal      tamsulosin (FLOMAX) 0 4 mg Take 2 capsules (0 8 mg total) by mouth daily with dinner, Starting Tue 6/28/2022, Print             Outpatient Discharge Orders   Ambulatory Referral to Cardiology   Standing Status: Future Standing Exp   Date: 05/23/24 Ambulatory Referral to Neurology   Standing Status: Future Standing Exp  Date: 05/23/24      EXTERNAL: Ambulatory Referral to Home Health   Standing Status: Future Standing Exp   Date: 05/23/24      Discharge Diet     Activity as tolerated     Call provider for:  severe uncontrolled pain     Call provider for:  persistent dizziness or light-headedness     Call provider for:  difficulty breathing, headache or visual disturbances       PDMP Review       Value Time User    PDMP Reviewed  Yes 7/22/2021  1:52 PM Wayne Goel MD          ED Provider  Electronically Signed by           Cintia Joe MD  05/26/23 [id number]

## 2023-06-08 ENCOUNTER — TELEPHONE (OUTPATIENT)
Dept: NEUROLOGY | Facility: CLINIC | Age: 83
End: 2023-06-08

## 2023-06-08 NOTE — TELEPHONE ENCOUNTER
Patient admitted at [facility] 5/22-5/23, Neurology consulted via Telemedicine for Stroke  No scheduling instructions listed  Dr Danette Arreola- Can you please let us know what the instructions would be? Thank you!

## 2023-06-14 ENCOUNTER — OFFICE VISIT (OUTPATIENT)
Dept: INTERNAL MEDICINE CLINIC | Facility: CLINIC | Age: 83
End: 2023-06-14
Payer: MEDICARE

## 2023-06-14 VITALS
WEIGHT: 174.5 LBS | DIASTOLIC BLOOD PRESSURE: 58 MMHG | OXYGEN SATURATION: 95 % | HEART RATE: 76 BPM | TEMPERATURE: 99.4 F | HEIGHT: 65 IN | BODY MASS INDEX: 29.07 KG/M2 | SYSTOLIC BLOOD PRESSURE: 124 MMHG

## 2023-06-14 DIAGNOSIS — H90.3 BILATERAL SENSORINEURAL HEARING LOSS: ICD-10-CM

## 2023-06-14 DIAGNOSIS — K59.00 CONSTIPATION, UNSPECIFIED CONSTIPATION TYPE: ICD-10-CM

## 2023-06-14 DIAGNOSIS — E66.3 OVERWEIGHT (BMI 25.0-29.9): ICD-10-CM

## 2023-06-14 DIAGNOSIS — R55 SYNCOPE, UNSPECIFIED SYNCOPE TYPE: Primary | ICD-10-CM

## 2023-06-14 DIAGNOSIS — Z93.59 SUPRAPUBIC CATHETER (HCC): ICD-10-CM

## 2023-06-14 DIAGNOSIS — N40.1 BENIGN PROSTATIC HYPERPLASIA WITH INCOMPLETE BLADDER EMPTYING: ICD-10-CM

## 2023-06-14 DIAGNOSIS — Z87.891 FORMER SMOKER: ICD-10-CM

## 2023-06-14 DIAGNOSIS — R33.9 URINARY RETENTION: ICD-10-CM

## 2023-06-14 DIAGNOSIS — F01.A0 MILD VASCULAR DEMENTIA WITHOUT BEHAVIORAL DISTURBANCE, PSYCHOTIC DISTURBANCE, MOOD DISTURBANCE, OR ANXIETY (HCC): ICD-10-CM

## 2023-06-14 DIAGNOSIS — I63.9 CEREBROVASCULAR ACCIDENT (CVA), UNSPECIFIED MECHANISM (HCC): ICD-10-CM

## 2023-06-14 DIAGNOSIS — R55 SYNCOPE, UNSPECIFIED SYNCOPE TYPE: ICD-10-CM

## 2023-06-14 DIAGNOSIS — G31.84 MILD COGNITIVE IMPAIRMENT: ICD-10-CM

## 2023-06-14 DIAGNOSIS — N18.4 STAGE 4 CHRONIC KIDNEY DISEASE (HCC): ICD-10-CM

## 2023-06-14 DIAGNOSIS — Z72.0 CHEWS TOBACCO: ICD-10-CM

## 2023-06-14 DIAGNOSIS — R39.14 BENIGN PROSTATIC HYPERPLASIA WITH INCOMPLETE BLADDER EMPTYING: ICD-10-CM

## 2023-06-14 DIAGNOSIS — L60.2 LONG TOENAIL: ICD-10-CM

## 2023-06-14 DIAGNOSIS — M15.9 PRIMARY OSTEOARTHRITIS INVOLVING MULTIPLE JOINTS: ICD-10-CM

## 2023-06-14 DIAGNOSIS — J30.2 SEASONAL ALLERGIES: ICD-10-CM

## 2023-06-14 DIAGNOSIS — I10 ESSENTIAL HYPERTENSION: ICD-10-CM

## 2023-06-14 DIAGNOSIS — I63.9 CEREBROVASCULAR ACCIDENT (CVA), UNSPECIFIED MECHANISM (HCC): Primary | ICD-10-CM

## 2023-06-14 PROBLEM — C61 MALIGNANT NEOPLASM OF PROSTATE (HCC): Status: ACTIVE | Noted: 2023-06-14

## 2023-06-14 PROBLEM — D69.6 PLATELETS DECREASED (HCC): Status: ACTIVE | Noted: 2023-06-14

## 2023-06-14 PROBLEM — R31.9 HEMATURIA: Status: RESOLVED | Noted: 2021-05-25 | Resolved: 2023-06-14

## 2023-06-14 PROCEDURE — 99204 OFFICE O/P NEW MOD 45 MIN: CPT | Performed by: INTERNAL MEDICINE

## 2023-06-14 RX ORDER — POLYETHYLENE GLYCOL 3350 17 G/17G
17 POWDER, FOR SOLUTION ORAL DAILY
Qty: 850 G | Refills: 1 | Status: SHIPPED | OUTPATIENT
Start: 2023-06-14

## 2023-06-14 RX ORDER — DONEPEZIL HYDROCHLORIDE 5 MG/1
5 TABLET, FILM COATED ORAL
Qty: 90 TABLET | Refills: 1 | Status: SHIPPED | OUTPATIENT
Start: 2023-06-14

## 2023-06-14 RX ORDER — LEVOCETIRIZINE DIHYDROCHLORIDE 5 MG/1
5 TABLET, FILM COATED ORAL EVERY EVENING
Qty: 90 TABLET | Refills: 1 | Status: SHIPPED | OUTPATIENT
Start: 2023-06-14

## 2023-06-14 NOTE — PATIENT INSTRUCTIONS
Weight Management   AMBULATORY CARE:   Why it is important to manage your weight:  Being overweight increases your risk of health conditions such as heart disease, high blood pressure, type 2 diabetes, and certain types of cancer  It can also increase your risk for osteoarthritis, sleep apnea, and other respiratory problems  Aim for a slow, steady weight loss  Even a small amount of weight loss can lower your risk of health problems  Risks of being overweight:  Extra weight can cause many health problems, including the following:  • Diabetes (high blood sugar level)    • High blood pressure or high cholesterol    • Heart disease    • Stroke    • Gallbladder or liver disease    • Cancer of the colon, breast, prostate, liver, or kidney    • Sleep apnea    • Arthritis or gout    Screening  is done to check for health conditions before you have signs or symptoms  If you are 28to 79years old, your blood sugar level may be checked every 3 years for signs of prediabetes or diabetes  Your healthcare provider will check your blood pressure at each visit  High blood pressure can lead to a stroke or other problems  Your provider may check for signs of heart disease, cancer, or other health problems  How to lose weight safely:  A safe and healthy way to lose weight is to eat fewer calories and get regular exercise  • You can lose up about 1 pound a week by decreasing the number of calories you eat by 500 calories each day  You can decrease calories by eating smaller portion sizes or by cutting out high-calorie foods  Read labels to find out how many calories are in the foods you eat  • You can also burn calories with exercise such as walking, swimming, or biking  You will be more likely to keep weight off if you make these changes part of your lifestyle  Exercise at least 30 minutes per day on most days of the week   You can also fit in more physical activity by taking the stairs instead of the elevator or parking farther away from stores  Ask your healthcare provider about the best exercise plan for you  Healthy meal plan for weight management:  A healthy meal plan includes a variety of foods, contains fewer calories, and helps you stay healthy  A healthy meal plan includes the following:     • Eat whole-grain foods more often  A healthy meal plan should contain fiber  Fiber is the part of grains, fruits, and vegetables that is not broken down by your body  Whole-grain foods are healthy and provide extra fiber in your diet  Some examples of whole-grain foods are whole-wheat breads and pastas, oatmeal, brown rice, and bulgur  • Eat a variety of vegetables every day  Include dark, leafy greens such as spinach, kale, krissy greens, and mustard greens  Eat yellow and orange vegetables such as carrots, sweet potatoes, and winter squash  • Eat a variety of fruits every day  Choose fresh or canned fruit (canned in its own juice or light syrup) instead of juice  Fruit juice has very little or no fiber  • Eat low-fat dairy foods  Drink fat-free (skim) milk or 1% milk  Eat fat-free yogurt and low-fat cottage cheese  Try low-fat cheeses such as mozzarella and other reduced-fat cheeses  • Choose meat and other protein foods that are low in fat  Choose beans or other legumes such as split peas or lentils  Choose fish, skinless poultry (chicken or turkey), or lean cuts of red meat (beef or pork)  Before you cook meat or poultry, cut off any visible fat  • Use less fat and oil  Try baking foods instead of frying them  Add less fat, such as margarine, sour cream, regular salad dressing and mayonnaise to foods  Eat fewer high-fat foods  Some examples of high-fat foods include french fries, doughnuts, ice cream, and cakes  • Eat fewer sweets  Limit foods and drinks that are high in sugar  This includes candy, cookies, regular soda, and sweetened drinks  Ways to decrease calories:   • Eat smaller portions  ? Use a small plate with smaller servings  ? Do not eat second helpings  ? When you eat at a restaurant, ask for a box and place half of your meal in the box before you eat  ? Share an entrée with someone else  • Replace high-calorie snacks with healthy, low-calorie snacks  ? Choose fresh fruit, vegetables, fat-free rice cakes, or air-popped popcorn instead of potato chips, nuts, or chocolate  ? Choose water or calorie-free drinks instead of soda or sweetened drinks  • Do not shop for groceries when you are hungry  You may be more likely to make unhealthy food choices  Take a grocery list of healthy foods and shop after you have eaten  • Eat regular meals  Do not skip meals  Skipping meals can lead to overeating later in the day  This can make it harder for you to lose weight  Eat a healthy snack in place of a meal if you do not have time to eat a regular meal  Talk with a dietitian to help you create a meal plan and schedule that is right for you  Other things to consider as you try to lose weight:   • Be aware of situations that may give you the urge to overeat, such as eating while watching television  Find ways to avoid these situations  For example, read a book, go for a walk, or do crafts  • Meet with a weight loss support group or friends who are also trying to lose weight  This may help you stay motivated to continue working on your weight loss goals  © Copyright Shanel Oleary 2022 Information is for End User's use only and may not be sold, redistributed or otherwise used for commercial purposes  The above information is an  only  It is not intended as medical advice for individual conditions or treatments  Talk to your doctor, nurse or pharmacist before following any medical regimen to see if it is safe and effective for you  Low Fat Diet   AMBULATORY CARE:   A low-fat diet  is an eating plan that is low in total fat, unhealthy fat, and cholesterol   You may need to follow a low-fat diet if you have trouble digesting or absorbing fat  You may also need to follow this diet if you have high cholesterol  You can also lower your cholesterol by increasing the amount of fiber in your diet  Soluble fiber is a type of fiber that helps to decrease cholesterol levels  Different types of fat in food:   • Limit unhealthy fats  A diet that is high in cholesterol, saturated fat, and trans fat may cause unhealthy cholesterol levels  Unhealthy cholesterol levels increase your risk of heart disease  ? Cholesterol:  Limit intake of cholesterol to less than 200 mg per day  Cholesterol is found in meat, eggs, and dairy  ? Saturated fat:  Limit saturated fat to less than 7% of your total daily calories  Ask your dietitian how many calories you need each day  Saturated fat is found in butter, cheese, ice cream, whole milk, and palm oil  Saturated fat is also found in meat, such as beef, pork, chicken skin, and processed meats  Processed meats include sausage, hot dogs, and bologna  ? Trans fat:  Avoid trans fat as much as possible  Trans fat is used in fried and baked foods  Foods that say trans fat free on the label may still have up to 0 5 grams of trans fat per serving  • Include healthy fats  Replace foods that are high in saturated and trans fat with foods high in healthy fats  This may help to decrease high cholesterol levels  ? Monounsaturated fats: These are found in avocados, nuts, and vegetable oils, such as olive, canola, and sunflower oil  ? Polyunsaturated fats: These can be found in vegetable oils, such as soybean or corn oil  Omega-3 fats can help to decrease the risk of heart disease  Omega-3 fats are found in fish, such as salmon, herring, trout, and tuna  Omega-3 fats can also be found in plant foods, such as walnuts, flaxseed, soybeans, and canola oil  Foods to limit or avoid:   • Grains:      ?  Snacks that are made with partially hydrogenated oils, such as chips, regular crackers, and butter-flavored popcorn    ? High-fat baked goods, such as biscuits, croissants, doughnuts, pies, cookies, and pastries    • Dairy:      ? Whole milk, 2% milk, and yogurt and ice cream made with whole milk    ? Half and half creamer, heavy cream, and whipping cream    ? Cheese, cream cheese, and sour cream    • Meats and proteins:      ? High-fat cuts of meat (T-bone steak, regular hamburger, and ribs)    ? Fried meat, poultry (turkey and chicken), and fish    ? Poultry (chicken and turkey) with skin    ? Cold cuts (salami or bologna), hot dogs, ornelas, and sausage    ? Whole eggs and egg yolks    • Vegetables and fruits with added fat:      ? Fried vegetables or vegetables in butter or high-fat sauces, such as cream or cheese sauces    ? Fried fruit or fruit served with butter or cream    • Fats:      ? Butter, stick margarine, and shortening    ? Coconut, palm oil, and palm kernel oil    Foods to include:       • Grains:      ? Whole-grain breads, cereals, pasta, and brown rice    ? Low-fat crackers and pretzels    • Vegetables and fruits:      ? Fresh, frozen, or canned vegetables (no salt or low-sodium)    ? Fresh, frozen, dried, or canned fruit (canned in light syrup or fruit juice)    ? Avocado    • Low-fat dairy products:      ? Nonfat (skim) or 1% milk    ? Nonfat or low-fat cheese, yogurt, and cottage cheese    • Meats and proteins:      ? Chicken or turkey with no skin    ? Baked or broiled fish    ? Lean beef and pork (loin, round, extra lean hamburger)    ? Beans and peas, unsalted nuts, soy products    ? Egg whites and substitutes    ? Seeds and nuts    • Fats:      ? Unsaturated oil, such as canola, olive, peanut, soybean, or sunflower oil    ? Soft or liquid margarine and vegetable oil spread    ? Low-fat salad dressing  Other ways to decrease fat:   • Read food labels before you buy foods    Choose foods that have less than 30% of calories from fat  Choose low-fat or fat-free dairy products  Remember that fat free does not mean calorie free  These foods still contain calories, and too many calories can lead to weight gain  • Trim fat from meat and avoid fried food  Trim all visible fat from meat before you cook it  Remove the skin from poultry  Do not frankel meat, fish, or poultry  Bake, roast, boil, or broil these foods instead  Avoid fried foods  Eat a baked potato instead of [place] fries  Steam vegetables instead of sautéing them in butter  • Add less fat to foods  Use imitation ornelas bits on salads and baked potatoes instead of regular ornelas bits  Use fat-free or low-fat salad dressings instead of regular dressings  Use low-fat or nonfat butter-flavored topping instead of regular butter or margarine on popcorn and other foods  Ways to decrease fat in recipes:  Replace high-fat ingredients with low-fat or nonfat ones  This may cause baked goods to be drier than usual  You may need to use nonfat cooking spray on pans to prevent food from sticking  You also may need to change the amount of other ingredients, such as water, in the recipe  Try the following:  • Use low-fat or light margarine instead of regular margarine or shortening  • Use lean ground turkey breast or chicken, or lean ground beef (less than 5% fat) instead of hamburger  • Add 1 teaspoon of canola oil to 8 ounces of skim milk instead of using cream or half and half  • Use grated zucchini, carrots, or apples in breads instead of coconut  • Use blenderized, low-fat cottage cheese, plain tofu, or low-fat ricotta cheese instead of cream cheese  • Use 1 egg white and 1 teaspoon of canola oil, or use ¼ cup (2 ounces) of fat-free egg substitute instead of a whole egg  • Replace half of the oil that is called for in a recipe with applesauce when you bake  Use 3 tablespoons of cocoa powder and 1 tablespoon of canola oil instead of a square of baking chocolate      How to increase fiber:  Eat enough high-fiber foods to get 20 to 30 grams of fiber every day  Slowly increase your fiber intake to avoid stomach cramps, gas, and other problems  • Eat 3 ounces of whole-grain foods each day  An ounce is about 1 slice of bread  Eat whole-grain breads, such as whole-wheat bread  Whole wheat, whole-wheat flour, or other whole grains should be listed as the first ingredient on the food label  Replace white flour with whole-grain flour or use half of each in recipes  Whole-grain flour is heavier than white flour, so you may have to add more yeast or baking powder  • Eat a high-fiber cereal for breakfast   Oatmeal is a good source of soluble fiber  Look for cereals that have bran or fiber in the name  Choose whole-grain products, such as brown rice, barley, and whole-wheat pasta  • Eat more beans, peas, and lentils  For example, add beans to soups or salads  Eat at least 5 cups of fruits and vegetables each day  Eat fruits and vegetables with the peel because the peel is high in fiber  © Copyright Kelliea Rubsal 2022 Information is for End User's use only and may not be sold, redistributed or otherwise used for commercial purposes  The above information is an  only  It is not intended as medical advice for individual conditions or treatments  Talk to your doctor, nurse or pharmacist before following any medical regimen to see if it is safe and effective for you  Heart Healthy Diet   AMBULATORY CARE:   A heart healthy diet  is an eating plan low in unhealthy fats and sodium (salt)  The plan is high in healthy fats and fiber  A heart healthy diet helps improve your cholesterol levels and lowers your risk for heart disease and stroke  A dietitian will teach you how to read and understand food labels  Heart healthy diet guidelines to follow:   • Choose foods that contain healthy fats:      ? Unsaturated fats  include monounsaturated and polyunsaturated fats  Unsaturated fat is found in foods such as soybean, canola, olive, corn, and safflower oils  It is also found in soft tub margarine that is made with liquid vegetable oil  ? Omega-3 fat  is found in certain fish, such as salmon, tuna, and trout, and in walnuts and flaxseed  Eat fish high in omega-3 fats at least 2 times a week  • Limit or do not have unhealthy fats:      ? Cholesterol  is found in animal foods, such as eggs and lobster, and in dairy products made from whole milk  Limit cholesterol to less than 200 mg each day  ? Saturated fat  is found in meats, such as ornelas and hamburger  It is also found in chicken or turkey skin, whole milk, and butter  Limit saturated fat to less than 7% of your total daily calories  ? Trans fat  is found in packaged foods, such as potato chips and cookies  It is also in hard margarine, some fried foods, and shortening  Do not eat foods that contain trans fats  • Get 20 to 30 grams of fiber each day  Fruits, vegetables, whole-grain foods, and legumes (cooked beans) are good sources of fiber  • Limit sodium as directed  You may be told to limit sodium, such as to 2,000 mg or less each day  Choose low-sodium or no-salt-added foods  Add little or no salt to food you prepare  Use herbs and spices in place of salt  Include the following in your heart healthy plan:  Ask your dietitian or healthcare provider how many servings to have each day from the following food groups:  • Grains:      ? Whole-wheat breads, cereals, and pastas, and brown rice    ? Low-fat, low-sodium crackers and chips    • Vegetables:      ? Broccoli, green beans, green peas, and spinach    ? Collards, kale, and lima beans    ? Carrots, sweet potatoes, tomatoes, and peppers    ? Canned vegetables with no salt added    • Fruits:      ? Bananas, peaches, pears, and pineapple    ? Grapes, raisins, and dates    ?  Oranges, tangerines, grapefruit, orange juice, and grapefruit juice    ? Apricots, mangoes, melons, and papaya    ? Raspberries and strawberries    ? Canned fruit with no added sugar    • Low-fat dairy:      ? Nonfat (skim) milk, 1% milk, and low-fat almond, cashew, or soy milks fortified with calcium    ? Low-fat cheese, regular or frozen yogurt, and cottage cheese    • Meats and proteins:      ? Lean cuts of beef and pork (loin, leg, round), skinless chicken and turkey    ? Legumes, soy products, egg whites, or nuts    Limit or do not include the following in your heart healthy plan:   • Foods and liquids that contain unhealthy fats and oils:      ? Whole or 2% milk, cream cheese, sour cream, or cheese    ? High-fat cuts of beef (T-bone steaks, ribs), chicken or turkey with skin, and organ meats such as liver    ? Butter, stick margarine, shortening, and cooking oils such as coconut or palm oil    • Foods and liquids high in sodium:      ? Packaged foods, such as frozen dinners, cookies, macaroni and cheese, and cereals with more than 300 mg of sodium per serving    ? Vegetables with added sodium, such as instant potatoes, vegetables with added sauces, or regular canned vegetables    ? Cured or smoked meats, such as hot dogs, ornelas, and sausage    ? High-sodium ketchup, barbecue sauce, salad dressing, pickles, olives, soy sauce, or miso    • Foods and liquids high in sugar:      ? Candy, cake, cookies, pies, or doughnuts    ? Soft drinks (soda), sports drinks, or sweetened tea    ? Canned or dry mixes for cakes, soups, sauces, or gravies    Other healthy heart guidelines:   • Do not smoke  Nicotine and other chemicals in cigarettes and cigars can cause lung and heart damage  Ask your healthcare provider for information if you currently smoke and need help to quit  E-cigarettes or smokeless tobacco still contain nicotine  Talk to your provider before you use these products  • Limit or do not drink alcohol as directed    Alcohol can damage your heart and raise your blood pressure  Your healthcare provider may give you specific daily and weekly limits  The general recommended limit is 1 drink a day for women 21 or older and for men 72 or older  Do not have more than 3 drinks within 24 hours or 7 within a week  The recommended limit is 2 drinks a day for men 24to 59years of age  Do not have more than 4 drinks within 24 hours or 14 within a week  A drink of alcohol is 12 ounces of beer, 5 ounces of wine, or 1½ ounces of liquor  • Maintain a healthy weight  Extra body weight makes your heart work harder  Ask your provider what a healthy weight is for you  He or she can help you create a safe weight loss plan, if needed  • Exercise regularly  Exercise can help you maintain a healthy weight and improve your blood pressure and cholesterol levels  Regular exercise can also decrease your risk for heart problems  Ask your provider about the best exercise plan for you  Do not start an exercise program without asking your provider  Follow up with your doctor or cardiologist as directed:  Write down your questions so you remember to ask them during your visits  © Copyright [employer] 2022 Information is for End User's use only and may not be sold, redistributed or otherwise used for commercial purposes  The above information is an  only  It is not intended as medical advice for individual conditions or treatments  Talk to your doctor, nurse or pharmacist before following any medical regimen to see if it is safe and effective for you

## 2023-06-14 NOTE — PROGRESS NOTES
BMI Counseling: Body mass index is 29 49 kg/m²  The BMI is above normal  Nutrition recommendations include decreasing portion sizes and encouraging healthy choices of fruits and vegetables  Exercise recommendations include moderate physical activity 150 minutes/week  No pharmacotherapy was ordered  Rationale for BMI follow-up plan is due to patient being overweight or obese  Depression Screening and Follow-up Plan: Patient was screened for depression during today's encounter  They screened negative with a PHQ-2 score of 0  Assessment/Plan:  Problem List Items Addressed This Visit        Cardiovascular and Mediastinum    Subacute- Chronic CVA (cerebral vascular accident) ([facility] [address] )    Relevant Orders    Echo complete w/ contrast if indicated    AMB extended holter monitor    Essential hypertension    Relevant Orders    Echo complete w/ contrast if indicated    AMB extended holter monitor       Nervous and Auditory    Mild vascular dementia without behavioral disturbance, psychotic disturbance, mood disturbance, or anxiety (HCC)    Relevant Medications    donepezil (ARICEPT) 5 mg tablet    Mild cognitive impairment    Relevant Medications    donepezil (ARICEPT) 5 mg tablet    Bilateral sensorineural hearing loss       Musculoskeletal and Integument    Osteoarthritis       Genitourinary    Urinary retention    Stage 4 chronic kidney disease (HCC)    Benign prostatic hyperplasia with incomplete bladder emptying       Other    Syncope - Primary    Relevant Orders    Echo complete w/ contrast if indicated    AMB extended holter monitor    Suprapubic catheter [facility])    Former smoker    Chews tobacco   Other Visit Diagnoses     Overweight (BMI 25 0-29  9)        Seasonal allergies        Relevant Medications    levocetirizine (XYZAL) 5 MG tablet    Long toenail        Relevant Orders    Ambulatory Referral to Podiatry    Constipation, unspecified constipation type        Relevant Medications    polyethylene glycol (GLYCOLAX) 17 GM/SCOOP powder           Diagnoses and all orders for this visit:    Syncope, unspecified syncope type  -     Echo complete w/ contrast if indicated; Future  -     AMB extended holter monitor; Future    Cerebrovascular accident (CVA), unspecified mechanism ([facility] [address] )  -     Echo complete w/ contrast if indicated; Future  -     AMB extended holter monitor; Future    Essential hypertension  -     Echo complete w/ contrast if indicated; Future  -     AMB extended holter monitor; Future    Mild cognitive impairment  -     donepezil (ARICEPT) 5 mg tablet; Take 1 tablet (5 mg total) by mouth daily at bedtime    Bilateral sensorineural hearing loss    Primary osteoarthritis involving multiple joints    Urinary retention    Benign prostatic hyperplasia with incomplete bladder emptying    Stage 4 chronic kidney disease (HCC)    Overweight (BMI 25 0-29  9)    Chews tobacco    Former smoker    [facility]    Mild vascular dementia without behavioral disturbance, psychotic disturbance, mood disturbance, or anxiety ([facility])  -     donepezil (ARICEPT) 5 mg tablet; Take 1 tablet (5 mg total) by mouth daily at bedtime    Seasonal allergies  -     levocetirizine (XYZAL) 5 MG tablet; Take 1 tablet (5 mg total) by mouth every evening    Long toenail  -     Ambulatory Referral to Podiatry; Future    Constipation, unspecified constipation type  -     polyethylene glycol (GLYCOLAX) 17 GM/SCOOP powder; Take 17 g by mouth daily        No problem-specific Assessment & Plan notes found for this encounter  A/P: Stable  Labs are up to date  Discussed BMI and will give info on diet and exercise  Will check an alpha one  Get into see a DDS and an eye doc  Will start aricept for suspected dementia  Will add xyzal for allergies  Will refer to [facility] for [facility] care  Refer to [facility] for home PT  Start miralax for the constipation  Will check an echo and zio for the syncope   COntinue current treatment and RTC one month for f/u and AWV      Subjective:      Patient ID: Efrem Barajas is a 81 y o  male  WM presents with his son, former pt of Dr Ananda Chavez at the [facility], presents to establish  PMH includes recent syncope, CVA, urinary retention, BPH, HTN, [place] , DJD, CKD, etc   PSH of hernia, cataract, shoulder sx, TURP  Former  Smoker and denies ETOH  Doing ok and no c/o's, but recently admitted for syncope  W/u negative, but past CVA noted  NO further events  ROS positive for constipation and clearing his throat  Remains active w/o difficulty and no falls  Denies depression  No suicidal or violent ideations  Working skilled nursing  No recent travel  No fever, chills, or sweats  No unexplained wt change  Denies CP, SOB, palpitations, edema, orthopnea, or PND  No sz    No changes in bowel or bladder habits,but ongoing constipation  No trouble swallowing  Appetite and sleep are good  OVerdue to see both a DDS and an eye doctor  Currently labs are up to date                The following portions of the patient's history were reviewed and updated as appropriate:   He has a past medical history of Hypertension and Urinary retention  ,  does not have any pertinent problems on file  ,   has a past surgical history that includes Shoulder surgery (Left); Hiatal hernia repair; Cataract extraction; Hernia repair; pr trurl electrosurg rescj prostate bleed complete (N/A, 7/22/2021); and Suprapubic tube placement (N/A, 7/22/2021)  ,  family history is not on file  ,   reports that he has never smoked  He has never been exposed to tobacco smoke  His smokeless tobacco use includes chew  He reports that he does not drink alcohol and does not use drugs  ,  has No Known Allergies     Current Outpatient Medications   Medication Sig Dispense Refill   • aspirin 81 mg chewable tablet Chew 1 tablet (81 mg total) daily Do not start before May 24, 2023  30 tablet 0   • atorvastatin (LIPITOR) 40 mg tablet Take 1 tablet (40 mg total) by mouth every evening 30 tablet 0   • cholecalciferol (VITAMIN D3) 1,000 units tablet Take 2,000 Units by mouth daily     • donepezil (ARICEPT) 5 mg tablet Take 1 tablet (5 mg total) by mouth daily at bedtime 90 tablet 1   • finasteride (PROSCAR) 5 mg tablet Take 1 tablet (5 mg total) by mouth daily 90 tablet 2   • levocetirizine (XYZAL) 5 MG tablet Take 1 tablet (5 mg total) by mouth every evening 90 tablet 1   • oxybutynin (DITROPAN) 5 mg tablet Take 1 tablet (5 mg total) by mouth 2 (two) times a day as needed (bladder spasms) 20 tablet 1   • polyethylene glycol (GLYCOLAX) 17 GM/SCOOP powder Take 17 g by mouth daily 850 g 1   • tamsulosin (FLOMAX) 0 4 mg Take 2 capsules (0 8 mg total) by mouth daily with dinner 180 capsule 3     No current facility-administered medications for this visit  Review of Systems   Constitutional: Negative for activity change, chills, diaphoresis, fatigue and fever  Eyes: Negative for visual disturbance  Respiratory: Negative for cough, chest tightness, shortness of breath and wheezing  Cardiovascular: Negative for chest pain, palpitations and leg swelling  Gastrointestinal: Negative for abdominal pain, constipation, diarrhea, nausea and vomiting  Endocrine: Negative for cold intolerance and heat intolerance  Genitourinary: Negative for difficulty urinating, dysuria and frequency  Musculoskeletal: Negative for arthralgias, gait problem and myalgias  Neurological: Negative for dizziness, seizures, syncope, weakness, light-headedness and headaches  Psychiatric/Behavioral: Negative for confusion, dysphoric mood and sleep disturbance  The patient is not nervous/anxious          PHQ-2/9 Depression Screening    Little interest or pleasure in doing things: 0 - not at all  Feeling down, depressed, or hopeless: 0 - not at all  PHQ-2 Score: 0  PHQ-2 Interpretation: Negative depression screen        Objective:  Vitals:    06/14/23 1400   BP: 124/58   Pulse: 76   Temp: 99 4 °F (37 4 °C)   SpO2: 95%   Weight: 79 2 kg (174 lb 8 "oz)   Height: 5' 4 5\" (1 638 m)     Body mass index is 29 49 kg/m²  Physical Exam  Vitals and nursing note reviewed  Constitutional:       General: He is not in acute distress  Appearance: Normal appearance  He is not ill-appearing  HENT:      Head: Normocephalic and atraumatic  Mouth/Throat:      Mouth: Mucous membranes are moist    Eyes:      Extraocular Movements: Extraocular movements intact  Conjunctiva/sclera: Conjunctivae normal       Pupils: Pupils are equal, round, and reactive to light  Neck:      Vascular: No carotid bruit  Cardiovascular:      Rate and Rhythm: Regular rhythm  Heart sounds: Normal heart sounds  Pulmonary:      Effort: Pulmonary effort is normal  No respiratory distress  Breath sounds: Normal breath sounds  No wheezing, rhonchi or rales  Abdominal:      General: Bowel sounds are normal  There is no distension  Palpations: Abdomen is soft  Tenderness: There is no abdominal tenderness  Musculoskeletal:      Cervical back: Neck supple  Right lower leg: No edema  Left lower leg: No edema  Neurological:      General: No focal deficit present  Mental Status: He is alert and oriented to person, place, and time  Mental status is at baseline  Psychiatric:         Mood and Affect: Mood normal          Behavior: Behavior normal          Thought Content: Thought content normal          Judgment: Judgment normal          BMI Counseling: Body mass index is 29 49 kg/m²  The BMI is above normal  Nutrition recommendations include reducing portion sizes, decreasing overall calorie intake, reducing intake of saturated fat and trans fat and reducing intake of cholesterol  Exercise recommendations include moderate aerobic physical activity for 150 minutes/week    "

## 2023-06-14 NOTE — TELEPHONE ENCOUNTER
Hello Good Morning,     Can you please provide HFU instructions?       HFU/ [facility] [place]/syncopal episode followed by left sided weakness and dysarthria, facial asymmetry, possible aphasia    DC- HOME- 5/23/23        Thank you for your time and help,     Jordan Lundy

## 2023-06-20 ENCOUNTER — PROCEDURE VISIT (OUTPATIENT)
Dept: UROLOGY | Facility: CLINIC | Age: 83
End: 2023-06-20
Payer: COMMERCIAL

## 2023-06-20 VITALS
HEIGHT: 65 IN | BODY MASS INDEX: 26.99 KG/M2 | WEIGHT: 162 LBS | HEART RATE: 64 BPM | DIASTOLIC BLOOD PRESSURE: 64 MMHG | SYSTOLIC BLOOD PRESSURE: 130 MMHG

## 2023-06-20 DIAGNOSIS — N40.1 BPH WITH OBSTRUCTION/LOWER URINARY TRACT SYMPTOMS: ICD-10-CM

## 2023-06-20 DIAGNOSIS — N32.89 BLADDER SPASM: ICD-10-CM

## 2023-06-20 DIAGNOSIS — R33.9 URINARY RETENTION: Primary | ICD-10-CM

## 2023-06-20 DIAGNOSIS — N13.8 BPH WITH OBSTRUCTION/LOWER URINARY TRACT SYMPTOMS: ICD-10-CM

## 2023-06-20 PROCEDURE — 51705 CHANGE OF BLADDER TUBE: CPT

## 2023-06-20 NOTE — PROGRESS NOTES
"6/20/2023    Ye Ernandez  1940  [id number]    Diagnosis: Urinary retention, BPH with obstruction, Bladder spasms  Chief Complaint    SPT change         Patient presents for routine SPT exchange managed by Dr Nicole Mcneal  Follow up in 3 weeks for next SPT change     Procedure: Suprapubic Tube Change         Cystostomy tube change     Date/Time 6/20/2023 10:30 AM     Performed by  Aleena Light RN   Authorized by Socorro Oglesby MD     Universal Protocol   Consent: Verbal consent obtained  Risks and benefits: risks, benefits and alternatives were discussed  Consent given by: patient  Patient understanding: patient states understanding of the procedure being performed  Patient identity confirmed: verbally with patient        Local anesthesia used: no     Anesthesia   Local anesthesia used: no     Sedation   Patient sedated: no        Specimen: no    Culture: no   Procedure Details   Patient tolerance: patient tolerated the procedure well with no immediate complications           Current catheter removed without difficulty after deflation of an intact balloon  Site prepped with Betadine, new 18F  suprapubic spt change via aseptic technique without incident, 10 ml balloon inflated with sterile water  Irrigated easily for straw-yellow return, mild spasm noted  Patient tolerated well  Catheter plug attached       Vitals:    06/20/23 1028   BP: 130/64   Pulse: 64   Weight: 73 5 kg (162 lb)   Height: 5' 4 5\" (1 638 m)         Aleena Light RN  "

## 2023-06-20 NOTE — PROGRESS NOTES
I supervised the Advanced Practitioner  I reviewed the Advanced Practitioner note and agree      Socorro Oglesby MD 06/20/23

## 2023-07-03 ENCOUNTER — OFFICE VISIT (OUTPATIENT)
Dept: PODIATRY | Facility: CLINIC | Age: 83
End: 2023-07-03
Payer: MEDICARE

## 2023-07-03 VITALS
HEIGHT: 65 IN | DIASTOLIC BLOOD PRESSURE: 72 MMHG | HEART RATE: 65 BPM | WEIGHT: 173.2 LBS | BODY MASS INDEX: 28.86 KG/M2 | SYSTOLIC BLOOD PRESSURE: 130 MMHG

## 2023-07-03 DIAGNOSIS — B35.1 ONYCHOMYCOSIS: ICD-10-CM

## 2023-07-03 DIAGNOSIS — I87.2 VENOUS INSUFFICIENCY OF BOTH LOWER EXTREMITIES: Primary | ICD-10-CM

## 2023-07-03 DIAGNOSIS — L60.2 LONG TOENAIL: ICD-10-CM

## 2023-07-03 PROCEDURE — 99203 OFFICE O/P NEW LOW 30 MIN: CPT | Performed by: STUDENT IN AN ORGANIZED HEALTH CARE EDUCATION/TRAINING PROGRAM

## 2023-07-03 PROCEDURE — 11721 DEBRIDE NAIL 6 OR MORE: CPT | Performed by: STUDENT IN AN ORGANIZED HEALTH CARE EDUCATION/TRAINING PROGRAM

## 2023-07-03 NOTE — PROGRESS NOTES
Assessment/Plan:    No problem-specific Assessment & Plan notes found for this encounter. Diagnoses and all orders for this visit:    Venous insufficiency of both lower extremities    Long toenail  -     Ambulatory Referral to Podiatry    Onychomycosis      Plan:     1. A thorough neurovascular exam was performed. Patient presents for at-risk foot care. Patient has no acute concerns today. Patient has significant lower extremity risk due to neuropathy, parasthesia, edema, and trophic skin changes to the lower extremity. Patient has Q9  findings and is recommended for at risk foot care every 3 months. 2. All 10 toenails were debridement as follow: using nail nipper, haroon, and curette, nails were sharply debrided, reduced in thickness and length. Devitalized nail tissue and fungal debris excised and removed. Patient tolerated well. 3. Patient was educated on importance of daily foot assessment and proper shoe gear. Educational materials were provided. Recommended to wear compression stockings during the day for edema control. 4. Call if any questions or occurrence of any foot and ankle related complications     5. Return in 10-12 weeks. 6.  Recent labs and PCP notes reviewed. Lab Results   Component Value Date    HGBA1C 5.4 05/23/2023           Subjective:      Patient ID: Chad Gay is a 81 y.o. male. HPI:  Chad Gay is a 81 y.o. male who presents with painful, elongated toenails. They have difficulty applying their socks and shoes due to the elongation of the nails. The pressure within their shoe gear is painful and they have been unable to cut their nails adequately. Patient states pain is 1/10 in shoe gear. Pain with pressure. Requires at risk foot care. Patient presents with son. Denies any other complaints at this time.         The following portions of the patient's history were reviewed and updated as appropriate:   He  has a past medical history of Hypertension and Urinary retention. He   Patient Active Problem List    Diagnosis Date Noted   • Former smoker 06/14/2023   • Chews tobacco 06/14/2023   • Suprapubic catheter ([address]) 06/14/2023   • Mild vascular dementia without behavioral disturbance, psychotic disturbance, mood disturbance, or anxiety ([address]) 06/14/2023   • Platelets decreased ([address]) 06/14/2023   • Malignant neoplasm of prostate ([address]) 06/14/2023   • Bilateral sensorineural hearing loss 05/22/2023   • Mild cognitive impairment 05/22/2023   • Osteoarthritis 05/22/2023   • Stage 4 chronic kidney disease ([address]) 05/22/2023   • Urinary retention 05/22/2023   • Subacute- Chronic CVA (cerebral vascular accident) ([address]) 05/22/2023   • Syncope 05/22/2023   • Obstructive uropathy 06/15/2021   • Benign prostatic hyperplasia with incomplete bladder emptying 05/24/2021   • Essential hypertension 05/24/2021     He  has a past surgical history that includes Shoulder surgery (Left); Hiatal hernia repair; Cataract extraction; Hernia repair; pr trurl electrosurg rescj prostate bleed complete (N/A, 7/22/2021); and Suprapubic tube placement (N/A, 7/22/2021). .    Review of Systems   All other systems reviewed and are negative. Objective:      /72 (BP Location: Left arm, Patient Position: Sitting, Cuff Size: Standard)   Pulse 65   Ht 5' 4.5" (1.638 m)   Wt 78.6 kg (173 lb 3.2 oz)   BMI 29.27 kg/m²          Physical Exam  Vitals reviewed. Cardiovascular:      Pulses:           Dorsalis pedis pulses are 2+ on the right side and 2+ on the left side. Posterior tibial pulses are 2+ on the right side and 2+ on the left side. Feet:      Right foot:      Protective Sensation: 10 sites tested. 9 sites sensed. Skin integrity: Dry skin present. Toenail Condition: Right toenails are abnormally thick and long. Fungal disease present. Left foot:      Protective Sensation: 10 sites tested. 8 sites sensed. Skin integrity: Dry skin present.       Toenail Condition: Left toenails are abnormally thick and long. Fungal disease present. Comments: On exam patient has thickened, hypertrophic, discolored, brittle toenails with subungual debris and tenderness x10.  Patient has lower extremity edema  PAtients skin is atrophic, thickened nails, and decreased pedal hair  Patient has decreased pinprick and vibratory sensation to his feet and parasthesia

## 2023-07-05 NOTE — TELEPHONE ENCOUNTER
Patients son called and left message about changing patient pharmacy to the [address] patients son back & was unable to leave a voicemail .

## 2023-07-06 ENCOUNTER — TELEPHONE (OUTPATIENT)
Dept: NEUROLOGY | Facility: CLINIC | Age: 83
End: 2023-07-06

## 2023-07-06 NOTE — TELEPHONE ENCOUNTER
I called and left a voicemail message reminding the patient of there upcoming appointment with Isabelle Urias PA-C on 7/13/23 @ 10:15am in the [facility]. I requested a call back to our office to confirm the appointment or if the patient has any issues or concerns or cannot keep this appointment.

## 2023-07-13 ENCOUNTER — PROCEDURE VISIT (OUTPATIENT)
Dept: UROLOGY | Facility: CLINIC | Age: 83
End: 2023-07-13
Payer: COMMERCIAL

## 2023-07-13 ENCOUNTER — OFFICE VISIT (OUTPATIENT)
Dept: NEUROLOGY | Facility: CLINIC | Age: 83
End: 2023-07-13
Payer: MEDICARE

## 2023-07-13 ENCOUNTER — TELEPHONE (OUTPATIENT)
Dept: UROLOGY | Facility: CLINIC | Age: 83
End: 2023-07-13

## 2023-07-13 VITALS
HEIGHT: 65 IN | TEMPERATURE: 98 F | OXYGEN SATURATION: 96 % | BODY MASS INDEX: 28.66 KG/M2 | RESPIRATION RATE: 18 BRPM | WEIGHT: 172 LBS | DIASTOLIC BLOOD PRESSURE: 70 MMHG | HEART RATE: 68 BPM | SYSTOLIC BLOOD PRESSURE: 110 MMHG

## 2023-07-13 VITALS
SYSTOLIC BLOOD PRESSURE: 136 MMHG | WEIGHT: 170 LBS | DIASTOLIC BLOOD PRESSURE: 64 MMHG | BODY MASS INDEX: 28.32 KG/M2 | HEART RATE: 70 BPM | HEIGHT: 65 IN

## 2023-07-13 DIAGNOSIS — I63.9 CVA (CEREBRAL VASCULAR ACCIDENT) (HCC): ICD-10-CM

## 2023-07-13 DIAGNOSIS — R33.9 URINARY RETENTION: Primary | ICD-10-CM

## 2023-07-13 DIAGNOSIS — N32.89 BLADDER SPASM: ICD-10-CM

## 2023-07-13 DIAGNOSIS — Z86.73 HISTORY OF CVA (CEREBROVASCULAR ACCIDENT): Primary | ICD-10-CM

## 2023-07-13 DIAGNOSIS — N13.8 BPH WITH OBSTRUCTION/LOWER URINARY TRACT SYMPTOMS: ICD-10-CM

## 2023-07-13 DIAGNOSIS — R55 SYNCOPE, UNSPECIFIED SYNCOPE TYPE: ICD-10-CM

## 2023-07-13 DIAGNOSIS — I10 ESSENTIAL HYPERTENSION: ICD-10-CM

## 2023-07-13 DIAGNOSIS — F01.A0 MILD VASCULAR DEMENTIA WITHOUT BEHAVIORAL DISTURBANCE, PSYCHOTIC DISTURBANCE, MOOD DISTURBANCE, OR ANXIETY (HCC): ICD-10-CM

## 2023-07-13 DIAGNOSIS — N40.1 BPH WITH OBSTRUCTION/LOWER URINARY TRACT SYMPTOMS: ICD-10-CM

## 2023-07-13 PROCEDURE — 51705 CHANGE OF BLADDER TUBE: CPT

## 2023-07-13 PROCEDURE — 99214 OFFICE O/P EST MOD 30 MIN: CPT | Performed by: PHYSICIAN ASSISTANT

## 2023-07-13 RX ORDER — ATORVASTATIN CALCIUM 40 MG/1
40 TABLET, FILM COATED ORAL EVERY EVENING
Qty: 30 TABLET | Refills: 5 | Status: SHIPPED | OUTPATIENT
Start: 2023-07-13

## 2023-07-13 RX ORDER — OXYBUTYNIN CHLORIDE 5 MG/1
5 TABLET ORAL 2 TIMES DAILY PRN
Qty: 20 TABLET | Refills: 11 | Status: SHIPPED | OUTPATIENT
Start: 2023-07-13

## 2023-07-13 RX ORDER — ASPIRIN 81 MG/1
81 TABLET, CHEWABLE ORAL DAILY
Qty: 30 TABLET | Refills: 5 | Status: SHIPPED | OUTPATIENT
Start: 2023-07-13

## 2023-07-13 NOTE — PROGRESS NOTES
I supervised the Advanced Practitioner. I reviewed the Advanced Practitioner note and agree.     Leopoldo Counter, MD 07/13/23

## 2023-07-13 NOTE — PROGRESS NOTES
Patient ID: Barby Park is a 81 y.o. male ith PMH of prior stroke, memory problems/cognitive impairment, hypertension, CKD, and BPH with urinary retention who presents today for a hospital follow up. Assessment:  Patient presented on 5/22/23 with syncopal episode at home, no witnessed seizure-like activity or post-ictal state. Stroke alert was called as there was concern for left sided weakness when the event of syncope occurred, improving in the hospital.  CTA with no LVO. He was not given IV thrombolytics. He was noted to have old strokes in the bilateral cerebellum, as well as left occipital area, which appeared embolic. There was NO acute stroke seen. Despite prior stroke, did not seem to be on ASA at baseline. He was initiated on ASA, statin, and recommended to see cardiology for long term rhythm monitoring. He has not experienced any new neurologic symptoms to indicate recurrent TIA/CVA. We discussed cardiology eval for Zio/Loop to evaluate for Afib as a cause of prior strokes. Also should have cardiac workup for the initial syncopal event. If Afib is found, would need full AC. For now, he should maintain on aspirin 81 mg daily and Lipitor 40 mg daily. He should continue to follow with his PCP for management of blood pressure (goal BP less than 130/80 routinely), cholesterol (goal LDL less than 70), as well as blood sugars. There seems to be a degree of cognitive impairment. Details regarding timeline of cognitive decline are unclear, as patient and son are both poor historians. I am mainly concerned patient is not taking medications appropriately as son fills pill box but often finds patient has not taken them, or has taken them from the wrong day. Son is working long hours and patient is home by himself (or there is a roommate there but not involved in patient's care). He does currently have [facility] for therapies and nursing. Likely requires some more supervision at home. Sending message to social work to assist with any resources/services they can apply for. Will also place referred for [facility]/geriatrics assessment. Plan:  -for ongoing stroke prevention patient will continue aspirin 81mg daily, Lipitor 40mg daily  -will defer management of blood pressure, cholesterol and blood sugar to patient's PCP  -referral to cardiology for cardiac rhythm monitoring. If Afib is found, patient will require full anticoagulation  -heart healthy diet and routine exercise as tolerated are advised  -continue therapies through [facility]  -referral to [facility] (geriatrics) for further evaluation of memory decline, resources etc  -will have  reach out regarding any resources for help in the home, etc  -return in 4 months or sooner if needed    Signs and symptoms of stroke were reviewed and included in the AVS today. Diagnoses and all orders for this visit:    History of CVA (cerebrovascular accident)  -     Ambulatory Referral to Cardiology; Future    Syncope, unspecified syncope type  -     Ambulatory Referral to Cardiology; Future    Essential hypertension    CVA (cerebral vascular accident) ([address])  -     atorvastatin (LIPITOR) 40 mg tablet; Take 1 tablet (40 mg total) by mouth every evening  -     aspirin 81 mg chewable tablet; Chew 1 tablet (81 mg total) daily    Mild vascular dementia without behavioral disturbance, psychotic disturbance, mood disturbance, or anxiety ([address])  -     Ambulatory Referral to Senior Care; Future           Subjective:    HPI    Patient is a 81-year-old male with PMH of prior stroke, memory problems/cognitive impairment, hypertension, CKD, and BPH with urinary retention who presents today for a hospital follow up. Patient presented to the ED on 5/22/23 after a syncopal episode. Per chart review, the history was a bit unclear.   Patient reportedly had a syncopal episode and was thought to have left-sided weakness following the incident. In the ED, there may have been some degree of dysarthria, facial asymmetry and aphasia as well. Symptoms were rapidly resolving on reevaluation. NIHSS 5 initially, but on re-eval 3.  /79. CTH with no hemorrhage, likely subacute infarct in the left temporal occipital region as well as left postcentral gyrus. 6 mm soft tissue nodular projection into the left frontal horn of the lateral ventricle. CTA with no evidence of large vessel occlusion. IV thrombolysis not given due to resolving symptoms, low NIHSS. He was admitted on stroke pathway. Patient reported that he came into his home from being outside doing yard work and was walking into the kitchen, felt really lightheaded and was going to get a drink from the fridge where he apparently lost consciousness. He said he fell against a wall and his son came over and helped him sit down. He reported at some point he noticed left sided weakness, not sure exactly when. He said people mentioned he was slurring but didn't notice this himself. He didn’t recall palpitations. A1C 5.4, lipid panel , LDL 61. MRI brain w wo contrast with no acute stroke. There were chronic 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 and bilateral cerebellar hemispheres. Chronic frontotemporal predominant volume loss and ventriculomegaly. 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 These have been stable since the MRI and CT scans from 2012. Per inpatient neurology, concern for embolic etiology of occipital stroke (cortical infarct), and no correlate on CTA for that (less likely atheroembolic). Outpatient cardiac monitoring was advised. He was initiated on ASA 81mg daily and statin (was not on antiplatelet at home despite prior stroke). He just established with a PCP (outside of the [address] [place] [address]) following discharge.   ECHO and Holter monitor were ordered, but not yet completed. The PCP started him on Aricept 5mg daily for likely dementia. Today, patient presents with his son, Damián Gunn. They are both difficult historians. Patient relates history of prior stroke, but unsure when this occurred (they guess about 5 years ago). Patient was living in [place] [facility] at the time with another son. He more recently moved back to [place] with his son Damián Gunn because his son in [place] "couldn't handle him anymore". They both note cognitive/memory issues but cannot give me a timeline on when this started (within the last few years). His son works long hours and is not always home, but says a friend/roommate lives in the home as well and is there most of the time. Patient is not good with taking his meds. His son fills the pill box weekly and sometimes meds are gone from the wrong day, sometimes it appears he did not take his meds. He is able to do ADLs on his own for the most part. He has [facility] coming into the home currently, he says he is getting therapies. Patient has not experienced any new neurologic symptoms since hospitalization. The following portions of the patient's history were reviewed and updated as appropriate: current medications, past family history, past medical history, past social history, past surgical history and problem list.         Objective:    Blood pressure 110/70, pulse 68, temperature 98 °F (36.7 °C), temperature source Temporal, resp. rate 18, height 5' 4.5" (1.638 m), weight 78 kg (172 lb), SpO2 96 %. Physical Exam  Constitutional:       Appearance: Normal appearance. HENT:      Head: Normocephalic and atraumatic. Eyes:      Extraocular Movements: EOM normal.      Pupils: Pupils are equal, round, and reactive to light. Neurological:      Mental Status: He is alert. Motor: Motor strength is normal.     Deep Tendon Reflexes:      Reflex Scores:       Bicep reflexes are 1+ on the right side and 1+ on the left side.        Brachioradialis reflexes are 1+ on the right side and 1+ on the left side. Patellar reflexes are 1+ on the right side and 1+ on the left side. Achilles reflexes are 1+ on the right side and 1+ on the left side. Psychiatric:         Mood and Affect: Mood normal.         Speech: Speech normal.         Behavior: Behavior normal.         Neurological Exam  Mental Status  Alert. Orientation: Oriented to person, month (July). He was not oriented to date (said it was the 7th) or year (said 2026). He did not know the current [address] but says he "doesn't care about politics". . Speech is normal. Language is fluent with no aphasia. Able to spell words backwards. HOUSE-ESUOH. Cranial Nerves  CN II: Visual fields full to confrontation. CN III, IV, VI: Extraocular movements intact bilaterally. Pupils equal round and reactive to light bilaterally. CN V: Facial sensation is normal.  CN VII: Full and symmetric facial movement. CN VIII: Hearing is normal.  CN IX, X: Palate elevates symmetrically  CN XI: Shoulder shrug strength is normal.  CN XII: Tongue midline without atrophy or fasciculations. Motor   Normal muscle tone. Strength is 5/5 throughout all four extremities. Sensory  Light touch is normal in upper and lower extremities. Reflexes                                            Right                      Left  Brachioradialis                    1+                         1+  Biceps                                 1+                         1+  Patellar                                1+                         1+  Achilles                                1+                         1+    Coordination  Right: Finger-to-nose normal.Left: Finger-to-nose normal.    Gait    Slow, cautious gait using cane . ROS:    Review of Systems   Constitutional: Positive for fatigue. Negative for chills and fever. HENT: Negative for ear pain and sore throat. Eyes: Negative for pain and visual disturbance.         Blurred vision Respiratory: Positive for cough. Negative for shortness of breath. Cardiovascular: Negative for chest pain and palpitations. Gastrointestinal: Positive for nausea. Negative for abdominal pain and vomiting. Genitourinary: Positive for frequency and urgency. Negative for dysuria and hematuria. Musculoskeletal: Positive for gait problem (uses a cane). Negative for arthralgias and back pain. Skin: Negative for color change and rash. Neurological: Positive for dizziness, tremors, syncope (once), weakness, light-headedness and headaches. Negative for seizures. Hematological: Bruises/bleeds easily. Psychiatric/Behavioral: Positive for confusion. Depression, anxiety, and mood swings   All other systems reviewed and are negative.     I personally reviewed and updated the ROS as appropriate

## 2023-07-13 NOTE — PATIENT INSTRUCTIONS
-for ongoing stroke prevention continue aspirin 81mg daily, Lipitor 40mg daily  -continue to follow with PCP for management of blood pressure, cholesterol and blood sugar  -heart healthy diet and routine exercise as tolerated  -continue therapies through [facility]  -referral to cardiology for cardiac rhythm monitoring  -referral to [facility] (geriatrics) for further evaluation of memory decline, resources etc  -will have  reach out regarding any resources for help in the home, etc  -return in 4 months or sooner if needed    If you experience any facial droop, weakness on one side of the body, speech or swallowing difficulty, painless loss of vision in one eye, double vision, vertigo that does not resolve quickly/imbalance, go to the ER/call 911.

## 2023-07-13 NOTE — PROGRESS NOTES
7/13/2023    [facility]  1940  [id number]    Diagnosis: Urinary retention, BPH with obstruction, Bladder spasms   Chief Complaint    SPT change         Patient presents for routine SPT exchange managed by Dr. Larry Zuniga. Reports urinating small amounts via urethra, however still having large volume PVR's from SPT. Plan  Follow up in 4 weeks     Procedure: Suprapubic Tube Change         Cystostomy tube change     Date/Time 7/13/2023 1:00 PM     Performed by  Brie Whitt RN   Authorized by James Baltazar MD     [place] Protocol   Consent: Verbal consent obtained. Risks and benefits: risks, benefits and alternatives were discussed  Consent given by: patient  Patient understanding: patient states understanding of the procedure being performed  Patient identity confirmed: verbally with patient        Local anesthesia used: no     Anesthesia   Local anesthesia used: no     Sedation   Patient sedated: no        Specimen: no    Culture: no   Procedure Details   Patient tolerance: patient tolerated the procedure well with no immediate complications           Current catheter removed without difficulty after deflation of an intact balloon. Site prepped with Betadine, new 18F  suprapubic spt change via aseptic technique without incident, 10 ml balloon inflated with sterile water. Irrigated easily for clear return, mild spasm noted. Patient tolerated well. Catheter plug attached.      Vitals:    07/13/23 1306   BP: 136/64   Pulse: 70   Weight: 77.1 kg (170 lb)   Height: 5' 4.5" (1.638 m)         Brie Whitt RN

## 2023-07-14 ENCOUNTER — TELEPHONE (OUTPATIENT)
Dept: INTERNAL MEDICINE CLINIC | Facility: CLINIC | Age: 83
End: 2023-07-14

## 2023-07-14 NOTE — TELEPHONE ENCOUNTER
[facility] called to let you know that they did not do  visit this week they are unable to reach pt.

## 2023-07-17 ENCOUNTER — OFFICE VISIT (OUTPATIENT)
Dept: INTERNAL MEDICINE CLINIC | Facility: CLINIC | Age: 83
End: 2023-07-17
Payer: MEDICARE

## 2023-07-17 ENCOUNTER — TELEPHONE (OUTPATIENT)
Dept: NEUROLOGY | Facility: CLINIC | Age: 83
End: 2023-07-17

## 2023-07-17 ENCOUNTER — HOSPITAL ENCOUNTER (OUTPATIENT)
Dept: NON INVASIVE DIAGNOSTICS | Facility: CLINIC | Age: 83
Discharge: HOME | End: 2023-07-17
Payer: MEDICARE

## 2023-07-17 VITALS
HEART RATE: 55 BPM | SYSTOLIC BLOOD PRESSURE: 136 MMHG | HEIGHT: 64 IN | WEIGHT: 170 LBS | DIASTOLIC BLOOD PRESSURE: 64 MMHG | BODY MASS INDEX: 29.02 KG/M2

## 2023-07-17 VITALS
OXYGEN SATURATION: 96 % | BODY MASS INDEX: 29.47 KG/M2 | WEIGHT: 172.6 LBS | HEART RATE: 71 BPM | TEMPERATURE: 99.4 F | HEIGHT: 64 IN | SYSTOLIC BLOOD PRESSURE: 130 MMHG | DIASTOLIC BLOOD PRESSURE: 60 MMHG

## 2023-07-17 DIAGNOSIS — J30.2 SEASONAL ALLERGIES: ICD-10-CM

## 2023-07-17 DIAGNOSIS — R55 SYNCOPE, UNSPECIFIED SYNCOPE TYPE: ICD-10-CM

## 2023-07-17 DIAGNOSIS — I51.89 DIASTOLIC DYSFUNCTION: ICD-10-CM

## 2023-07-17 DIAGNOSIS — K59.00 CONSTIPATION, UNSPECIFIED CONSTIPATION TYPE: ICD-10-CM

## 2023-07-17 DIAGNOSIS — Z00.00 MEDICARE ANNUAL WELLNESS VISIT, INITIAL: ICD-10-CM

## 2023-07-17 DIAGNOSIS — I63.9 CEREBROVASCULAR ACCIDENT (CVA), UNSPECIFIED MECHANISM (HCC): ICD-10-CM

## 2023-07-17 DIAGNOSIS — I10 ESSENTIAL HYPERTENSION: ICD-10-CM

## 2023-07-17 DIAGNOSIS — F01.A0 MILD VASCULAR DEMENTIA WITHOUT BEHAVIORAL DISTURBANCE, PSYCHOTIC DISTURBANCE, MOOD DISTURBANCE, OR ANXIETY (HCC): Primary | ICD-10-CM

## 2023-07-17 LAB
AORTIC ROOT: 2.9 CM
ASCENDING AORTA: 3 CM
E WAVE DECELERATION TIME: 222 MS
FRACTIONAL SHORTENING: 33 % (ref 28–44)
INTERVENTRICULAR SEPTUM IN DIASTOLE (PARASTERNAL SHORT AXIS VIEW): 1.1 CM
INTERVENTRICULAR SEPTUM: 1.1 CM (ref 0.6–1.1)
LAAS-AP2: 17.9 CM2
LAAS-AP4: 15.9 CM2
LEFT ATRIUM SIZE: 3.2 CM
LEFT ATRIUM VOLUME (MOD BIPLANE): 41 ML
LEFT INTERNAL DIMENSION IN SYSTOLE: 2.8 CM (ref 2.1–4)
LEFT VENTRICULAR INTERNAL DIMENSION IN DIASTOLE: 4.2 CM (ref 3.5–6)
LEFT VENTRICULAR POSTERIOR WALL IN END DIASTOLE: 1.1 CM
LEFT VENTRICULAR STROKE VOLUME: 48 ML
LVSV (TEICH): 48 ML
MV E'TISSUE VEL-SEP: 7 CM/S
MV PEAK A VEL: 0.92 M/S
MV PEAK E VEL: 60 CM/S
MV STENOSIS PRESSURE HALF TIME: 64 MS
MV VALVE AREA P 1/2 METHOD: 3.44 CM2
RIGHT ATRIUM AREA SYSTOLE A4C: 12.9 CM2
RIGHT VENTRICLE ID DIMENSION: 3.1 CM
SL CV LEFT ATRIUM LENGTH A2C: 5.7 CM
SL CV LV EF: 64
SL CV PED ECHO LEFT VENTRICLE DIASTOLIC VOLUME (MOD BIPLANE) 2D: 79 ML
SL CV PED ECHO LEFT VENTRICLE SYSTOLIC VOLUME (MOD BIPLANE) 2D: 30 ML
TR MAX PG: 16 MMHG
TR PEAK VELOCITY: 2 M/S
TRICUSPID ANNULAR PLANE SYSTOLIC EXCURSION: 2.3 CM
TRICUSPID VALVE PEAK REGURGITATION VELOCITY: 1.99 M/S

## 2023-07-17 PROCEDURE — 99214 OFFICE O/P EST MOD 30 MIN: CPT | Performed by: INTERNAL MEDICINE

## 2023-07-17 PROCEDURE — 93306 TTE W/DOPPLER COMPLETE: CPT | Performed by: INTERNAL MEDICINE

## 2023-07-17 PROCEDURE — G0438 PPPS, INITIAL VISIT: HCPCS | Performed by: INTERNAL MEDICINE

## 2023-07-17 PROCEDURE — 93306 TTE W/DOPPLER COMPLETE: CPT

## 2023-07-17 NOTE — PROGRESS NOTES
Assessment/Plan:  Problem List Items Addressed This Visit        Nervous and Auditory    Mild vascular dementia without behavioral disturbance, psychotic disturbance, mood disturbance, or anxiety ([address]) - Primary       Other    Syncope    Diastolic dysfunction   Other Visit Diagnoses     Constipation, unspecified constipation type        Seasonal allergies        Medicare annual wellness visit, initial               Diagnoses and all orders for this visit:    Mild vascular dementia without behavioral disturbance, psychotic disturbance, mood disturbance, or anxiety (HCC)    Constipation, unspecified constipation type    Seasonal allergies    Syncope, unspecified syncope type    Medicare annual wellness visit, initial    Diastolic dysfunction        No problem-specific Assessment & Plan notes found for this encounter. A/P: Doing ok and slow, but steady improvement noted. Dementia actually a little better and suspect some of the improvement due to CNS inflammation resolving, pt wearing his hearing aides,and aricept. Will continue current dose, but may need to increase in the future. Aniya Hanley Appreciate VNA/PT inpute. Will continue with PT. Discussed echo and no s/s of CHF. Consider beta blocker in the future. Await zio results. Continue current treatment and RTC three months for routine. Subjective:      Patient ID: Ana Spencer is a 81 y.o. male. WM RTC for one month f/u CVA, constipation, echo and zio results, and MCI/Dementia. No further CVA and continues with PT. Activity level increasing. Next, constipation a little better after adding miralax. Third, echo ok except for some diastolic dysfunction and zio patch not done yet. Finally, dementia no worse and ?a little better and tolerating the aricept. No new issues. The following portions of the patient's history were reviewed and updated as appropriate:   He has a past medical history of Hypertension and Urinary retention. ,  does not have any pertinent problems on file. ,   has a past surgical history that includes Shoulder surgery (Left); Hiatal hernia repair; Cataract extraction; Hernia repair; pr trurl electrosurg rescj prostate bleed complete (N/A, 7/22/2021); and Suprapubic tube placement (N/A, 7/22/2021). ,  family history is not on file. ,   reports that he has never smoked. He has never been exposed to tobacco smoke. His smokeless tobacco use includes chew. He reports that he does not drink alcohol and does not use drugs. ,  has No Known Allergies. .  Current Outpatient Medications   Medication Sig Dispense Refill   • aspirin 81 mg chewable tablet Chew 1 tablet (81 mg total) daily 30 tablet 5   • atorvastatin (LIPITOR) 40 mg tablet Take 1 tablet (40 mg total) by mouth every evening 30 tablet 5   • cholecalciferol (VITAMIN D3) 1,000 units tablet Take 2,000 Units by mouth daily     • donepezil (ARICEPT) 5 mg tablet Take 1 tablet (5 mg total) by mouth daily at bedtime 90 tablet 1   • finasteride (PROSCAR) 5 mg tablet Take 1 tablet (5 mg total) by mouth daily 90 tablet 2   • levocetirizine (XYZAL) 5 MG tablet Take 1 tablet (5 mg total) by mouth every evening 90 tablet 1   • oxybutynin (DITROPAN) 5 mg tablet Take 1 tablet (5 mg total) by mouth 2 (two) times a day as needed (bladder spasms) 20 tablet 11   • polyethylene glycol (GLYCOLAX) 17 GM/SCOOP powder Take 17 g by mouth daily 850 g 1   • tamsulosin (FLOMAX) 0.4 mg Take 2 capsules (0.8 mg total) by mouth daily with dinner 180 capsule 3     No current facility-administered medications for this visit. Review of Systems   Constitutional: Positive for activity change. Negative for chills, diaphoresis, fatigue and fever. HENT: Positive for hearing loss. Eyes: Negative for visual disturbance. Respiratory: Negative for cough, chest tightness, shortness of breath and wheezing. Cardiovascular: Negative for chest pain, palpitations and leg swelling.    Gastrointestinal: Negative for abdominal pain, constipation, diarrhea, nausea and vomiting. Endocrine: Negative for cold intolerance and heat intolerance. Genitourinary: Negative for difficulty urinating, dysuria and frequency. Musculoskeletal: Positive for gait problem. Negative for arthralgias and myalgias. Neurological: Positive for weakness. Negative for dizziness, seizures, syncope, light-headedness and headaches. Psychiatric/Behavioral: Positive for confusion. Negative for dysphoric mood and sleep disturbance. The patient is not nervous/anxious. PHQ-2/9 Depression Screening          Objective:  Vitals:    07/17/23 1439   BP: 130/60   BP Location: Left arm   Patient Position: Sitting   Cuff Size: Standard   Pulse: 71   Temp: 99.4 °F (37.4 °C)   SpO2: 96%   Weight: 78.3 kg (172 lb 9.6 oz)   Height: 5' 4" (1.626 m)     Body mass index is 29.63 kg/m². Physical Exam  Vitals and nursing note reviewed. Constitutional:       General: He is not in acute distress. Appearance: Normal appearance. He is not ill-appearing. HENT:      Head: Normocephalic and atraumatic. Mouth/Throat:      Mouth: Mucous membranes are moist.   Eyes:      Extraocular Movements: Extraocular movements intact. Conjunctiva/sclera: Conjunctivae normal.      Pupils: Pupils are equal, round, and reactive to light. Neck:      Vascular: No carotid bruit. Cardiovascular:      Rate and Rhythm: Normal rate and regular rhythm. Heart sounds: No murmur heard. Pulmonary:      Effort: Pulmonary effort is normal. No respiratory distress. Breath sounds: Normal breath sounds. No wheezing, rhonchi or rales. Abdominal:      General: Bowel sounds are normal. There is no distension. Palpations: Abdomen is soft. Tenderness: There is no abdominal tenderness. Musculoskeletal:      Cervical back: Neck supple. Right lower leg: No edema. Left lower leg: No edema. Neurological:      General: No focal deficit present.       Mental Status: He is alert and oriented to person, place, and time. Mental status is at baseline. Psychiatric:         Mood and Affect: Mood normal.         Behavior: Behavior normal.         Thought Content:  Thought content normal.         Judgment: Judgment normal.

## 2023-07-17 NOTE — PATIENT INSTRUCTIONS
Medicare Preventive Visit Patient Instructions  Thank you for completing your Welcome to Medicare Visit or Medicare Annual Wellness Visit today. Your next wellness visit will be due in one year (7/17/2024). The screening/preventive services that you may require over the next 5-10 years are detailed below. Some tests may not apply to you based off risk factors and/or age. Screening tests ordered at today's visit but not completed yet may show as past due. Also, please note that scanned in results may not display below. Preventive Screenings:  Service Recommendations Previous Testing/Comments   Colorectal Cancer Screening  · Colonoscopy    · Fecal Occult Blood Test (FOBT)/Fecal Immunochemical Test (FIT)  · Fecal DNA/Cologuard Test  · Flexible Sigmoidoscopy Age: 43-73 years old   Colonoscopy: every 10 years (May be performed more frequently if at higher risk)  OR  FOBT/FIT: every 1 year  OR  Cologuard: every 3 years  OR  Sigmoidoscopy: every 5 years  Screening may be recommended earlier than age 39 if at higher risk for colorectal cancer. Also, an individualized decision between you and your healthcare provider will decide whether screening between the ages of 77-80 would be appropriate.  Colonoscopy: Not on file  FOBT/FIT: Not on file  Cologuard: Not on file  Sigmoidoscopy: Not on file          Prostate Cancer Screening Individualized decision between patient and health care provider in men between ages of 53-66   Medicare will cover every 12 months beginning on the day after your 50th birthday PSA: 1.7 ng/mL     History Prostate Cancer  Screening Not Indicated     Hepatitis C Screening Once for adults born between 1945 and 1965  More frequently in patients at high risk for Hepatitis C Hep C Antibody: Not on file        Diabetes Screening 1-2 times per year if you're at risk for diabetes or have pre-diabetes Fasting glucose: 86 mg/dL (5/23/2023)  A1C: 5.4 % (5/23/2023)  Screening Current   Cholesterol Screening Once every 5 years if you don't have a lipid disorder. May order more often based on risk factors. Lipid panel: 05/23/2023  Screening Current      Other Preventive Screenings Covered by Medicare:  1. Abdominal Aortic Aneurysm (AAA) Screening: covered once if your at risk. You're considered to be at risk if you have a family history of AAA or a male between the age of 70-76 who smoking at least 100 cigarettes in your lifetime. 2. Lung Cancer Screening: covers low dose CT scan once per year if you meet all of the following conditions: (1) Age 48-67; (2) No signs or symptoms of lung cancer; (3) Current smoker or have quit smoking within the last 15 years; (4) You have a tobacco smoking history of at least 20 pack years (packs per day x number of years you smoked); (5) You get a written order from a healthcare provider. 3. Glaucoma Screening: covered annually if you're considered high risk: (1) You have diabetes OR (2) Family history of glaucoma OR (3)  aged 48 and older OR (3)  American aged 72 and older  3. Osteoporosis Screening: covered every 2 years if you meet one of the following conditions: (1) Have a vertebral abnormality; (2) On glucocorticoid therapy for more than 3 months; (3) Have primary hyperparathyroidism; (4) On osteoporosis medications and need to assess response to drug therapy. 5. HIV Screening: covered annually if you're between the age of 14-79. Also covered annually if you are younger than 13 and older than 72 with risk factors for HIV infection. For pregnant patients, it is covered up to 3 times per pregnancy.     Immunizations:  Immunization Recommendations   Influenza Vaccine Annual influenza vaccination during flu season is recommended for all persons aged >= 6 months who do not have contraindications   Pneumococcal Vaccine   * Pneumococcal conjugate vaccine = PCV13 (Prevnar 13), PCV15 (Vaxneuvance), PCV20 (Prevnar 20)  * Pneumococcal polysaccharide vaccine = PPSV23 (Pneumovax) Adults 2364 years old: 1-3 doses may be recommended based on certain risk factors  Adults 72 years old: 1-2 doses may be recommended based off what pneumonia vaccine you previously received   Hepatitis B Vaccine 3 dose series if at intermediate or high risk (ex: diabetes, end stage renal disease, liver disease)   Tetanus (Td) Vaccine - COST NOT COVERED BY MEDICARE PART B Following completion of primary series, a booster dose should be given every 10 years to maintain immunity against tetanus. Td may also be given as tetanus wound prophylaxis. Tdap Vaccine - COST NOT COVERED BY MEDICARE PART B Recommended at least once for all adults. For pregnant patients, recommended with each pregnancy. Shingles Vaccine (Shingrix) - COST NOT COVERED BY MEDICARE PART B  2 shot series recommended in those aged 48 and above     Health Maintenance Due:  There are no preventive care reminders to display for this patient. Immunizations Due:      Topic Date Due   • Hepatitis A Vaccine (1 of 2 - Risk 2-dose series) Never done   • Hepatitis B Vaccine (1 of 3 - Risk 3-dose series) Never done   • COVID-19 Vaccine (2 - Moderna series) 06/10/2021   • Influenza Vaccine (1) 09/01/2023     Advance Directives   What are advance directives? Advance directives are legal documents that state your wishes and plans for medical care. These plans are made ahead of time in case you lose your ability to make decisions for yourself. Advance directives can apply to any medical decision, such as the treatments you want, and if you want to donate organs. What are the types of advance directives? There are many types of advance directives, and each state has rules about how to use them. You may choose a combination of any of the following:  · Living will: This is a written record of the treatment you want. You can also choose which treatments you do not want, which to limit, and which to stop at a certain time.  This includes surgery, medicine, IV fluid, and tube feedings. · Durable power of  for healthcare [facility]): This is a written record that states who you want to make healthcare choices for you when you are unable to make them for yourself. This person, called a proxy, is usually a family member or a friend. You may choose more than 1 proxy. · Do not resuscitate (DNR) order:  A DNR order is used in case your heart stops beating or you stop breathing. It is a request not to have certain forms of treatment, such as CPR. A DNR order may be included in other types of advance directives. · Medical directive: This covers the care that you want if you are in a coma, near death, or unable to make decisions for yourself. You can list the treatments you want for each condition. Treatment may include pain medicine, surgery, blood transfusions, dialysis, IV or tube feedings, and a ventilator (breathing machine). · Values history: This document has questions about your views, beliefs, and how you feel and think about life. This information can help others choose the care that you would choose. Why are advance directives important? An advance directive helps you control your care. Although spoken wishes may be used, it is better to have your wishes written down. Spoken wishes can be misunderstood, or not followed. Treatments may be given even if you do not want them. An advance directive may make it easier for your family to make difficult choices about your care. Fall Prevention    Fall prevention  includes ways to make your home and other areas safer. It also includes ways you can move more carefully to prevent a fall. Health conditions that cause changes in your blood pressure, vision, or muscle strength and coordination may increase your risk for falls. Medicines may also increase your risk for falls if they make you dizzy, weak, or sleepy. Fall prevention tips:   · Stand or sit up slowly. · Use assistive devices as directed. · Wear shoes that fit well and have soles that . · Wear a personal alarm. · Stay active. · Manage your medical conditions. Home Safety Tips:  · Add items to prevent falls in the bathroom. · Keep paths clear. · Install bright lights in your home. · Keep items you use often on shelves within reach. · Paint or place reflective tape on the edges of your stairs. How to Quit Using Smokeless Tobacco   Why it is important to stop using smokeless tobacco:  Smokeless tobacco comes in many forms. Examples include chew, snuff, dip, dissolvable tobacco, and snus. All smokeless tobacco products contain nicotine and may contain as much nicotine as 3 cigarettes. You may be physically dependent on nicotine. You may also be emotionally addicted to it. The cravings can be strong, but it is important to quit using smokeless tobacco. You will improve your health and decrease your cancer, stroke, and heart attack risk. Mouth sores and tooth problems will also improve when you quit. You can benefit from quitting no matter how long you have used smokeless tobacco.   Prepare to stop using smokeless tobacco:  Nicotine is a highly addictive drug. Withdrawal symptoms can happen when you stop and make it hard to quit. The following can help keep you on track:  · Set a quit date. · Tell friends, family, and coworkers that you plan to quit. · Remove all smokeless tobacco products from your home, car, and workplace. Manage weight gain after you quit:  Nicotine can affect your metabolism. You may gain a few pounds after you quit. The following can help you control your weight:  · Eat healthy foods. · Drink water before, during, and between meals. · Exercise as directed. Weight Management   Why it is important to manage your weight:  Being overweight increases your risk of health conditions such as heart disease, high blood pressure, type 2 diabetes, and certain types of cancer.  It can also increase your risk for osteoarthritis, sleep apnea, and other respiratory problems. Aim for a slow, steady weight loss. Even a small amount of weight loss can lower your risk of health problems. How to lose weight safely:  A safe and healthy way to lose weight is to eat fewer calories and get regular exercise. You can lose up about 1 pound a week by decreasing the number of calories you eat by 500 calories each day. Healthy meal plan for weight management:  A healthy meal plan includes a variety of foods, contains fewer calories, and helps you stay healthy. A healthy meal plan includes the following:  · Eat whole-grain foods more often. A healthy meal plan should contain fiber. Fiber is the part of grains, fruits, and vegetables that is not broken down by your body. Whole-grain foods are healthy and provide extra fiber in your diet. Some examples of whole-grain foods are whole-wheat breads and pastas, oatmeal, brown rice, and bulgur. · Eat a variety of vegetables every day. Include dark, leafy greens such as spinach, kale, krissy greens, and mustard greens. Eat yellow and orange vegetables such as carrots, sweet potatoes, and winter squash. · Eat a variety of fruits every day. Choose fresh or canned fruit (canned in its own juice or light syrup) instead of juice. Fruit juice has very little or no fiber. · Eat low-fat dairy foods. Drink fat-free (skim) milk or 1% milk. Eat fat-free yogurt and low-fat cottage cheese. Try low-fat cheeses such as mozzarella and other reduced-fat cheeses. · Choose meat and other protein foods that are low in fat. Choose beans or other legumes such as split peas or lentils. Choose fish, skinless poultry (chicken or turkey), or lean cuts of red meat (beef or pork). Before you cook meat or poultry, cut off any visible fat. · Use less fat and oil. Try baking foods instead of frying them.  Add less fat, such as margarine, sour cream, regular salad dressing and mayonnaise to foods. Eat fewer high-fat foods. Some examples of high-fat foods include french fries, doughnuts, ice cream, and cakes. · Eat fewer sweets. Limit foods and drinks that are high in sugar. This includes candy, cookies, regular soda, and sweetened drinks. Exercise:  Exercise at least 30 minutes per day on most days of the week. Some examples of exercise include walking, biking, dancing, and swimming. You can also fit in more physical activity by taking the stairs instead of the elevator or parking farther away from stores. Ask your healthcare provider about the best exercise plan for you. © Copyright [employer] 2018 Information is for End User's use only and may not be sold, redistributed or otherwise used for commercial purposes.  All illustrations and images included in CareNotes® are the copyrighted property of [employer].A[employer]M., Inc. or [address] [place] [address]

## 2023-07-17 NOTE — PROGRESS NOTES
Assessment and Plan:     Problem List Items Addressed This Visit        Nervous and Auditory    Mild vascular dementia without behavioral disturbance, psychotic disturbance, mood disturbance, or anxiety ([address]) - Primary       Other    Syncope    Diastolic dysfunction   Other Visit Diagnoses     Constipation, unspecified constipation type        Seasonal allergies        Medicare annual wellness visit, initial               Preventive health issues were discussed with patient, and age appropriate screening tests were ordered as noted in patient's After Visit Summary. Personalized health advice and appropriate referrals for health education or preventive services given if needed, as noted in patient's After Visit Summary.      History of Present Illness:     Patient presents for a Medicare Wellness Visit    HPI   Patient Care Team:  Octavio Watkins DO as PCP - General (Internal Medicine)     Review of Systems:     Review of Systems     Problem List:     Patient Active Problem List   Diagnosis   • Benign prostatic hyperplasia with incomplete bladder emptying   • Essential hypertension   • Obstructive uropathy   • Bilateral sensorineural hearing loss   • Mild cognitive impairment   • Osteoarthritis   • Stage 4 chronic kidney disease ([address])   • Urinary retention   • History of CVA (cerebrovascular accident)   • Syncope   • Former smoker   • Chews tobacco   • Suprapubic catheter ([address])   • Mild vascular dementia without behavioral disturbance, psychotic disturbance, mood disturbance, or anxiety ([address])   • Platelets decreased ([address])   • Malignant neoplasm of prostate ([address])   • Diastolic dysfunction      Past Medical and Surgical History:     Past Medical History:   Diagnosis Date   • Hypertension    • Urinary retention      Past Surgical History:   Procedure Laterality Date   • CATARACT EXTRACTION     • HERNIA REPAIR     • HIATAL HERNIA REPAIR     • FL TRURL ELECTROSURG RESCJ PROSTATE BLEED COMPLETE N/A 7/22/2021    Procedure: TRANSURETHRAL RESECTION OF PROSTATE (TURP), cystoscopy;  Surgeon: Zulma Ryan MD;  Location: [facility];  Service: Urology   • SHOULDER SURGERY Left    • SUPRAPUBIC TUBE PLACEMENT N/A 7/22/2021    Procedure: INSERTION SUPRAPUBIC CATHETER PERCUTANEOUS;  Surgeon: Zulma Ryan MD;  Location: [facility];  Service: Urology      Family History:     History reviewed. No pertinent family history. Social History:     Social History     Socioeconomic History   • Marital status:      Spouse name: None   • Number of children: None   • Years of education: None   • Highest education level: None   Occupational History   • None   Tobacco Use   • Smoking status: Never     Passive exposure: Never   • Smokeless tobacco: Current     Types: Chew   Vaping Use   • Vaping Use: Never used   Substance and Sexual Activity   • Alcohol use: Never   • Drug use: Never   • Sexual activity: Not Currently   Other Topics Concern   • None   Social History Narrative        Four or five children    Lives with son    Ex [employer]    Retired maintenance. Social Determinants of Health     Financial Resource Strain: Low Risk  (7/17/2023)    Overall Financial Resource Strain (CARDIA)    • Difficulty of Paying Living Expenses: Not hard at all   Food Insecurity: No Food Insecurity (5/23/2023)    Hunger Vital Sign    • Worried About Running Out of Food in the Last Year: Never true    • Ran Out of Food in the Last Year: Never true   Transportation Needs: No Transportation Needs (7/17/2023)    PRAPARE - Transportation    • Lack of Transportation (Medical): No    • Lack of Transportation (Non-Medical):  No   Physical Activity: Not on file   Stress: Not on file   Social Connections: Not on file   Intimate Partner Violence: Not on file   Housing Stability: Low Risk  (5/23/2023)    Housing Stability Vital Sign    • Unable to Pay for Housing in the Last Year: No    • Number of Places Lived in the Last Year: 1    • Unstable Housing in the Last Year: No Medications and Allergies:     Current Outpatient Medications   Medication Sig Dispense Refill   • aspirin 81 mg chewable tablet Chew 1 tablet (81 mg total) daily 30 tablet 5   • atorvastatin (LIPITOR) 40 mg tablet Take 1 tablet (40 mg total) by mouth every evening 30 tablet 5   • cholecalciferol (VITAMIN D3) 1,000 units tablet Take 2,000 Units by mouth daily     • donepezil (ARICEPT) 5 mg tablet Take 1 tablet (5 mg total) by mouth daily at bedtime 90 tablet 1   • finasteride (PROSCAR) 5 mg tablet Take 1 tablet (5 mg total) by mouth daily 90 tablet 2   • levocetirizine (XYZAL) 5 MG tablet Take 1 tablet (5 mg total) by mouth every evening 90 tablet 1   • oxybutynin (DITROPAN) 5 mg tablet Take 1 tablet (5 mg total) by mouth 2 (two) times a day as needed (bladder spasms) 20 tablet 11   • polyethylene glycol (GLYCOLAX) 17 GM/SCOOP powder Take 17 g by mouth daily 850 g 1   • tamsulosin (FLOMAX) 0.4 mg Take 2 capsules (0.8 mg total) by mouth daily with dinner 180 capsule 3     No current facility-administered medications for this visit. No Known Allergies   Immunizations:     Immunization History   Administered Date(s) Administered   • COVID-19 MODERNA VACC 0.5 ML IM 04/15/2021   • INFLUENZA 10/01/2018, 12/24/2020   • Pneumococcal Conjugate 13-Valent 07/27/2018   • Pneumococcal Polysaccharide PPV23 08/28/2019   • Tdap 07/27/2018   • Zoster Vaccine Recombinant 12/24/2020, 09/01/2022   • influenza, trivalent, adjuvanted 10/02/2019      Health Maintenance: There are no preventive care reminders to display for this patient. Topic Date Due   • Hepatitis A Vaccine (1 of 2 - Risk 2-dose series) Never done   • Hepatitis B Vaccine (1 of 3 - Risk 3-dose series) Never done   • COVID-19 Vaccine (2 - Moderna series) 06/10/2021   • Influenza Vaccine (1) 09/01/2023      Medicare Screening Tests and Risk Assessments:     Kasia Liu is here for his Initial Wellness visit.  Last Medicare Wellness visit information reviewed, patient interviewed and updates made to the record today. Health Risk Assessment:   Patient rates overall health as poor. Patient feels that their physical health rating is same. Patient is dissatisfied with their life. Eyesight was rated as slightly worse. Hearing was rated as much worse. Patient feels that their emotional and mental health rating is slightly worse. Patient states they are always unusually tired/fatigued. Pain experienced in the last 7 days has been none. Patient states that he has experienced no weight loss or gain in last 6 months. Fall Risk Screening: In the past year, patient has experienced: history of falling in past year    Number of falls: 1  Injured during fall?: Yes    Feels unsteady when standing or walking?: Yes    Worried about falling?: Yes      Home Safety:  Patient has trouble with stairs inside or outside of their home. Patient has no working smoke alarms and has no working carbon monoxide detector. Home safety hazards include: medications that cause fatigue. Nutrition:   Current diet is Regular. Medications:   Patient is currently taking over-the-counter supplements. OTC medications include: see medication list. Patient is not able to manage medications. Activities of Daily Living (ADLs)/Instrumental Activities of Daily Living (IADLs):   Walk and transfer into and out of bed and chair?: Yes  Dress and groom yourself?: Yes    Bathe or shower yourself?: No    Feed yourself?  Yes  Do your laundry/housekeeping?: Yes  Manage your money, pay your bills and track your expenses?: No  Make your own meals?: No    Do your own shopping?: No    Previous Hospitalizations:   Any hospitalizations or ED visits within the last 12 months?: Yes      Advance Care Planning:   Living will: No    Durable POA for healthcare: No    Advanced directive: No    Advanced directive counseling given: Yes    Five wishes given: Yes    Patient declined ACP directive: No    End of Life Decisions reviewed with patient: Yes    Provider agrees with end of life decisions: Yes      Comments: Will review further once pt reviews the info given today. PREVENTIVE SCREENINGS      Cardiovascular Screening:    General: Screening Current      Diabetes Screening:     General: Screening Current      Colorectal Cancer Screening:     General: Screening Not Indicated      Prostate Cancer Screening:    General: History Prostate Cancer and Screening Not Indicated      Osteoporosis Screening:    General: Screening Not Indicated      Abdominal Aortic Aneurysm (AAA) Screening:        General: Screening Not Indicated      Lung Cancer Screening:     General: Screening Not Indicated      Hepatitis C Screening:    General: Screening Not Indicated    Screening, Brief Intervention, and Referral to Treatment (SBIRT)    Screening  Typical number of drinks in a day: 0  Typical number of drinks in a week: 0  Interpretation: Low risk drinking behavior. Single Item Drug Screening:  How often have you used an illegal drug (including marijuana) or a prescription medication for non-medical reasons in the past year? never    Single Item Drug Screen Score: 0  Interpretation: Negative screen for possible drug use disorder    Other Counseling Topics:   Car/seat belt/driving safety, sunscreen and regular weightbearing exercise and calcium and vitamin D intake. No results found. Physical Exam:     /60 (BP Location: Left arm, Patient Position: Sitting, Cuff Size: Standard)   Pulse 71   Temp 99.4 °F (37.4 °C)   Ht 5' 4" (1.626 m)   Wt 78.3 kg (172 lb 9.6 oz)   SpO2 96%   BMI 29.63 kg/m²     Physical Exam   A/P: Doing well and no falls. Denies depression and feels safe at home. Diverse diet. Doesn't drive, but uses seat belts. No living will or POA. Information give for pt to review. No DME or referrals needed today. RTC in one year for medicare wellness.      Christos Geronimo DO

## 2023-07-17 NOTE — TELEPHONE ENCOUNTER
I saw patient for HFU last week. He had evidence of prior strokes on imaging and also cognitive impairment. He is currently getting therapies and nursing from [facility]. He lives with his son who would be interested in learning about any services his father may qualify for to increase help at home. I am most concerned about medication administration, as son puts pills in a weekly pill box for him but often does not take them, or takes them from the wrong day (son works long hours and often not home). If you could reach out to his son and provide any info, that would be great. I also referred to [facility]. Thanks!

## 2023-07-18 NOTE — TELEPHONE ENCOUNTER
MSW attempted to reach patient's son, Aniy Villareal, at [phone] to discuss options for in home care. No answer. MSW left a message requesting callback. Awaiting same.

## 2023-07-19 NOTE — TELEPHONE ENCOUNTER
MSW attempted to reach patient's son, Alanna Arteaga, at [phone] to discuss options for in home care. No answer. MSW left a message requesting callback. Awaiting same.

## 2023-07-20 ENCOUNTER — TELEPHONE (OUTPATIENT)
Age: 83
End: 2023-07-20

## 2023-07-20 NOTE — TELEPHONE ENCOUNTER
LATE ENTRY FROM 7/19/23:    MSW received a callback from patient’s son, Alexa Molina. Alexa Molina stated that patient is living with him. Alexa Molina stated that patient can be quite cranky but he’s currently doing better with services from [facility]. Alexa Molina is aware that [facility] services will not be long-term so he is looking to explore options for other in-home assistance as patient does not allow him to assist him. Alexa Molina stated that he does not have a close relationship with patient. Alexa Molina stated that he has to give patient reminders to shower, and that patient can get upset if Alexa Molina gives him too many prompts to shower. MSW discuss the following options:  1. Long-term care insurance - Alexa Molina stated that he is is not aware of patient having a plan like this. 2. MSW inquired if patient is a . Alexa Molina stated that he is and that he did serve during war time, but never left [place]. Alexa Molina was given the phone number for [facility] office as [phone] to explore if patient would be eligible for any benefits through the [address] [place] [address] based upon his service connectedness. 3. Referral to the [facility] on [facility] to see if patient would qualify for any services. MSW advised that typically patient would need to require physical assistance to be eligible for services. 4. Referral to the [facility] for waiver services. MSW provided the income and asset guidelines for the program. Alexa Molina stated that patient’s income would be under the threshold and that he does not have any savings. Alexa Molina stated that patient is physically capable, so he’s not sure if he would qualify. MSW advised they do take into account patient's cognitive limitations as well. 5. Private duty services.  MSW advised that this writer can provide a listing of private duty agencies that they can hire to provide companionship, personal care, light housekeeping,etc. MSW advised that cost for these services through an agency can range between $25-$30 an hour. Wale Cifuentes stated that this may be possible as he does not charge patient rent to live in his house and that patient could use his income to pay for needed services. 6. Adult day centers. MSW advised that there is currently no adult day program in [facility] but that residents from [facility] have been getting bussed to the [facility] Adult Day center. MSW did advise that there is a daily charge but the program is open typically 5 days a week from morning until afternoon and offers supervision, activities, meals, and that staff  can administer medications. Wale Cifuentes stated that patient previously attended the rec center in [facility] and did not enjoy the socialization aspect so he does not think this would be a good option for patient. Wale Cifuentes advised that he would like to contact the [employer] office to see if patient is eligible for any services. Wale Cifuentes stated if patient would not be eligible then he may be willing to consider waiver and or private pay services. Wale Cifuentes stated he will contact us writer after he makes contact with the [employer] office. MSW will await call.

## 2023-07-20 NOTE — TELEPHONE ENCOUNTER
Reached out to patient's son, Evelyn Liu ([phone]) to schedule assessment from Neurology referral.    Call was disconnected. Unable to re-connect. Left message on voicemail to contact this office to schedule an appointment.

## 2023-07-21 ENCOUNTER — CLINICAL SUPPORT (OUTPATIENT)
Dept: CARDIOLOGY CLINIC | Facility: CLINIC | Age: 83
End: 2023-07-21
Payer: MEDICARE

## 2023-07-21 DIAGNOSIS — Z86.73 HISTORY OF CVA (CEREBROVASCULAR ACCIDENT): ICD-10-CM

## 2023-07-21 DIAGNOSIS — R55 SYNCOPE, UNSPECIFIED SYNCOPE TYPE: ICD-10-CM

## 2023-07-21 PROCEDURE — 93246 EXT ECG>7D<15D RECORDING: CPT | Performed by: INTERNAL MEDICINE

## 2023-07-25 NOTE — TELEPHONE ENCOUNTER
MSW attempted to reach patient's son, Temo Espinoza, at [phone] to follow-up to see if the [place] could provide any services for in home care. No answer. MSW left a message requesting callback. Awaiting same.

## 2023-07-31 NOTE — TELEPHONE ENCOUNTER
MSW attempted to reach patient's son, Corey Sapp, at [phone] to follow-up to see if the [place] could provide any services for in home care. No answer. MSW left a message requesting callback. Awaiting same.

## 2023-08-02 NOTE — TELEPHONE ENCOUNTER
MSW attempted to reach patient's son, Kristian Jorge, at [phone] to follow-up to see if the [place] could provide any services for in home care. No answer. MSW left a message requesting callback. Awaiting same. Since there have been 3 unsuccessful attempts to reach patient, an Unable to Reach letter will generated and placed in the mail to patient's home address.

## 2023-08-03 NOTE — TELEPHONE ENCOUNTER
Unable to Reach letter placed in the mail this date. MSW will be available should patient/son call back.

## 2023-08-10 ENCOUNTER — OFFICE VISIT (OUTPATIENT)
Dept: UROLOGY | Facility: CLINIC | Age: 83
End: 2023-08-10
Payer: MEDICARE

## 2023-08-10 VITALS
SYSTOLIC BLOOD PRESSURE: 126 MMHG | DIASTOLIC BLOOD PRESSURE: 68 MMHG | WEIGHT: 170 LBS | HEART RATE: 68 BPM | BODY MASS INDEX: 29.18 KG/M2

## 2023-08-10 DIAGNOSIS — R39.14 BENIGN PROSTATIC HYPERPLASIA WITH INCOMPLETE BLADDER EMPTYING: ICD-10-CM

## 2023-08-10 DIAGNOSIS — N40.1 BENIGN PROSTATIC HYPERPLASIA WITH INCOMPLETE BLADDER EMPTYING: ICD-10-CM

## 2023-08-10 DIAGNOSIS — R33.9 INCOMPLETE BLADDER EMPTYING: Primary | ICD-10-CM

## 2023-08-10 DIAGNOSIS — C61 MALIGNANT NEOPLASM OF PROSTATE (HCC): ICD-10-CM

## 2023-08-10 PROCEDURE — 51705 CHANGE OF BLADDER TUBE: CPT | Performed by: UROLOGY

## 2023-08-10 PROCEDURE — 99213 OFFICE O/P EST LOW 20 MIN: CPT | Performed by: UROLOGY

## 2023-08-10 RX ORDER — CEPHALEXIN 500 MG/1
500 CAPSULE ORAL ONCE
Qty: 1 CAPSULE | Refills: 0 | Status: SHIPPED | OUTPATIENT
Start: 2023-08-10 | End: 2023-08-10

## 2023-08-10 RX ORDER — TAMSULOSIN HYDROCHLORIDE 0.4 MG/1
0.8 CAPSULE ORAL
Qty: 180 CAPSULE | Refills: 3 | Status: SHIPPED | OUTPATIENT
Start: 2023-08-10

## 2023-08-10 NOTE — PATIENT INSTRUCTIONS
Have PSA done and I will send you the results. Let me know when the residuals after urinating are less than 100 cc consistently and we will remove the tube.

## 2023-08-10 NOTE — PROGRESS NOTES
[address]  [facility]  [address] [facility], [address] [place] [address] 121  [phone]  [email or web address]      NAME: Anne Dacosta  AGE: 81 y.o. SEX: male  : 1940   MRN: [id number]    DATE: 8/10/2023  TIME: 2:32 PM    Assessment and Plan: Incomplete bladder emptying, after TURP and SP tube placement by me 2 years ago. He had urinary retention with bilateral hydronephrosis and it appears that his bladder is regaining some function. Continue to measure postvoid residuals and when it is consistently less than 100 cc, the tube can be removed. Follow-up in 3 weeks for another tube change and reassessment. Check PSA as well for history of prostate cancer and send results. Chief Complaint   No chief complaint on file. History of Present Illness   21-year-old man with BPH with obstruction with urinary retention bilateral hydronephrosis, 2 L retention and acute injury who underwent cystoscopy and transurethral resection of prostate and SP tube placement by me 2021. Unfortunately it appears his bladder never woke up. He remains with a suprapubic tube and he had a CT scan of the chest abdomen pelvis May 22, 2023 which showed the SP tube in good position, normal kidneys no hydronephrosis and there was bladder wall thickening with ill-defined fat planes and multiple urinary bladder calcifications. Last PSA was 2021 and was normal at 1.7. He actually has been urinating more. Urinated in the office today with what seemed to be a pretty decent stream and then I changed his catheter and obtained 200 cc of cloudy urine. Therefore he is emptying better than he had been in the past.  No major changes in his health. The old suprapubic tube was removed intact by me, by deflating the balloon, then I placed a new 16 [place] catheter in place and obtained 200 cc. The balloon was inflated to 10 cc.   Catheter plug replaced. The following portions of the patient's history were reviewed and updated as appropriate: allergies, current medications, past family history, past medical history, past social history, past surgical history and problem list.  Past Medical History:   Diagnosis Date   • Hypertension    • Urinary retention      Past Surgical History:   Procedure Laterality Date   • CATARACT EXTRACTION     • HERNIA REPAIR     • HIATAL HERNIA REPAIR     • MT TRURL ELECTROSURG RESCJ PROSTATE BLEED COMPLETE N/A 7/22/2021    Procedure: TRANSURETHRAL RESECTION OF PROSTATE (TURP), cystoscopy;  Surgeon: Gisselle New MD;  Location: [facility];  Service: Urology   • SHOULDER SURGERY Left    • SUPRAPUBIC TUBE PLACEMENT N/A 7/22/2021    Procedure: INSERTION SUPRAPUBIC CATHETER PERCUTANEOUS;  Surgeon: Gisselle New MD;  Location: [facility];  Service: Urology     shoulder  Review of Systems   Review of Systems   Constitutional: Negative for fever. Respiratory: Negative for shortness of breath. Genitourinary:        His SP tube       Active Problem List     Patient Active Problem List   Diagnosis   • Benign prostatic hyperplasia with incomplete bladder emptying   • Essential hypertension   • Obstructive uropathy   • Bilateral sensorineural hearing loss   • Mild cognitive impairment   • Osteoarthritis   • Stage 4 chronic kidney disease (HCC)   • Urinary retention   • History of CVA (cerebrovascular accident)   • Syncope   • Former smoker   • Chews tobacco   • Suprapubic catheter ([address])   • Mild vascular dementia without behavioral disturbance, psychotic disturbance, mood disturbance, or anxiety (HCC)   • Platelets decreased (HCC)   • Malignant neoplasm of prostate (HCC)   • Diastolic dysfunction       Objective   /68   Pulse 68   Wt 77.1 kg (170 lb)   BMI 29.18 kg/m²     Physical Exam  Vitals reviewed. Constitutional:       Appearance: Normal appearance. Eyes:      Extraocular Movements: Extraocular movements intact. Pulmonary:      Effort: Pulmonary effort is normal.   Abdominal:      Palpations: Abdomen is soft. Comments: SP tube site normal   Musculoskeletal:         General: Normal range of motion. Skin:     Coloration: Skin is not jaundiced or pale. Neurological:      General: No focal deficit present. Mental Status: He is alert. Psychiatric:         Mood and Affect: Mood normal.         Behavior: Behavior normal.         Thought Content:  Thought content normal.         Judgment: Judgment normal.             Current Medications     Current Outpatient Medications:   •  aspirin 81 mg chewable tablet, Chew 1 tablet (81 mg total) daily, Disp: 30 tablet, Rfl: 5  •  atorvastatin (LIPITOR) 40 mg tablet, Take 1 tablet (40 mg total) by mouth every evening, Disp: 30 tablet, Rfl: 5  •  cholecalciferol (VITAMIN D3) 1,000 units tablet, Take 2,000 Units by mouth daily, Disp: , Rfl:   •  donepezil (ARICEPT) 5 mg tablet, Take 1 tablet (5 mg total) by mouth daily at bedtime, Disp: 90 tablet, Rfl: 1  •  finasteride (PROSCAR) 5 mg tablet, Take 1 tablet (5 mg total) by mouth daily, Disp: 90 tablet, Rfl: 2  •  levocetirizine (XYZAL) 5 MG tablet, Take 1 tablet (5 mg total) by mouth every evening, Disp: 90 tablet, Rfl: 1  •  oxybutynin (DITROPAN) 5 mg tablet, Take 1 tablet (5 mg total) by mouth 2 (two) times a day as needed (bladder spasms), Disp: 20 tablet, Rfl: 11  •  polyethylene glycol (GLYCOLAX) 17 GM/SCOOP powder, Take 17 g by mouth daily, Disp: 850 g, Rfl: 1  •  tamsulosin (FLOMAX) 0.4 mg, Take 2 capsules (0.8 mg total) by mouth daily with dinner, Disp: 180 capsule, Rfl: 3        Kit Santo MD

## 2023-09-01 ENCOUNTER — TELEPHONE (OUTPATIENT)
Dept: UROLOGY | Facility: CLINIC | Age: 83
End: 2023-09-01

## 2023-09-01 NOTE — TELEPHONE ENCOUNTER
Called and spoke with pt son to R/s appt pt's son was busy at this time and requested a call back.     Please contact and confirm new time and  date for nurse visit

## 2023-09-06 NOTE — TELEPHONE ENCOUNTER
Called and left VM for patients son Kirti Garcia with patients nurse visit appointment date and time for next SPT change. Asked son to call back only if he needs to re-schedule appointment.

## 2023-09-11 ENCOUNTER — PROCEDURE VISIT (OUTPATIENT)
Dept: UROLOGY | Facility: CLINIC | Age: 83
End: 2023-09-11
Payer: MEDICARE

## 2023-09-11 VITALS
HEART RATE: 70 BPM | WEIGHT: 169.8 LBS | DIASTOLIC BLOOD PRESSURE: 74 MMHG | SYSTOLIC BLOOD PRESSURE: 112 MMHG | HEIGHT: 64 IN | BODY MASS INDEX: 28.99 KG/M2

## 2023-09-11 DIAGNOSIS — R33.9 URINARY RETENTION: ICD-10-CM

## 2023-09-11 DIAGNOSIS — R33.9 INCOMPLETE BLADDER EMPTYING: ICD-10-CM

## 2023-09-11 DIAGNOSIS — N32.89 BLADDER SPASM: ICD-10-CM

## 2023-09-11 DIAGNOSIS — R39.14 BENIGN PROSTATIC HYPERPLASIA WITH INCOMPLETE BLADDER EMPTYING: Primary | ICD-10-CM

## 2023-09-11 DIAGNOSIS — N40.1 BENIGN PROSTATIC HYPERPLASIA WITH INCOMPLETE BLADDER EMPTYING: Primary | ICD-10-CM

## 2023-09-11 PROCEDURE — 51705 CHANGE OF BLADDER TUBE: CPT

## 2023-09-11 NOTE — PROGRESS NOTES
I supervised the Advanced Practitioner. I reviewed the Advanced Practitioner note and agree.     Zabrina Ball MD 09/11/23

## 2023-09-11 NOTE — PROGRESS NOTES
9/11/2023    Lilliam Alonzo  1940  [id number]    Diagnosis: BPH with incomplete bladder emptying, bladder spasms  Chief Complaint    SPT change         Patient presents for routine SPT exchange managed by Dr. Svetlana Forbes. Patient reports he is still having large volume residuals from SPT, although voiding some via urethra. Advised to try to measure output, however patient often forgets. Plan  Follow up in 3 weeks for next SPT change      Procedure: Suprapubic Tube Change         Cystostomy tube change     Date/Time 9/11/2023 11:00 AM     Performed by  Timothy Avina RN   Authorized by Zabrina Ball MD     Universal Protocol   Consent: Verbal consent obtained. Risks and benefits: risks, benefits and alternatives were discussed  Consent given by: patient  Patient understanding: patient states understanding of the procedure being performed  Patient identity confirmed: verbally with patient        Local anesthesia used: no     Anesthesia   Local anesthesia used: no     Sedation   Patient sedated: no        Specimen: no    Culture: no   Procedure Details   Patient tolerance: patient tolerated the procedure well with no immediate complications           Current catheter removed without difficulty after deflation of an intact balloon. Site prepped with Betadine, new 18F  suprapubic spt change via aseptic technique without incident, 10 ml balloon inflated with sterile water. Irrigated easily for straw-yellow return, mild spasm noted. Patient tolerated well. Catheter plug attached.      Vitals:    09/11/23 1059   BP: 112/74   Pulse: 70   Weight: 77 kg (169 lb 12.8 oz)   Height: 5' 4" (1.626 m)         Timothy Avina RN

## 2023-10-04 ENCOUNTER — TELEPHONE (OUTPATIENT)
Dept: UROLOGY | Facility: CLINIC | Age: 83
End: 2023-10-04

## 2023-10-04 NOTE — TELEPHONE ENCOUNTER
Patient cancelled nurse visit today for SPT change and did not re-schedule. Called and left VM for patients son, Shannan Frankel, to please return call to re-schedule nurse visit for SPT change in the [facility] office.

## 2023-10-05 NOTE — TELEPHONE ENCOUNTER
2nd voicemail left for patients son to return call to re-schedule patients nurse visit for SPT exchange.

## 2023-10-06 NOTE — TELEPHONE ENCOUNTER
3rd voicemail left for patients son to return call to re-schedule patients nurse visit for SPT change. Please assist with scheduling if he returns call.

## 2023-10-09 ENCOUNTER — PROCEDURE VISIT (OUTPATIENT)
Dept: UROLOGY | Facility: CLINIC | Age: 83
End: 2023-10-09
Payer: COMMERCIAL

## 2023-10-09 VITALS
WEIGHT: 165.4 LBS | DIASTOLIC BLOOD PRESSURE: 64 MMHG | HEIGHT: 64 IN | BODY MASS INDEX: 28.24 KG/M2 | HEART RATE: 74 BPM | SYSTOLIC BLOOD PRESSURE: 118 MMHG

## 2023-10-09 DIAGNOSIS — N32.89 BLADDER SPASM: ICD-10-CM

## 2023-10-09 DIAGNOSIS — R33.9 INCOMPLETE BLADDER EMPTYING: Primary | ICD-10-CM

## 2023-10-09 DIAGNOSIS — N40.1 BPH WITH OBSTRUCTION/LOWER URINARY TRACT SYMPTOMS: ICD-10-CM

## 2023-10-09 DIAGNOSIS — N13.8 BPH WITH OBSTRUCTION/LOWER URINARY TRACT SYMPTOMS: ICD-10-CM

## 2023-10-09 PROCEDURE — 51705 CHANGE OF BLADDER TUBE: CPT

## 2023-10-09 NOTE — PROGRESS NOTES
10/9/2023    Chuck Álvarez  1940  [id number]    Diagnosis: Incomplete bladder emptying, BPH, Bladder spasms   Chief Complaint    SPT change         Patient presents for routine SPT exchange managed by Dr. [facility]  Follow up in 4 weeks for next SPT change    Patient reports urinating through his urethra, however still draining moderate to large amounts from SPT    Son reports patient stopped taking all of his medications- refuses to take them and doesn't give a reason    Procedure: Suprapubic Tube Change         Cystostomy tube change     Date/Time 10/9/2023 1:00 PM     Performed by  Jerral Sicard, RN   Authorized by Tom Heath MD     Universal Protocol   Consent: Verbal consent obtained. Risks and benefits: risks, benefits and alternatives were discussed  Consent given by: patient  Patient understanding: patient states understanding of the procedure being performed  Patient identity confirmed: verbally with patient        Local anesthesia used: no     Anesthesia   Local anesthesia used: no     Sedation   Patient sedated: no        Specimen: no    Culture: no   Procedure Details   Patient tolerance: patient tolerated the procedure well with no immediate complications           Current catheter removed without difficulty after deflation of an intact balloon. Site prepped with Betadine, new 18F  suprapubic spt change via aseptic technique without incident, 10 ml balloon inflated with sterile water. Irrigated easily for clear return, mild spasm noted. Patient tolerated well.  Catheter plug attached     Vitals:    10/09/23 1309   BP: 118/64   Pulse: 74   Weight: 75 kg (165 lb 6.4 oz)   Height: 5' 4" (1.626 m)         Jerral Sicard, RN

## 2023-10-10 ENCOUNTER — OFFICE VISIT (OUTPATIENT)
Dept: PODIATRY | Facility: CLINIC | Age: 83
End: 2023-10-10
Payer: MEDICARE

## 2023-10-10 ENCOUNTER — APPOINTMENT (OUTPATIENT)
Dept: LAB | Facility: CLINIC | Age: 83
End: 2023-10-10
Payer: MEDICARE

## 2023-10-10 VITALS
HEART RATE: 88 BPM | DIASTOLIC BLOOD PRESSURE: 75 MMHG | HEIGHT: 64 IN | SYSTOLIC BLOOD PRESSURE: 125 MMHG | BODY MASS INDEX: 28.17 KG/M2 | WEIGHT: 165 LBS

## 2023-10-10 DIAGNOSIS — I87.2 VENOUS INSUFFICIENCY OF BOTH LOWER EXTREMITIES: Primary | ICD-10-CM

## 2023-10-10 DIAGNOSIS — B35.1 ONYCHOMYCOSIS: ICD-10-CM

## 2023-10-10 DIAGNOSIS — C61 MALIGNANT NEOPLASM OF PROSTATE (HCC): ICD-10-CM

## 2023-10-10 LAB — PSA SERPL-MCNC: 3.66 NG/ML (ref 0–4)

## 2023-10-10 PROCEDURE — 84153 ASSAY OF PSA TOTAL: CPT

## 2023-10-10 PROCEDURE — 36415 COLL VENOUS BLD VENIPUNCTURE: CPT

## 2023-10-10 PROCEDURE — 11721 DEBRIDE NAIL 6 OR MORE: CPT | Performed by: STUDENT IN AN ORGANIZED HEALTH CARE EDUCATION/TRAINING PROGRAM

## 2023-10-10 NOTE — PROGRESS NOTES
I supervised the Advanced Practitioner. I reviewed the Advanced Practitioner note and agree.     Eladio Curran MD 10/10/23

## 2023-10-10 NOTE — PROGRESS NOTES
Assessment/Plan:    No problem-specific Assessment & Plan notes found for this encounter. Diagnoses and all orders for this visit:    Venous insufficiency of both lower extremities    Onychomycosis          Plan:      1. A thorough neurovascular exam was performed. Patient presents for at-risk foot care. Patient has no acute concerns today. Patient has significant lower extremity risk due to neuropathy, parasthesia, edema, and trophic skin changes to the lower extremity. Patient has Q9  findings and is recommended for at risk foot care every 3 months.     2. All 10 toenails were debridement as follow: using nail nipper, haroon, and curette, nails were sharply debrided, reduced in thickness and length. Devitalized nail tissue and fungal debris excised and removed. Patient tolerated well.       3. Patient was educated on importance of daily foot assessment and proper shoe gear. Educational materials were provided. Recommended to wear compression stockings during the day for edema control.     4. Call if any questions or occurrence of any foot and ankle related complications      5. Return in 10-12 weeks.        Subjective:      Patient ID: Jose R Savage is a 81 y.o. male. HPI:  Jose R Savage is a 81 y.o. male who presents with painful, elongated toenails. They have difficulty applying their socks and shoes due to the elongation of the nails. The pressure within their shoe gear is painful and they have been unable to cut their nails adequately. Patient states pain is 1/10 in shoe gear. Pain with pressure. Requires at risk foot care. The following portions of the patient's history were reviewed and updated as appropriate:   He  has a past medical history of Hypertension and Urinary retention.   He   Patient Active Problem List    Diagnosis Date Noted   • Diastolic dysfunction 29/21/1144   • Former smoker 06/14/2023   • Chews tobacco 06/14/2023   • Suprapubic catheter ([address]) 06/14/2023   • Mild vascular dementia without behavioral disturbance, psychotic disturbance, mood disturbance, or anxiety ([address]) 06/14/2023   • Platelets decreased ([address]) 06/14/2023   • Malignant neoplasm of prostate ([address]) 06/14/2023   • Bilateral sensorineural hearing loss 05/22/2023   • Mild cognitive impairment 05/22/2023   • Osteoarthritis 05/22/2023   • Stage 4 chronic kidney disease ([address]) 05/22/2023   • Urinary retention 05/22/2023   • History of CVA (cerebrovascular accident) 05/22/2023   • Syncope 05/22/2023   • Obstructive uropathy 06/15/2021   • Benign prostatic hyperplasia with incomplete bladder emptying 05/24/2021   • Essential hypertension 05/24/2021     He  has a past surgical history that includes Shoulder surgery (Left); Hiatal hernia repair; Cataract extraction; Hernia repair; pr trurl electrosurg rescj prostate bleed complete (N/A, 7/22/2021); and Suprapubic tube placement (N/A, 7/22/2021). .    Review of Systems   All other systems reviewed and are negative. Objective:      /75 (BP Location: Right arm, Patient Position: Sitting, Cuff Size: Standard)   Pulse 88   Ht 5' 4" (1.626 m)   Wt 74.8 kg (165 lb)   BMI 28.32 kg/m²          Physical Exam  Vitals reviewed. Feet:      Comments: On exam patient has thickened, hypertrophic, discolored, brittle toenails with subungual debris and tenderness x10.  Patient has lower extremity edema  PAtients skin is atrophic, thickened nails, and decreased pedal hair  Patient has decreased pinprick and vibratory sensation to his feet and parasthesia

## 2023-10-11 DIAGNOSIS — R97.20 RISING PSA LEVEL: Primary | ICD-10-CM

## 2023-10-12 ENCOUNTER — TELEPHONE (OUTPATIENT)
Dept: UROLOGY | Facility: CLINIC | Age: 83
End: 2023-10-12

## 2023-10-12 NOTE — TELEPHONE ENCOUNTER
----- Message from Tom Heath MD sent at 10/11/2023  5:08 PM EDT -----  Please let him know that his PSA is normal, but it jr a little bit-I wish to check it again in 1 year.

## 2023-10-12 NOTE — TELEPHONE ENCOUNTER
No communication consent. Called patient and left a generic vm for pt to call the office back at [phone].

## 2023-11-07 ENCOUNTER — PROCEDURE VISIT (OUTPATIENT)
Dept: UROLOGY | Facility: CLINIC | Age: 83
End: 2023-11-07
Payer: COMMERCIAL

## 2023-11-07 VITALS
HEIGHT: 64 IN | BODY MASS INDEX: 27.86 KG/M2 | DIASTOLIC BLOOD PRESSURE: 60 MMHG | HEART RATE: 80 BPM | SYSTOLIC BLOOD PRESSURE: 116 MMHG | WEIGHT: 163.2 LBS

## 2023-11-07 DIAGNOSIS — R33.9 INCOMPLETE BLADDER EMPTYING: Primary | ICD-10-CM

## 2023-11-07 DIAGNOSIS — N32.89 BLADDER SPASM: ICD-10-CM

## 2023-11-07 DIAGNOSIS — R33.9 URINARY RETENTION: ICD-10-CM

## 2023-11-07 DIAGNOSIS — N40.1 BPH WITH OBSTRUCTION/LOWER URINARY TRACT SYMPTOMS: ICD-10-CM

## 2023-11-07 DIAGNOSIS — N13.8 BPH WITH OBSTRUCTION/LOWER URINARY TRACT SYMPTOMS: ICD-10-CM

## 2023-11-07 PROCEDURE — 51705 CHANGE OF BLADDER TUBE: CPT

## 2023-11-07 NOTE — PROGRESS NOTES
11/7/2023    Lino Mehta  1940  [id number]    Diagnosis: Incomplete bladder emptying, Urinary retention, Bladder spasms  Chief Complaint    SPT change         Patient presents for routine SPT exchange managed by Dr. Anoop Jain    Patient reports he is still having moderate to large volumes that he is draining from his SPT post urination via urethra     Follow up in 3 weeks for next SPT change. Procedure: Suprapubic Tube Change       Cystostomy tube change     Date/Time  11/7/2023 10:30 AM     Performed by  Gabby Palmer RN   Authorized by  Ainsley Gunn MD     Universal Protocol   Consent: Verbal consent obtained. Risks and benefits: risks, benefits and alternatives were discussed  Consent given by: patient  Patient understanding: patient states understanding of the procedure being performed  Patient identity confirmed: verbally with patient      Local anesthesia used: no     Anesthesia   Local anesthesia used: no     Sedation   Patient sedated: no        Specimen: no    Culture: no   Procedure Details   Patient tolerance: patient tolerated the procedure well with no immediate complications           Current catheter removed without difficulty after deflation of an intact balloon. Site prepped with Betadine, new 18F  suprapubic spt change via aseptic technique without incident, 10 ml balloon inflated with sterile water. Irrigated easily for straw-yellow return, mild spasm noted. Patient tolerated well. Catheter plug attached.      Vitals:    11/07/23 1046   BP: 116/60   Pulse: 80   Weight: 74 kg (163 lb 3.2 oz)   Height: 5' 4" (1.626 m)         Gabby Palmer RN

## 2023-11-07 NOTE — PROGRESS NOTES
I supervised the Advanced Practitioner. I reviewed the Advanced Practitioner note and agree.     Pita Dover MD 11/07/23

## 2023-11-10 ENCOUNTER — TELEPHONE (OUTPATIENT)
Dept: NEUROLOGY | Facility: CLINIC | Age: 83
End: 2023-11-10

## 2023-11-10 NOTE — TELEPHONE ENCOUNTER
I called and left a voicemail message reminding the patient of there upcoming appointment with Willy Gallo PA-C on 11/15/23 @ 9 am in the [facility]. I requested a call back to our office to confirm the appointment or if the patient has any issues or concerns or cannot keep this appointment.

## 2023-11-15 ENCOUNTER — TELEPHONE (OUTPATIENT)
Dept: NEUROLOGY | Facility: CLINIC | Age: 83
End: 2023-11-15

## 2023-11-15 ENCOUNTER — OFFICE VISIT (OUTPATIENT)
Dept: NEUROLOGY | Facility: CLINIC | Age: 83
End: 2023-11-15
Payer: MEDICARE

## 2023-11-15 VITALS
RESPIRATION RATE: 16 BRPM | SYSTOLIC BLOOD PRESSURE: 126 MMHG | HEART RATE: 65 BPM | TEMPERATURE: 97.6 F | HEIGHT: 64 IN | OXYGEN SATURATION: 96 % | BODY MASS INDEX: 27.31 KG/M2 | DIASTOLIC BLOOD PRESSURE: 74 MMHG | WEIGHT: 160 LBS

## 2023-11-15 DIAGNOSIS — F01.A0 MILD VASCULAR DEMENTIA WITHOUT BEHAVIORAL DISTURBANCE, PSYCHOTIC DISTURBANCE, MOOD DISTURBANCE, OR ANXIETY (HCC): ICD-10-CM

## 2023-11-15 DIAGNOSIS — Z86.73 HISTORY OF CVA (CEREBROVASCULAR ACCIDENT): Primary | ICD-10-CM

## 2023-11-15 PROCEDURE — 99214 OFFICE O/P EST MOD 30 MIN: CPT | Performed by: PHYSICIAN ASSISTANT

## 2023-11-15 NOTE — TELEPHONE ENCOUNTER
MSW was contacted by Lisset Moore PA-C, this date. She advised that she saw patient this date (patient's son also accompanied patient to the appointment). Lisset stated that patient has not been taking his medications, does not brush his teeth, and intermittently showers. Lisset stated that patient threw away a Zio patch prescribed by his cardiologist. Lisset stated that patient does not consistently attend scheduled medical appointments (no-showed for 10/18/23 PCP appt).    MERCEDES and MSW discussed best plan of care. Discussion about referral to neuropsych, but concern that patient may not follow through with attending appt, given history of noncompliance with appts. Will refer to Protective Services due to concern about self neglect.    MSW phoned [facility] on [facility] Protective Services at [phone] (spoke with Patricia) to relay above concerns. Total time on call to make Protective Services report was 53 minutes.

## 2023-11-15 NOTE — PROGRESS NOTES
Patient ID: Aliyah Vines is a 81 y.o. male. Assessment:  Patient initially seen following hospitalization in May when he presented with a syncopal episode. There was no seizure-like activity or post-ictal state. A stroke alert was called due to concern for left-sided weakness when the event occurred. CTA with no LVO. He was noted to have old strokes in the bilateral cerebellum, as well as left occipital area, which appeared embolic. There was NO acute stroke seen. Despite prior stroke, did not seem to be on ASA at baseline. He was initiated on ASA, statin, and recommended to see cardiology for long term rhythm monitoring. He has not had long-term rhythm monitoring to date, threw out the Zio patch apparently. Did not make appt with cardiology when referred. There have not been any symptoms to indicate recurrent TIA/CVA. He was started on Aricept by PCP for vascular dementia. Today patient and son reported that he is not taking any of his medications, does not brush his teeth, showers sporadically (son states once a week). Our office had previously tried assisting son with resources for caregiving, as son does live with patient, but is working 12 hour days and not home much. Son states  sent paperwork, but patient won't fill it out. When asking patient why he is not brushing teeth or taking medications he states "I'm lazy". I asked if he knew why he is being prescribed the medications/reason for taking them and he said he understands. I asked if he knew what could happen if he does not take the meds, he says he understands there could be worsening of his medical conditions if not being treated appropriately. Son says PCP aware of not taking meds. Patient recently no-show to PCP appt due to not wanting to get ready and go out that day. I had also referred to [facility] (geriatrics) for assessment, but no appt made.     Extensively discussed my concerns with patient regarding his choice to not take any medications or keep up with personal hygiene. I do not perform competency evals therefore it is unknown if he is fully competent to make these choices. Consider neuropsych referral, although he would not likely follow through, as he continues to choose not to follow through with medications and appts (although interesting that he came to this appt today). His son has been helpful in attempting to assist with med management (putting meds in a pill box for the week, reminding him to take meds and do personal care), but patient just does not do it. We also discussed possibility of home care aids or moving to a care facility, which his son would like, but patient does not want that. Discussed with patient that he may want to consider this so he can be well taken care of. Discussed it is better that he have these conversations with his son now while he can still assist in decision making process rather than it get to the point where he no longer can make any decisions and one is made for him. He tells me he will consider this. **after the visit today I spoke with my  regarding the visit and my concerns. She will call in a report of self-neglect to [facility] on [address]:  -for ongoing stroke prevention, patient is advised to take ASA 81mg daily and atorvastatin 40mg daily  -will defer management of BP, lipids and blood sugar to PCP  -would still suggest long-term cardiac rhythm monitoring to ensure no underlying Afib as a cause of prior strokes. If Afib is found, would require full anticoagulation.   Patient already referred to cardiology  -heart healthy diet and routine exercise discussed  -still suggest geriatrics evaluation-patient already referred, can reach out to their office to schedule  -advised compliance with all Rx meds  -advised keeping up with personal hygiene   -advised staying active mentally, physically and socially as much as possible  -at this time, patient will be seen back by neurology on a PRN basis. Should see PCP routinely for close follow up of chronic conditions    Signs and symptoms of stroke discussed. Diagnoses and all orders for this visit:    History of CVA (cerebrovascular accident)    Mild vascular dementia without behavioral disturbance, psychotic disturbance, mood disturbance, or anxiety ([address])           Subjective:    HPI    Patient is a 81-year-old male with PMH of prior stroke, memory problems/cognitive impairment, hypertension, CKD, and BPH with urinary retention who presents today for follow up. Interval History 11/15/2023:  Patient presents today with his son, Chato Brooks. Patient offers no complaints, says he is doing fine. His son reports that patient has been refusing to take all of his medications (this was mentioned last visit too--either not taking, or taking randomly). Patient admits he is not taking medications. When asked why he is not taking them, he says “I'm lazy”. I asked if he knew what he was taking the medications for and the importance of them, and he said “I guess”. He is also only showering once a week, wearing dirty clothes, and does not do personal hygiene such as brushing his teeth. Patient admits he does not brush his teeth. When asked why not, he again replied “I'm lazy”. He does live with his son Chato Brooks, however Chato Brooks is out of the house for about 14 hours on work days, so patient is home alone (there is a roommate but roommate not involved with his care). There have not been any safety concerns such as wandering, falls, leaving oven/stove on etc.  His son says he was laying out his pills in a pill box every day, but he wasn't taking them, therefore he stopped. Our  did contact the son after last visit to give info on resources for caregiving or other help in the home, but patient won't fill out the paperwork.   He does not do much at all during the day, just sits at home. Does not leave the home often at all. He would not get showered and ready for his PCP appt a few weeks ago and missed it. I also referred to cardiology at timing of last visit and apparently he threw the [facility] away. He also was referred to geriatrics, who reached out to schedule, but no appt made. History from [address] [place] [address] Dr visit 7/13/2023:   Patient presented to the ED on 5/22/23 after a syncopal episode. Per chart review, the history was a bit unclear. Patient reportedly had a syncopal episode and was thought to have left-sided weakness following the incident. In the ED, there may have been some degree of dysarthria, facial asymmetry and aphasia as well. Symptoms were rapidly resolving on reevaluation. NIHSS 5 initially, but on re-eval 3. /79. CTH with no hemorrhage, likely subacute infarct in the left temporal occipital region as well as left postcentral gyrus. 6 mm soft tissue nodular projection into the left frontal horn of the lateral ventricle. CTA with no evidence of large vessel occlusion. IV thrombolysis not given due to resolving symptoms, low NIHSS. He was admitted on stroke pathway. Patient reported that he came into his home from being outside doing yard work and was walking into the kitchen, felt really lightheaded and was going to get a drink from the fridge where he apparently lost consciousness. He said he fell against a wall and his son came over and helped him sit down. He reported at some point he noticed left sided weakness, not sure exactly when. He said people mentioned he was slurring but didn't notice this himself. He didn't recall palpitations. A1C 5.4, lipid panel , LDL 61. MRI brain w wo contrast with no acute stroke. There were chronic 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 and bilateral cerebellar hemispheres. Chronic frontotemporal predominant volume loss and ventriculomegaly.  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 These have been stable since the MRI and CT scans from 2012. Per inpatient neurology, concern for embolic etiology of occipital stroke (cortical infarct), and no correlate on CTA for that (less likely atheroembolic). Outpatient cardiac monitoring was advised. He was initiated on ASA 81mg daily and statin (was not on antiplatelet at home despite prior stroke). He just established with a PCP (outside of the [place]) following discharge. ECHO and Holter monitor were ordered, but not yet completed. The PCP started him on Aricept 5mg daily for likely dementia. Today, patient presents with his son, Tim Pérez. They are both difficult historians. Patient relates history of prior stroke, but unsure when this occurred (they guess about 5 years ago). Patient was living in [place] at the time with another son. He more recently moved back to [place] with his son Tim Pérez because his son in [place] "couldn't handle him anymore". They both note cognitive/memory issues but cannot give me a timeline on when this started (within the last few years). His son works long hours and is not always home, but says a friend/roommate lives in the home as well and is there most of the time. Patient is not good with taking his meds. His son fills the pill box weekly and sometimes meds are gone from the wrong day, sometimes it appears he did not take his meds. He is able to do ADLs on his own for the most part. He has [facility] coming into the home currently, he says he is getting therapies. Patient has not experienced any new neurologic symptoms since hospitalization.        The following portions of the patient's history were reviewed and updated as appropriate: current medications, past family history, past medical history, past social history, past surgical history, and problem list.         Objective:    Blood pressure 126/74, pulse 65, temperature 97.6 °F (36.4 °C), temperature source Temporal, resp. rate 16, height 5' 4" (1.626 m), weight 72.6 kg (160 lb), SpO2 96 %. Physical Exam  Constitutional:       Comments: Poor hygiene noted    Eyes:      Extraocular Movements: EOM normal.      Pupils: Pupils are equal, round, and reactive to light. Neurological:      Mental Status: He is alert. Motor: Motor strength is normal.  Psychiatric:         Speech: Speech normal.      Comments: Quiet, flat affect         Neurological Exam  Mental Status  Alert. Orientation: Oriented to month, not date. Correct year. Knew current [address] [facility] . Speech is normal. Language is fluent with no aphasia. Attention and concentration are normal.    Cranial Nerves  CN II: Visual fields full to confrontation. CN III, IV, VI: Extraocular movements intact bilaterally. Pupils equal round and reactive to light bilaterally. CN V: Facial sensation is normal.  CN VII: Full and symmetric facial movement. CN VIII: Hearing is normal.  CN IX, X: Palate elevates symmetrically  CN XI: Shoulder shrug strength is normal.  CN XII: Tongue midline without atrophy or fasciculations. Motor   Normal muscle tone. Strength is 5/5 throughout all four extremities. Sensory  Light touch is normal in upper and lower extremities. Reflexes  Diffusely hporeflexic . Coordination  Right: Finger-to-nose normal.Left: Finger-to-nose normal.    Gait    Slow gait, no assistive device . ROS:    Review of Systems   Constitutional:  Negative for chills and fever. HENT:  Negative for ear pain and sore throat. Eyes:  Negative for pain and visual disturbance. Respiratory:  Negative for cough and shortness of breath. Cardiovascular:  Negative for chest pain and palpitations. Gastrointestinal:  Negative for abdominal pain and vomiting. Genitourinary:  Positive for frequency and urgency. Negative for dysuria and hematuria. Musculoskeletal:  Positive for back pain and gait problem.  Negative for arthralgias. Skin:  Negative for color change and rash. Neurological:  Positive for dizziness (sometimes), light-headedness and headaches. Negative for seizures and syncope. Hematological:  Bruises/bleeds easily. Psychiatric/Behavioral:  Positive for confusion and sleep disturbance. Depression, anxiety, and mood swings   All other systems reviewed and are negative.     I personally reviewed and updated the ROS as appropriate

## 2023-11-15 NOTE — PATIENT INSTRUCTIONS
Would suggest taking all medications as prescribed  Make another appt with your PCP for routine health maintenance  Would consider supervision at home, or considering a nursing facility to assist with care

## 2023-11-22 NOTE — TELEPHONE ENCOUNTER
MSW attempted to reach patient's son, Min, at [phone]. No answer. MSW left a message requesting callback. Awaiting same.     Min called back a few minutes later ([phone]). Min stated that they did receive a visit from an [facility] representative who will be making arrangements to get patient an aide for a few hours per week. Min stated that he was told to follow through with the [facility], as the [facility] may provide additional coverage for aides. Min stated that he has been in contact with the [facility] and that he needs to gather patient's financial documents to see if he qualifies.     MSW will follow-up in about 1 month to check on progress with aides.

## 2023-11-30 ENCOUNTER — TELEPHONE (OUTPATIENT)
Dept: INTERNAL MEDICINE CLINIC | Facility: CLINIC | Age: 83
End: 2023-11-30

## 2023-11-30 ENCOUNTER — PROCEDURE VISIT (OUTPATIENT)
Dept: UROLOGY | Facility: CLINIC | Age: 83
End: 2023-11-30
Payer: MEDICARE

## 2023-11-30 VITALS — HEIGHT: 64 IN | BODY MASS INDEX: 27.31 KG/M2 | WEIGHT: 160 LBS

## 2023-11-30 DIAGNOSIS — R33.9 URINARY RETENTION: ICD-10-CM

## 2023-11-30 DIAGNOSIS — N40.1 BPH WITH OBSTRUCTION/LOWER URINARY TRACT SYMPTOMS: ICD-10-CM

## 2023-11-30 DIAGNOSIS — N32.89 BLADDER SPASM: ICD-10-CM

## 2023-11-30 DIAGNOSIS — R33.9 INCOMPLETE BLADDER EMPTYING: Primary | ICD-10-CM

## 2023-11-30 DIAGNOSIS — N13.8 BPH WITH OBSTRUCTION/LOWER URINARY TRACT SYMPTOMS: ICD-10-CM

## 2023-11-30 PROCEDURE — 51705 CHANGE OF BLADDER TUBE: CPT

## 2023-11-30 NOTE — PROGRESS NOTES
I supervised the Advanced Practitioner. I reviewed the Advanced Practitioner note and agree.     Ayaka Borrego MD 11/30/23

## 2023-11-30 NOTE — PROGRESS NOTES
11/30/2023    Zhane Cancino  1940  [id number]    Diagnosis: Incomplete bladder emptying, BPH with LUTS, Urinary retention, Bladder spasm   Chief Complaint    SPT change          Patient presents for routine SPT exchange managed by Dr. Darylene Penman. Patient not currently taking any of his medications because he doesn't want to. Re-educated patient with son present on emptying his bladder normally, then measuring residuals from SPT and keeping track to determine if SPT can be removed. Reports patient will post likely forget to do it or just chose not to do it. Plan  Follow up in 3 weeks for next SPT change     Procedure: Suprapubic Tube Change       Cystostomy tube change     Date/Time  11/30/2023 10:30 AM     Performed by  Duncan Kelsey RN   Authorized by  Pita Dover MD     Universal Protocol   Consent: Verbal consent obtained. Risks and benefits: risks, benefits and alternatives were discussed  Consent given by: patient  Patient understanding: patient states understanding of the procedure being performed  Patient identity confirmed: verbally with patient      Local anesthesia used: no     Anesthesia   Local anesthesia used: no     Sedation   Patient sedated: no        Specimen: no    Culture: no   Procedure Details   Patient tolerance: patient tolerated the procedure well with no immediate complications             Current catheter removed without difficulty after deflation of an intact balloon. Site prepped with Betadine, new 18F  suprapubic spt change via aseptic technique without incident, 10 ml balloon inflated with sterile water. Irrigated easily for clear return, mild spasm noted. Patient tolerated well. Catheter plug attached.      Vitals:    11/30/23 1011   Weight: 72.6 kg (160 lb)   Height: 5' 4" (1.626 m)         Duncan Kelsey RN

## 2023-12-22 ENCOUNTER — PROCEDURE VISIT (OUTPATIENT)
Dept: UROLOGY | Facility: CLINIC | Age: 83
End: 2023-12-22
Payer: COMMERCIAL

## 2023-12-22 VITALS
WEIGHT: 159.8 LBS | SYSTOLIC BLOOD PRESSURE: 118 MMHG | DIASTOLIC BLOOD PRESSURE: 60 MMHG | BODY MASS INDEX: 27.28 KG/M2 | HEART RATE: 70 BPM | HEIGHT: 64 IN

## 2023-12-22 DIAGNOSIS — N13.8 BPH WITH OBSTRUCTION/LOWER URINARY TRACT SYMPTOMS: ICD-10-CM

## 2023-12-22 DIAGNOSIS — R33.9 INCOMPLETE BLADDER EMPTYING: Primary | ICD-10-CM

## 2023-12-22 DIAGNOSIS — N40.1 BPH WITH OBSTRUCTION/LOWER URINARY TRACT SYMPTOMS: ICD-10-CM

## 2023-12-22 DIAGNOSIS — R33.9 URINARY RETENTION: ICD-10-CM

## 2023-12-22 PROCEDURE — 51705 CHANGE OF BLADDER TUBE: CPT

## 2023-12-22 NOTE — PROGRESS NOTES
"12/22/2023    Aleks Dacosta  1940  [id number]    Diagnosis: Incomplete bladder emptying, BPH with obstruction, Urinary retention   Chief Complaint    SPT change         Patient presents for routine SPT exchange managed by Dr. Kathleen    Plan  Follow up in 4 weeks for next SPT change     Procedure: Suprapubic Tube Change       Cystostomy tube change     Date/Time  12/22/2023 1:30 PM     Performed by  Yanique Johnson RN   Authorized by  DOLORES Styles     Universal Protocol   Consent: Verbal consent obtained.  Risks and benefits: risks, benefits and alternatives were discussed  Consent given by: patient  Patient understanding: patient states understanding of the procedure being performed  Patient identity confirmed: verbally with patient      Local anesthesia used: no     Anesthesia   Local anesthesia used: no     Sedation   Patient sedated: no        Specimen: no    Culture: no   Procedure Details   Patient tolerance: patient tolerated the procedure well with no immediate complications               Current catheter removed without difficulty after deflation of an intact balloon. Site prepped with Betadine, new 18F  suprapubic spt change via aseptic technique without incident, 10 ml balloon inflated with sterile water. Irrigated easily for straw-yellow return, mild spasm noted. Patient tolerated well. Catheter plug attached.     Vitals:    12/22/23 1333   BP: 118/60   Pulse: 70   Weight: 72.5 kg (159 lb 12.8 oz)   Height: 5' 4\" (1.626 m)         Yanique Johnson RN   "

## 2023-12-22 NOTE — TELEPHONE ENCOUNTER
MSW attempted to reach patient's son, Min, at [phone] to follow-up regarding getting aides in the home. No answer. MSW left a message requesting callback. Awaiting same.

## 2023-12-26 NOTE — TELEPHONE ENCOUNTER
MSW received a call back from Kirill at [facility] ([phone]). He advised that he just completed the assessment for the [facility] on 12/22 as it took time for patient/son to gather needed financial documents. Kirill stated that he now has everything he needs to move forward (assessment complete and income information), but that it can take time to get services in place. Kirill suggested this writer to follow-up again in early February to see if aides are in place yet. Kirill stated that [facility] can only provide about 8 hours of care per week, but that he will continue to encourage patient's son to consider referral to Waiver/explore coverage through the [facility] as they may be able to offer more services than [facility].

## 2023-12-26 NOTE — TELEPHONE ENCOUNTER
MSW attempted to reach patient's son, Min, at [phone] to follow-up regarding getting aides in the home. No answer. MSW left a message requesting callback. Awaiting same.     MSW phoned the [facility] at [phone]. MSW requested to speak with the assigned , but Kirill was not available. MSW left a message for Kirill to return this writer's call. Awaiting same.

## 2024-01-19 ENCOUNTER — TELEPHONE (OUTPATIENT)
Age: 84
End: 2024-01-19

## 2024-01-19 NOTE — TELEPHONE ENCOUNTER
Returned call to patients noah Copeland and re-scheduled nurse visit for SPT change to 1/22/24 @ 10:30 am.

## 2024-01-22 ENCOUNTER — PROCEDURE VISIT (OUTPATIENT)
Dept: UROLOGY | Facility: CLINIC | Age: 84
End: 2024-01-22
Payer: MEDICARE

## 2024-01-22 VITALS
HEIGHT: 64 IN | SYSTOLIC BLOOD PRESSURE: 114 MMHG | WEIGHT: 158.6 LBS | DIASTOLIC BLOOD PRESSURE: 68 MMHG | BODY MASS INDEX: 27.08 KG/M2 | HEART RATE: 70 BPM

## 2024-01-22 DIAGNOSIS — N13.8 BPH WITH OBSTRUCTION/LOWER URINARY TRACT SYMPTOMS: ICD-10-CM

## 2024-01-22 DIAGNOSIS — N40.1 BPH WITH OBSTRUCTION/LOWER URINARY TRACT SYMPTOMS: ICD-10-CM

## 2024-01-22 DIAGNOSIS — R33.9 URINARY RETENTION: ICD-10-CM

## 2024-01-22 DIAGNOSIS — R33.9 INCOMPLETE BLADDER EMPTYING: Primary | ICD-10-CM

## 2024-01-22 PROCEDURE — 51705 CHANGE OF BLADDER TUBE: CPT

## 2024-01-22 NOTE — PROGRESS NOTES
"1/22/2024    Aleks Dacosta  1940  [id number]    Diagnosis: Incomplete bladder emptying, BPH with obstruction, Urinary retention   Chief Complaint    SPT change         Patient presents for routine SPT exchange managed by Dr. Kathleen. Patient is not taking any of his medications as prescribed. Reports he empties his SPT \"a few times a day\" and sometimes gets a little out and sometimes a lot. He does not remember to track residuals via SPT after urinating via urethra.     Plan  Follow up in 3 weeks for next SPT change     Procedure: Suprapubic Tube Change       Cystostomy tube change     Date/Time  1/22/2024 10:30 AM     Performed by  Yanique Johnson RN   Authorized by  Jose Alberto Kathleen MD     Universal Protocol   Consent: Verbal consent obtained.  Risks and benefits: risks, benefits and alternatives were discussed  Consent given by: patient  Patient understanding: patient states understanding of the procedure being performed  Patient identity confirmed: verbally with patient      Local anesthesia used: no     Anesthesia   Local anesthesia used: no     Sedation   Patient sedated: no        Specimen: no    Culture: no   Procedure Details   Patient tolerance: patient tolerated the procedure well with no immediate complications               Current catheter removed without difficulty after deflation of an intact balloon. Site prepped with Betadine, new 18F  suprapubic spt change via aseptic technique without incident, 10 ml balloon inflated with sterile water. irrigated easily for straw-yellow return, mild spasm noted. Patient tolerated well. Catheter plug attached.     Vitals:    01/22/24 1019   BP: 114/68   Pulse: 70   Weight: 71.9 kg (158 lb 9.6 oz)   Height: 5' 4\" (1.626 m)         Yanique Johnson RN   "

## 2024-01-22 NOTE — PROGRESS NOTES
I supervised the Advanced Practitioner.  I reviewed the Advanced Practitioner note and agree.    Jose Alberto Kathleen MD 01/22/24

## 2024-02-06 NOTE — TELEPHONE ENCOUNTER
"MSW phoned patient's , Kirill, at the [facility] this date ([phone]). Kirill stated that he was able to get 8 hours of aide care in place for patient starting on 1/26. Kirill stated that gets 2 hours of care 4 days per week (Mon, Wed, Thurs, Fri).    Kirill stated that he is still encouraging patient/son to explore applying for services through the [place] [facility] for waiver services or the [facility] as those programs may be able to offer more services.     MSW phoned patient's son, Min, at [phone] and was able to reach him. Min stated that the aide care is going well, and that patient has been accepting of same for the most part (though he did try to turn away an aide one day). MSW encouraged Min to apply for services from the [facility]/[place] [facility] as they may be able to offer more services that what Aging can provide. Min stated that the VA has \"not been good\" to patient so he is not sure if that will go anywhere, but will consider applying for the [place] [facility]. MSW explained that patient would need to qualify based on his care needs and financially.     MSW will follow-up with patient's son in about 1 month to see if they have applied for Waiver services. MSW directed patient's son to reach out if they need any assistance in the interim.   "

## 2024-02-23 ENCOUNTER — TELEPHONE (OUTPATIENT)
Dept: UROLOGY | Facility: CLINIC | Age: 84
End: 2024-02-23

## 2024-02-23 ENCOUNTER — PROCEDURE VISIT (OUTPATIENT)
Dept: UROLOGY | Facility: CLINIC | Age: 84
End: 2024-02-23
Payer: COMMERCIAL

## 2024-02-23 VITALS
SYSTOLIC BLOOD PRESSURE: 124 MMHG | HEIGHT: 64 IN | WEIGHT: 164.6 LBS | HEART RATE: 66 BPM | DIASTOLIC BLOOD PRESSURE: 70 MMHG | BODY MASS INDEX: 28.1 KG/M2

## 2024-02-23 DIAGNOSIS — N32.89 BLADDER SPASMS: ICD-10-CM

## 2024-02-23 DIAGNOSIS — N13.8 BPH WITH OBSTRUCTION/LOWER URINARY TRACT SYMPTOMS: ICD-10-CM

## 2024-02-23 DIAGNOSIS — N40.1 BENIGN PROSTATIC HYPERPLASIA WITH INCOMPLETE BLADDER EMPTYING: Primary | ICD-10-CM

## 2024-02-23 DIAGNOSIS — R33.9 URINARY RETENTION: ICD-10-CM

## 2024-02-23 DIAGNOSIS — R33.9 INCOMPLETE BLADDER EMPTYING: Primary | ICD-10-CM

## 2024-02-23 DIAGNOSIS — N40.1 BPH WITH OBSTRUCTION/LOWER URINARY TRACT SYMPTOMS: ICD-10-CM

## 2024-02-23 DIAGNOSIS — R39.14 BENIGN PROSTATIC HYPERPLASIA WITH INCOMPLETE BLADDER EMPTYING: Primary | ICD-10-CM

## 2024-02-23 PROCEDURE — 51705 CHANGE OF BLADDER TUBE: CPT

## 2024-02-23 RX ORDER — OXYBUTYNIN CHLORIDE 5 MG/1
5 TABLET ORAL 3 TIMES DAILY PRN
Qty: 90 TABLET | Refills: 3 | Status: SHIPPED | OUTPATIENT
Start: 2024-02-23

## 2024-02-23 RX ORDER — TAMSULOSIN HYDROCHLORIDE 0.4 MG/1
0.8 CAPSULE ORAL
Qty: 180 CAPSULE | Refills: 3 | Status: SHIPPED | OUTPATIENT
Start: 2024-02-23

## 2024-02-23 NOTE — PROGRESS NOTES
"2/23/2024    Aleks NAVARRO Praneeth  1940  [id number]    Diagnosis: Incomplete bladder emptying, Urinary retention   Chief Complaint    SPT change         Patient presents for routine SPT exchange managed by Dr. Kathleen    Plan  Follow up in 3 weeks for next SPT change     Procedure: Suprapubic Tube Change       Cystostomy tube change     Date/Time  2/23/2024 9:00 AM     Performed by  Yanique Johnson RN   Authorized by  DOLORES Styles     Universal Protocol   Consent: Verbal consent obtained.  Risks and benefits: risks, benefits and alternatives were discussed  Consent given by: patient  Patient understanding: patient states understanding of the procedure being performed  Patient identity confirmed: verbally with patient      Local anesthesia used: no     Anesthesia   Local anesthesia used: no     Sedation   Patient sedated: no        Specimen: no    Culture: no   Procedure Details   Patient tolerance: patient tolerated the procedure well with no immediate complications               Current catheter removed without difficulty after deflation of an intact balloon. Site prepped with Betadine, new 18F  suprapubic spt change via aseptic technique without incident, 10 ml balloon inflated with sterile water. Irrigated easily for straw-yellow return, mild spasm noted. Patient tolerated well. Catheter plug attach.     Vitals:    02/23/24 0905   BP: 124/70   Pulse: 66   Weight: 74.7 kg (164 lb 9.6 oz)   Height: 5' 4\" (1.626 m)         Yanique Johnson RN   "

## 2024-03-06 NOTE — TELEPHONE ENCOUNTER
MSW attempted to reach patient's son, Min, to see how things are going with the aides from the [facility] and to see if they have applied for additional services with the [facility] [facility]. No answer at [phone]. MSW left a message requesting callback. Awaiting same.

## 2024-03-11 NOTE — TELEPHONE ENCOUNTER
MSW attempted to reach patient's son, Min, to see how things are going with the aides from the [facility] and to see if they have applied for additional services with the [facility] [facility]. No answer at [phone]. MSW left a message requesting callback. Awaiting same.

## 2024-03-13 NOTE — TELEPHONE ENCOUNTER
"MSW attempted to reach patient's son, Min, to see how things are going with the aides from the [facility] and to see if they have applied for additional services with the [facility] [facility]. No answer at [phone]. MSW left a message requesting callback. Awaiting same.     Since there have been 3 unsuccessful attempts to reach patient, MSW sent an \"Unable to Reach\" letter via [employer] and postal mail this date.     MSW will be available should patient/son call back.   " Subjective   Patient ID: Pam is a 9 year old female who is accompanied by mom.  : 2013   MRN: [id number]     CHIEF COMPLAINT:   Chief Complaint   Patient presents with   • ADHD     concerns       HPI:   Patient is here today for evaluation for possible ADHD.  Patient has been having signs and symptoms of ADHD - hyperactivity, impulsivity, trouble focusing, distractibility, daydreaming, not completing work, taking too long to do homework, not listening when spoken to directly, not following directions, failing to complete chores and schoolwork, forgetfulness, difficulty in organizing tasks or activities, frequent complaints of being bored easily - since about 2nd grade.  Comments from teachers have been consistent with ADHD symptoms since that time.  There is some behavioral issues.  There is no evident concerns for symptoms of oppositional defiant behavior, conduct disorder, anxiety or depression.  There is no evidence of a learning issue as the root of the problem (dyslexia or learning disability).  Mom states that child is seeing a psychologist once a week for counseling.  She even signed her up for swimming lessons to keep her busy and [employer] for extra tutoring.  Patient also has IEP for math, reading, and speech at school.  Her symptoms are now more evident since starting third grade.  She is very fidgety and cannot sit still.  She does repetitive, ritualistic movements like constant washing of hands.  She gives tantrums the level of which are out of proportion to her age specially when told \"no\".  She is requiring more time to finish her homework.    There is no history of excessive daydreaming.  No seizure-like activities.  No symptoms of obstructive sleep apnea or disordered sleep pattern.      Randolph Initial Assessment Scale (Mother Informant): See media  Total number of questions scored 2 or 3 in questions 1-9: 8/9 (significant for predominantly inattentive subtype)   Total number of  questions scored 2 or 3 in questions 10-18: 5/9 (significant for predominantly hyperactive/impulsive subtype)   Total Symptom Score for questions 1-18: 39 (significant for ADHD predominantly inattentive type)   Total number of questions scored 2 or 3 in questions 19-26: 3 (not significant for oppositional defiant disorder)  Total number of questions scored 2 or 3 in questions 27-40: 0 (not significant for conduct disorder)  Total number of questions scored 2 or 3 in questions 41-47: 0 (not significant for anxiety/depression disorder)  Total number of questions scored 4 or 5 in questions 48-55: 5 (significant impairment)  Average Performance Score: 3.875 (somewhat of a problem range)     Past Medical History:   Diagnosis Date   • Chin laceration 09/09/2021    sutures x 3 ([facility] )   • COVID-19 virus infection 05/03/2022    supportive care   • Croup 05/15/2022    Decadron IM   • Influenza A 03/25/2016    Tamiflu ([facility])   • Recurrent otitis media 10/19/2015    12/13/2016, 1/26/2017, 3/20/2017, 4/15/2021, 5/27/2021, 3/21/2022 Amoxicillin, Cefdinir   • Strep tonsillitis 05/13/2019 2/13/2020 Amoxicillin       There is no problem list on file for this patient.      No outpatient medications have been marked as taking for the 9/2/22 encounter (Office Visit) with Cony Lombardi MD.       ALLERGIES:  No Known Allergies    REVIEW OF SYSTEMS:   The rest of the cardiovascular, respiratory, GI, neuro, urinary and musculoskeletal and all other systems are reviewed and are negative.     Objective        Visit Vitals  BP 92/68   Pulse 98   Temp 98.6 °F (37 °C) (Temporal)   Resp 22   Ht 3' 9.83\" (1.164 m)   Wt 22.8 kg (50 lb 4 oz)   BMI 16.82 kg/m²       PHYSICAL EXAMINATION:  Vitals reviewed.  Constitutional: appears well-developed and well-nourished, awake   Head: normocephalic   ENT: both tympanic membranes pearly white with good mobility, nose normal, oropharynx clear, tonsils normal  Mouth: mucous membranes  are moist, no ulcers  Eyes: conjunctivae clear, EOM intact, pupils are equal, round, and reactive to light   Neck: normal range of motion, supple, no lymphadenopathy  Cardiovascular: regular rate and rhythm, normal S1 S2, no murmur heard  Pulmonary/Chest: effort normal, no retractions, breath sounds normal and equal bilaterally, no wheezes or crackles  Abdomen: soft, normal bowel sounds, no distension, no tenderness, no mass, no hepatosplenomegaly, no hernia   Neurological: normal reflexes, normal muscle tone, no tremors or facial tics   Skin: no rash, no cyanosis, no pallor        ASSESSMENT:  1. ADHD (attention deficit hyperactivity disorder), inattentive type         PLAN:   Diagnoses and all orders for this visit:  ADHD (attention deficit hyperactivity disorder), inattentive type  -     SERVICE TO NEUROPSYCHOLOGY    Reviewed Bartelso scores with parent which showed significant scores for ADHD, predominantly inattentive type.    Referred to [facility] for neuropsychological evaluation.  Patient will come back to the office to discuss evaluation report.    Discussed in length the different options for treatment including ADHD education, support, parent training, medications, counseling, and behavioral strategies and interventions.  Pursue 504 plan/IEP through school.  Consider testing for concurrent learning disability/IQ-academic testing.    Hold-off on stimulant medication until neuropsychological evaluation are resulted.    Patient will need baseline labs to rule out other metabolic disorders that could lead to poor attention in school.    Follow up in the office in 1 month.    Continue cognitive behavioral therapy to discuss behavioral issues.    If symptoms get worse, concerns, questions or problems, to call the office or RTC.    Total time spent with direct patient care, discussion of treatment and plan, and documenting notes in chart: 40 minutes.     Return for f/u with  neuropsychologist.    Cony Lombardi MD

## 2024-03-15 NOTE — TELEPHONE ENCOUNTER
LATE ENTRY FROM 3/13/24:    Patient's son, Min, called back ([phone]). Min stated that they are interested in applying for more in home help. MSW inquired if they planned to apply via the [facility] or [place] [facility]. Min indicated that they would like to apply via the [place] [employer]. MSW will make initial referral. MSW did advise that patient will need to take part in 2 home visits - one to assess his level of care/needs and the other to assess his finances/complete an MA application. MSW did advise that they will need to provide proof of his finances once requested from the [facility].

## 2024-03-15 NOTE — TELEPHONE ENCOUNTER
MSW phoned the [place] [employer] this date at [phone] and spoke with Scottie to make referral for the waiver. MSW attempted to conference patient's son, Min, into the call to schedule the first in home visit but there was no answer. MSW left a message asking patient/son to call together to [phone] to schedule the first assessment.     MSW will follow.

## 2024-03-18 ENCOUNTER — PROCEDURE VISIT (OUTPATIENT)
Dept: UROLOGY | Facility: CLINIC | Age: 84
End: 2024-03-18
Payer: MEDICARE

## 2024-03-18 ENCOUNTER — TELEPHONE (OUTPATIENT)
Dept: UROLOGY | Facility: CLINIC | Age: 84
End: 2024-03-18

## 2024-03-18 VITALS
SYSTOLIC BLOOD PRESSURE: 118 MMHG | WEIGHT: 158.6 LBS | HEIGHT: 64 IN | DIASTOLIC BLOOD PRESSURE: 58 MMHG | HEART RATE: 70 BPM | BODY MASS INDEX: 27.08 KG/M2

## 2024-03-18 DIAGNOSIS — N40.1 BPH WITH OBSTRUCTION/LOWER URINARY TRACT SYMPTOMS: Primary | ICD-10-CM

## 2024-03-18 DIAGNOSIS — N32.89 BLADDER SPASMS: ICD-10-CM

## 2024-03-18 DIAGNOSIS — N13.8 BPH WITH OBSTRUCTION/LOWER URINARY TRACT SYMPTOMS: Primary | ICD-10-CM

## 2024-03-18 DIAGNOSIS — R33.9 URINARY RETENTION: ICD-10-CM

## 2024-03-18 DIAGNOSIS — N40.1 BENIGN PROSTATIC HYPERPLASIA WITH INCOMPLETE BLADDER EMPTYING: ICD-10-CM

## 2024-03-18 DIAGNOSIS — R39.14 BENIGN PROSTATIC HYPERPLASIA WITH INCOMPLETE BLADDER EMPTYING: ICD-10-CM

## 2024-03-18 PROCEDURE — 51705 CHANGE OF BLADDER TUBE: CPT

## 2024-03-18 RX ORDER — FINASTERIDE 5 MG/1
5 TABLET, FILM COATED ORAL DAILY
Qty: 90 TABLET | Refills: 2 | Status: SHIPPED | OUTPATIENT
Start: 2024-03-18

## 2024-03-18 NOTE — PROGRESS NOTES
I supervised the Advanced Practitioner.  I reviewed the Advanced Practitioner note and agree.    Jose Alberto Kathleen MD 03/18/24

## 2024-03-18 NOTE — PROGRESS NOTES
"3/18/2024    Aleks Dacosta  1940  [id number]    Diagnosis: Urinary retention, Bladder spasms, BPH with obstruction   Chief Complaint    SPT change         Patient presents for SPT exchange managed by Dr. Kathleen    Plan  Follow up in 3 weeks for next SPT change     Procedure: Suprapubic Tube Change       Cystostomy tube change     Date/Time  3/18/2024 10:30 AM     Performed by  Yanique Johnson RN   Authorized by  Jose Alberto Kathleen MD     Universal Protocol   Consent: Verbal consent obtained.  Risks and benefits: risks, benefits and alternatives were discussed  Consent given by: patient  Patient understanding: patient states understanding of the procedure being performed  Patient identity confirmed: verbally with patient      Local anesthesia used: no     Anesthesia   Local anesthesia used: no     Sedation   Patient sedated: no        Specimen: no    Culture: no   Procedure Details   Patient tolerance: patient tolerated the procedure well with no immediate complications               Current catheter removed without difficulty after deflation of an intact balloon. Site prepped with Betadine, new 18F  suprapubic spt change via aseptic technique without incident, 10 ml balloon inflated with sterile water. Irrigated easily for straw-yellow return, mild spasm noted. Patient tolerated well. Catheter plug attached.     Vitals:    03/18/24 1029   BP: 118/58   Pulse: 70   Weight: 71.9 kg (158 lb 9.6 oz)   Height: 5' 4\" (1.626 m)         Yanique Johnson RN   "

## 2024-03-19 NOTE — TELEPHONE ENCOUNTER
MSW attempted to reach patient's son, Min, to see if he was able to schedule the first in home visit with the [facility]. No answer at [phone]. MSW left a message requesting callback. Awaiting same.

## 2024-03-21 NOTE — TELEPHONE ENCOUNTER
LATE ENTRY FROM 3/20/24:    MSW retrieved a voicemail left by patient's son, Min ([phone]). He stated that he did schedule a visit with the [place] [employer] the first week of April.     MSW will follow to ensure that the process continues in a timely manner.

## 2024-04-01 ENCOUNTER — TELEPHONE (OUTPATIENT)
Dept: INTERNAL MEDICINE CLINIC | Facility: CLINIC | Age: 84
End: 2024-04-01

## 2024-04-01 NOTE — TELEPHONE ENCOUNTER
Patient's son Min Dacosta came in requesting we fill out an IEB for his father to get help in the home.  Son states these forms are online and we should have them.  Are you aware of anything like this.  Please advise.  Son states we either have them here or they are done online.  Thanks

## 2024-04-05 ENCOUNTER — TELEPHONE (OUTPATIENT)
Dept: INTERNAL MEDICINE CLINIC | Facility: CLINIC | Age: 84
End: 2024-04-05

## 2024-04-05 NOTE — TELEPHONE ENCOUNTER
Aleks's son called in to make an appointment for Aleks as he needs papers filled out for housing. He is going to call with a website for the office to print the forms as he does not have a printer.

## 2024-04-10 ENCOUNTER — PROCEDURE VISIT (OUTPATIENT)
Dept: UROLOGY | Facility: CLINIC | Age: 84
End: 2024-04-10
Payer: COMMERCIAL

## 2024-04-10 VITALS
HEART RATE: 74 BPM | DIASTOLIC BLOOD PRESSURE: 64 MMHG | HEIGHT: 64 IN | WEIGHT: 156.4 LBS | BODY MASS INDEX: 26.7 KG/M2 | SYSTOLIC BLOOD PRESSURE: 116 MMHG

## 2024-04-10 DIAGNOSIS — R33.9 URINARY RETENTION: Primary | ICD-10-CM

## 2024-04-10 DIAGNOSIS — N13.8 BPH WITH OBSTRUCTION/LOWER URINARY TRACT SYMPTOMS: ICD-10-CM

## 2024-04-10 DIAGNOSIS — N40.1 BPH WITH OBSTRUCTION/LOWER URINARY TRACT SYMPTOMS: ICD-10-CM

## 2024-04-10 DIAGNOSIS — N32.89 BLADDER SPASM: ICD-10-CM

## 2024-04-10 PROCEDURE — 51705 CHANGE OF BLADDER TUBE: CPT

## 2024-04-10 NOTE — PROGRESS NOTES
"4/10/2024    Aleks Dacosta  1940  [id number]    Diagnosis: Urinary retention, Bladder spasms, BPH with obstruction   Chief Complaint    SPT change         Patient presents for routine SPT exchange managed by Dr. Kathleen    Plan  Follow up in 3-4 weeks for next SPT change     Procedure: Suprapubic Tube Change       Cystostomy tube change     Date/Time  4/10/2024 1:00 PM     Performed by  Yanique Johnson RN   Authorized by  Jose Alberto Kathleen MD     Universal Protocol   Consent: Verbal consent obtained.  Risks and benefits: risks, benefits and alternatives were discussed  Consent given by: patient  Patient understanding: patient states understanding of the procedure being performed  Patient identity confirmed: verbally with patient      Local anesthesia used: no     Anesthesia   Local anesthesia used: no     Sedation   Patient sedated: no        Specimen: no    Culture: no   Procedure Details   Patient tolerance: patient tolerated the procedure well with no immediate complications               Current catheter removed without difficulty after deflation of an intact balloon. Site prepped with Hibiclens, new 18F  suprapubic spt change via aseptic technique without incident, 10 ml balloon inflated with sterile water. Irrigated easily for clear return, mild spasm noted. Patient tolerated well. Catheter plug attached.     Vitals:    04/10/24 1309   BP: 116/64   Pulse: 74   Weight: 70.9 kg (156 lb 6.4 oz)   Height: 5' 4\" (1.626 m)         Yanique Johnson RN   "

## 2024-04-10 NOTE — PROGRESS NOTES
I supervised the Advanced Practitioner.  I reviewed the Advanced Practitioner note and agree.    Jose Alberto Kathleen MD 04/10/24

## 2024-04-24 ENCOUNTER — OFFICE VISIT (OUTPATIENT)
Dept: INTERNAL MEDICINE CLINIC | Facility: CLINIC | Age: 84
End: 2024-04-24
Payer: MEDICARE

## 2024-04-24 VITALS
WEIGHT: 156 LBS | HEIGHT: 64 IN | OXYGEN SATURATION: 96 % | DIASTOLIC BLOOD PRESSURE: 68 MMHG | SYSTOLIC BLOOD PRESSURE: 134 MMHG | TEMPERATURE: 97.8 F | BODY MASS INDEX: 26.63 KG/M2 | HEART RATE: 60 BPM

## 2024-04-24 DIAGNOSIS — Z93.59 SUPRAPUBIC CATHETER (HCC): ICD-10-CM

## 2024-04-24 DIAGNOSIS — H90.3 BILATERAL SENSORINEURAL HEARING LOSS: ICD-10-CM

## 2024-04-24 DIAGNOSIS — R39.14 BENIGN PROSTATIC HYPERPLASIA WITH INCOMPLETE BLADDER EMPTYING: ICD-10-CM

## 2024-04-24 DIAGNOSIS — I10 ESSENTIAL HYPERTENSION: ICD-10-CM

## 2024-04-24 DIAGNOSIS — G31.84 MILD COGNITIVE IMPAIRMENT: ICD-10-CM

## 2024-04-24 DIAGNOSIS — D69.6 PLATELETS DECREASED (HCC): ICD-10-CM

## 2024-04-24 DIAGNOSIS — I51.89 DIASTOLIC DYSFUNCTION: ICD-10-CM

## 2024-04-24 DIAGNOSIS — Z86.73 HISTORY OF CVA (CEREBROVASCULAR ACCIDENT): ICD-10-CM

## 2024-04-24 DIAGNOSIS — N18.4 STAGE 4 CHRONIC KIDNEY DISEASE (HCC): ICD-10-CM

## 2024-04-24 DIAGNOSIS — Z02.89 ENCOUNTER FOR COMPLETION OF FORM WITH PATIENT: ICD-10-CM

## 2024-04-24 DIAGNOSIS — N40.1 BENIGN PROSTATIC HYPERPLASIA WITH INCOMPLETE BLADDER EMPTYING: ICD-10-CM

## 2024-04-24 DIAGNOSIS — F01.A0 MILD VASCULAR DEMENTIA WITHOUT BEHAVIORAL DISTURBANCE, PSYCHOTIC DISTURBANCE, MOOD DISTURBANCE, OR ANXIETY (HCC): Primary | ICD-10-CM

## 2024-04-24 DIAGNOSIS — C61 MALIGNANT NEOPLASM OF PROSTATE (HCC): ICD-10-CM

## 2024-04-24 PROCEDURE — 99214 OFFICE O/P EST MOD 30 MIN: CPT | Performed by: INTERNAL MEDICINE

## 2024-04-24 PROCEDURE — G2211 COMPLEX E/M VISIT ADD ON: HCPCS | Performed by: INTERNAL MEDICINE

## 2024-04-24 RX ORDER — DONEPEZIL HYDROCHLORIDE 5 MG/1
5 TABLET, FILM COATED ORAL
Qty: 90 TABLET | Refills: 1 | Status: SHIPPED | OUTPATIENT
Start: 2024-04-24 | End: 2024-04-24 | Stop reason: SDUPTHER

## 2024-04-24 RX ORDER — DONEPEZIL HYDROCHLORIDE 10 MG/1
10 TABLET, FILM COATED ORAL
Qty: 90 TABLET | Refills: 1 | Status: SHIPPED | OUTPATIENT
Start: 2024-04-24
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
## 2024-05-03 ENCOUNTER — PROCEDURE VISIT (OUTPATIENT)
Dept: UROLOGY | Facility: CLINIC | Age: 84
End: 2024-05-03
Payer: MEDICARE

## 2024-05-03 VITALS
BODY MASS INDEX: 26.4 KG/M2 | HEIGHT: 64 IN | WEIGHT: 154.6 LBS | DIASTOLIC BLOOD PRESSURE: 64 MMHG | SYSTOLIC BLOOD PRESSURE: 122 MMHG | HEART RATE: 68 BPM

## 2024-05-03 DIAGNOSIS — N13.8 BPH WITH OBSTRUCTION/LOWER URINARY TRACT SYMPTOMS: ICD-10-CM

## 2024-05-03 DIAGNOSIS — N32.89 BLADDER SPASMS: ICD-10-CM

## 2024-05-03 DIAGNOSIS — N40.1 BPH WITH OBSTRUCTION/LOWER URINARY TRACT SYMPTOMS: ICD-10-CM

## 2024-05-03 DIAGNOSIS — R33.9 URINARY RETENTION: Primary | ICD-10-CM

## 2024-05-03 PROCEDURE — 51705 CHANGE OF BLADDER TUBE: CPT

## 2024-05-03 NOTE — PROGRESS NOTES
"5/3/2024    Aleks Dacosta  1940  [id number]    Diagnosis: Urinary retention, BPH with obstruction, Bladder spasms   Chief Complaint    SPT change         Patient presents for routine SPT exchange managed by Dr. Kathleen    Plan  Follow up in 3 weeks for next SPT change     Procedure: Suprapubic Tube Change       Cystostomy tube change     Date/Time  5/3/2024 3:00 PM     Performed by  Yanique Johnson RN   Authorized by  DOLORES Styles     Universal Protocol   Consent: Verbal consent obtained.  Risks and benefits: risks, benefits and alternatives were discussed  Consent given by: patient  Patient understanding: patient states understanding of the procedure being performed  Patient identity confirmed: verbally with patient      Local anesthesia used: no     Anesthesia   Local anesthesia used: no     Sedation   Patient sedated: no        Specimen: no    Culture: no   Procedure Details   Patient tolerance: patient tolerated the procedure well with no immediate complications               Current catheter removed without difficulty after deflation of an intact balloon. Site prepped with Hibiclens, new 18F  suprapubic spt change via aseptic technique without incident, 10 ml balloon inflated with sterile water. Irrigated easily for clear return, mild spasm noted. Patient tolerated well. Catheter plug attached.     Vitals:    05/03/24 1510   BP: 122/64   Pulse: 68   Weight: 70.1 kg (154 lb 9.6 oz)   Height: 5' 4\" (1.626 m)         Yanique Johnson RN   "

## 2024-05-28 ENCOUNTER — LAB (OUTPATIENT)
Dept: LAB | Facility: CLINIC | Age: 84
End: 2024-05-28
Payer: MEDICARE

## 2024-05-28 ENCOUNTER — PROCEDURE VISIT (OUTPATIENT)
Dept: UROLOGY | Facility: CLINIC | Age: 84
End: 2024-05-28
Payer: MEDICARE

## 2024-05-28 VITALS
DIASTOLIC BLOOD PRESSURE: 68 MMHG | WEIGHT: 152.1 LBS | SYSTOLIC BLOOD PRESSURE: 124 MMHG | HEIGHT: 64 IN | HEART RATE: 70 BPM | BODY MASS INDEX: 25.97 KG/M2

## 2024-05-28 DIAGNOSIS — F01.A0 MILD VASCULAR DEMENTIA WITHOUT BEHAVIORAL DISTURBANCE, PSYCHOTIC DISTURBANCE, MOOD DISTURBANCE, OR ANXIETY (HCC): ICD-10-CM

## 2024-05-28 DIAGNOSIS — R33.9 URINARY RETENTION: Primary | ICD-10-CM

## 2024-05-28 DIAGNOSIS — I10 ESSENTIAL HYPERTENSION: ICD-10-CM

## 2024-05-28 DIAGNOSIS — N32.89 BLADDER SPASMS: ICD-10-CM

## 2024-05-28 DIAGNOSIS — N18.4 STAGE 4 CHRONIC KIDNEY DISEASE (HCC): ICD-10-CM

## 2024-05-28 DIAGNOSIS — N40.1 BPH WITH OBSTRUCTION/LOWER URINARY TRACT SYMPTOMS: ICD-10-CM

## 2024-05-28 DIAGNOSIS — N13.8 BPH WITH OBSTRUCTION/LOWER URINARY TRACT SYMPTOMS: ICD-10-CM

## 2024-05-28 DIAGNOSIS — G31.84 MILD COGNITIVE IMPAIRMENT: ICD-10-CM

## 2024-05-28 LAB
ALBUMIN SERPL BCP-MCNC: 4 G/DL (ref 3.5–5)
ALP SERPL-CCNC: 55 U/L (ref 34–104)
ALT SERPL W P-5'-P-CCNC: 8 U/L (ref 7–52)
ANION GAP SERPL CALCULATED.3IONS-SCNC: 10 MMOL/L (ref 4–13)
AST SERPL W P-5'-P-CCNC: 11 U/L (ref 13–39)
BACTERIA UR QL AUTO: ABNORMAL /HPF
BILIRUB SERPL-MCNC: 0.7 MG/DL (ref 0.2–1)
BILIRUB UR QL STRIP: NEGATIVE
BUN SERPL-MCNC: 28 MG/DL (ref 5–25)
CALCIUM SERPL-MCNC: 8.7 MG/DL (ref 8.4–10.2)
CHLORIDE SERPL-SCNC: 108 MMOL/L (ref 96–108)
CLARITY UR: ABNORMAL
CO2 SERPL-SCNC: 25 MMOL/L (ref 21–32)
COLOR UR: YELLOW
CREAT SERPL-MCNC: 1.65 MG/DL (ref 0.6–1.3)
GFR SERPL CREATININE-BSD FRML MDRD: 37 ML/MIN/1.73SQ M
GLUCOSE P FAST SERPL-MCNC: 108 MG/DL (ref 65–99)
GLUCOSE UR STRIP-MCNC: NEGATIVE MG/DL
HGB UR QL STRIP.AUTO: ABNORMAL
KETONES UR STRIP-MCNC: NEGATIVE MG/DL
LDLC SERPL DIRECT ASSAY-MCNC: 78 MG/DL (ref 0–100)
LEUKOCYTE ESTERASE UR QL STRIP: ABNORMAL
MUCOUS THREADS UR QL AUTO: ABNORMAL
NITRITE UR QL STRIP: POSITIVE
NON-SQ EPI CELLS URNS QL MICRO: ABNORMAL /HPF
PH UR STRIP.AUTO: 7.5 [PH]
POTASSIUM SERPL-SCNC: 4.4 MMOL/L (ref 3.5–5.3)
PROT SERPL-MCNC: 6.5 G/DL (ref 6.4–8.4)
PROT UR STRIP-MCNC: ABNORMAL MG/DL
RBC #/AREA URNS AUTO: ABNORMAL /HPF
RENAL EPI CELLS #/AREA URNS HPF: PRESENT /[HPF]
SODIUM SERPL-SCNC: 143 MMOL/L (ref 135–147)
SP GR UR STRIP.AUTO: 1.01 (ref 1–1.03)
TRANS CELLS #/AREA URNS HPF: PRESENT /[HPF]
TRIGL SERPL-MCNC: 117 MG/DL
UROBILINOGEN UR STRIP-ACNC: <2 MG/DL
WBC #/AREA URNS AUTO: ABNORMAL /HPF
WBC CLUMPS # UR AUTO: PRESENT /UL

## 2024-05-28 PROCEDURE — 87086 URINE CULTURE/COLONY COUNT: CPT

## 2024-05-28 PROCEDURE — 80053 COMPREHEN METABOLIC PANEL: CPT

## 2024-05-28 PROCEDURE — 82570 ASSAY OF URINE CREATININE: CPT

## 2024-05-28 PROCEDURE — 51705 CHANGE OF BLADDER TUBE: CPT

## 2024-05-28 PROCEDURE — 83721 ASSAY OF BLOOD LIPOPROTEIN: CPT

## 2024-05-28 PROCEDURE — 36415 COLL VENOUS BLD VENIPUNCTURE: CPT

## 2024-05-28 PROCEDURE — 81001 URINALYSIS AUTO W/SCOPE: CPT

## 2024-05-28 PROCEDURE — 82043 UR ALBUMIN QUANTITATIVE: CPT

## 2024-05-28 PROCEDURE — 84478 ASSAY OF TRIGLYCERIDES: CPT

## 2024-05-28 PROCEDURE — 87186 SC STD MICRODIL/AGAR DIL: CPT

## 2024-05-28 NOTE — PROGRESS NOTES
"5/28/2024    Aleks Dacosta  1940  [id number]    Diagnosis: Urinary retention, BPH with obstruction, Bladder spasms   Chief Complaint    SPT change         Patient presents for routine SPT exchange managed by Dr. Kathleen    Plan  Follow up in 3 weeks for next SPT change     Procedure: Suprapubic Tube Change       Cystostomy tube change     Date/Time  5/28/2024 10:30 AM     Performed by  Yanique Johnson RN   Authorized by  DOLORES Styles     Universal Protocol   Consent: Verbal consent obtained.  Risks and benefits: risks, benefits and alternatives were discussed  Consent given by: patient  Patient understanding: patient states understanding of the procedure being performed  Patient identity confirmed: verbally with patient      Local anesthesia used: no     Anesthesia   Local anesthesia used: no     Sedation   Patient sedated: no        Specimen: no    Culture: no   Procedure Details   Patient tolerance: patient tolerated the procedure well with no immediate complications               Current catheter removed without difficulty after deflation of an intact balloon. Site prepped with Betadine, new 18F  suprapubic spt change via aseptic technique without incident, 10 ml balloon inflated with sterile water. Irrigated easily for clear return, mild spasm noted. Patient tolerated well. Catheter plug attached.     Vitals:    05/28/24 1034   BP: 124/68   Pulse: 70   Weight: 69 kg (152 lb 1.6 oz)   Height: 5' 4\" (1.626 m)         Yanique Johnson RN   "

## 2024-05-29 ENCOUNTER — TELEPHONE (OUTPATIENT)
Dept: INTERNAL MEDICINE CLINIC | Facility: CLINIC | Age: 84
End: 2024-05-29

## 2024-05-29 DIAGNOSIS — N18.4 STAGE 4 CHRONIC KIDNEY DISEASE (HCC): Primary | ICD-10-CM

## 2024-05-29 LAB
CREAT UR-MCNC: 110.8 MG/DL
MICROALBUMIN UR-MCNC: 448.5 MG/L
MICROALBUMIN/CREAT 24H UR: 405 MG/G CREATININE (ref 0–30)

## 2024-05-29 RX ORDER — LISINOPRIL 2.5 MG/1
2.5 TABLET ORAL DAILY
Qty: 90 TABLET | Refills: 1 | Status: SHIPPED | OUTPATIENT
Start: 2024-05-29

## 2024-05-29 NOTE — TELEPHONE ENCOUNTER
----- Message from Stevenson Baptiste DO sent at 5/29/2024  6:19 AM EDT -----  Call pt, labs ok except for continue elevated renal function, increased protein in the urine, and ?UTI. Recommend adding low dose ACEI. Await urine c/s

## 2024-05-31 ENCOUNTER — TELEPHONE (OUTPATIENT)
Dept: INTERNAL MEDICINE CLINIC | Facility: CLINIC | Age: 84
End: 2024-05-31

## 2024-05-31 ENCOUNTER — TELEPHONE (OUTPATIENT)
Age: 84
End: 2024-05-31

## 2024-05-31 DIAGNOSIS — N39.0 URINARY TRACT INFECTION WITHOUT HEMATURIA, SITE UNSPECIFIED: Primary | ICD-10-CM

## 2024-05-31 LAB
BACTERIA UR CULT: ABNORMAL

## 2024-05-31 RX ORDER — AMOXICILLIN AND CLAVULANATE POTASSIUM 562.5; 437.5; 62.5 MG/1; MG/1; MG/1
1 TABLET, MULTILAYER, EXTENDED RELEASE ORAL 2 TIMES DAILY
Qty: 20 TABLET | Refills: 0 | Status: SHIPPED | OUTPATIENT
Start: 2024-05-31 | End: 2024-06-10

## 2024-05-31 NOTE — TELEPHONE ENCOUNTER
----- Message from Stevenson Baptiste DO sent at 5/31/2024 11:09 AM EDT -----  Call care taker. Abx sent in for multiorganism UTI.

## 2024-06-26 ENCOUNTER — TELEPHONE (OUTPATIENT)
Age: 84
End: 2024-06-26

## 2024-06-26 NOTE — TELEPHONE ENCOUNTER
PT son calling to see if he needs to rescheduled fallon that he missed on 6/20/24.  Would like to try and wait to next catheter changed on 7/12/24 if possible.  Please advise. Pt is not having any catheter issues at this time.    Pt son can be reached at [phone] vm can be left is needed.

## 2024-06-26 NOTE — TELEPHONE ENCOUNTER
Returned call to patients son Min who states patients SPT is draining well and he is not having any issues. Patient typically gets SPT changed every 3 weeks due to spasms, however son states patient is doing well at this time from that standpoint. He wishes to wait until next scheduled SPT change on 7/12 which will be 6 weeks from the last change. If patient has any issues in the meantime, son will call to move up nurse visit.

## 2024-07-12 ENCOUNTER — PROCEDURE VISIT (OUTPATIENT)
Dept: UROLOGY | Facility: CLINIC | Age: 84
End: 2024-07-12
Payer: MEDICARE

## 2024-07-12 VITALS
HEART RATE: 66 BPM | SYSTOLIC BLOOD PRESSURE: 110 MMHG | HEIGHT: 64 IN | BODY MASS INDEX: 26.05 KG/M2 | WEIGHT: 152.6 LBS | DIASTOLIC BLOOD PRESSURE: 62 MMHG

## 2024-07-12 DIAGNOSIS — N40.1 BPH WITH OBSTRUCTION/LOWER URINARY TRACT SYMPTOMS: ICD-10-CM

## 2024-07-12 DIAGNOSIS — N32.89 BLADDER SPASMS: ICD-10-CM

## 2024-07-12 DIAGNOSIS — N13.8 BPH WITH OBSTRUCTION/LOWER URINARY TRACT SYMPTOMS: ICD-10-CM

## 2024-07-12 DIAGNOSIS — R33.9 URINARY RETENTION: Primary | ICD-10-CM

## 2024-07-12 PROCEDURE — 51705 CHANGE OF BLADDER TUBE: CPT

## 2024-07-12 NOTE — PROGRESS NOTES
"7/12/2024    Aleks Dacosta  1940  [id number]    Diagnosis: Urinary retention, BPH with obstruction, Bladder spasms   Chief Complaint    SPT change         Patient presents for routine SPT exchange managed by Dr. Kathleen    Plan  Follow up in 3 weeks for next SPT change     Procedure: Suprapubic Tube Change       Cystostomy tube change     Date/Time  7/12/2024 10:30 AM     Performed by  Yanique Johnson RN   Authorized by  Jose Alberto Kathleen MD     Universal Protocol   Consent: Verbal consent obtained.  Risks and benefits: risks, benefits and alternatives were discussed  Consent given by: patient  Patient understanding: patient states understanding of the procedure being performed  Patient identity confirmed: verbally with patient      Local anesthesia used: no     Anesthesia   Local anesthesia used: no     Sedation   Patient sedated: no        Specimen: no    Culture: no   Procedure Details   Patient tolerance: patient tolerated the procedure well with no immediate complications               Current catheter removed without difficulty after deflation of an intact balloon. Site prepped with Hibiclens, new 18F  suprapubic spt change via aseptic technique without incident, 10 ml balloon inflated with sterile water. Irrigated easily for clear return, mild spasm noted. Patient tolerated well. Catheter plug attached.     Vitals:    07/12/24 1034   BP: 110/62   Pulse: 66   Weight: 69.2 kg (152 lb 9.6 oz)   Height: 5' 4\" (1.626 m)         Yanique Johnson RN   "

## 2024-08-01 ENCOUNTER — PROCEDURE VISIT (OUTPATIENT)
Dept: UROLOGY | Facility: CLINIC | Age: 84
End: 2024-08-01
Payer: MEDICARE

## 2024-08-01 VITALS
HEIGHT: 64 IN | HEART RATE: 72 BPM | WEIGHT: 151 LBS | DIASTOLIC BLOOD PRESSURE: 60 MMHG | BODY MASS INDEX: 25.78 KG/M2 | SYSTOLIC BLOOD PRESSURE: 108 MMHG

## 2024-08-01 DIAGNOSIS — N40.1 BPH WITH OBSTRUCTION/LOWER URINARY TRACT SYMPTOMS: ICD-10-CM

## 2024-08-01 DIAGNOSIS — R33.9 URINARY RETENTION: Primary | ICD-10-CM

## 2024-08-01 DIAGNOSIS — N13.8 BPH WITH OBSTRUCTION/LOWER URINARY TRACT SYMPTOMS: ICD-10-CM

## 2024-08-01 DIAGNOSIS — N32.89 BLADDER SPASMS: ICD-10-CM

## 2024-08-01 PROCEDURE — 51705 CHANGE OF BLADDER TUBE: CPT

## 2024-08-01 NOTE — PROGRESS NOTES
"8/1/2024    Aleks Dacosta  1940  [id number]    Diagnosis: Urinary retention, BPH, Bladder spasms   Chief Complaint    SPT change         Patient presents for routine SPT exchange managed by Dr. Kathleen    Plan  Follow up in 3 weeks for next SPT change and AP follow up    Procedure: Suprapubic Tube Change       Cystostomy tube change     Date/Time  8/1/2024 10:30 AM     Performed by  Yanique Johnson RN   Authorized by  Jose Alberto Kathleen MD     Universal Protocol   Consent: Verbal consent obtained.  Risks and benefits: risks, benefits and alternatives were discussed  Consent given by: patient  Patient understanding: patient states understanding of the procedure being performed  Patient identity confirmed: verbally with patient      Local anesthesia used: no     Anesthesia   Local anesthesia used: no     Sedation   Patient sedated: no        Specimen: no    Culture: no   Procedure Details   Patient tolerance: patient tolerated the procedure well with no immediate complications               Current catheter removed without difficulty after deflation of an intact balloon. Site prepped with Hibiclens, new 18F  suprapubic spt change via aseptic technique without incident, 10 ml balloon inflated with sterile water. Irrigated easily for clear return, mild spasm noted. Patient tolerated well. Catheter plug attached.      Vitals:    08/01/24 1032   Height: 5' 4\" (1.626 m)         Yanique Johnson RN   "

## 2024-08-20 ENCOUNTER — OFFICE VISIT (OUTPATIENT)
Dept: UROLOGY | Facility: CLINIC | Age: 84
End: 2024-08-20
Payer: MEDICARE

## 2024-08-20 VITALS
OXYGEN SATURATION: 96 % | HEIGHT: 64 IN | SYSTOLIC BLOOD PRESSURE: 126 MMHG | DIASTOLIC BLOOD PRESSURE: 78 MMHG | WEIGHT: 147.4 LBS | TEMPERATURE: 96.9 F | BODY MASS INDEX: 25.16 KG/M2 | HEART RATE: 101 BPM

## 2024-08-20 DIAGNOSIS — R33.9 URINARY RETENTION: Primary | ICD-10-CM

## 2024-08-20 DIAGNOSIS — N13.8 BPH WITH OBSTRUCTION/LOWER URINARY TRACT SYMPTOMS: ICD-10-CM

## 2024-08-20 DIAGNOSIS — B37.2 CUTANEOUS CANDIDIASIS: ICD-10-CM

## 2024-08-20 DIAGNOSIS — N40.1 BPH WITH OBSTRUCTION/LOWER URINARY TRACT SYMPTOMS: ICD-10-CM

## 2024-08-20 DIAGNOSIS — C61 MALIGNANT NEOPLASM OF PROSTATE (HCC): ICD-10-CM

## 2024-08-20 PROCEDURE — 99213 OFFICE O/P EST LOW 20 MIN: CPT

## 2024-08-20 RX ORDER — NYSTATIN 100000 [USP'U]/G
POWDER TOPICAL 2 TIMES DAILY
Qty: 30 G | Refills: 1 | Status: SHIPPED | OUTPATIENT
Start: 2024-08-20 | End: 2024-08-20

## 2024-08-20 RX ORDER — NYSTATIN 100000 U/G
CREAM TOPICAL 2 TIMES DAILY
Qty: 30 G | Refills: 1 | Status: SHIPPED | OUTPATIENT
Start: 2024-08-20

## 2024-08-20 NOTE — PROGRESS NOTES
8/20/2024    No chief complaint on file.      Assessment and Plan    84 y.o. male manage by AP Team and Dr. Kathleen    Incomplete bladder emptying  Cutaneous candidiasis   History of BPH with large volume retention. S/p TURP and SP tube placement July 2021. Unfortunately, his bladder never recovered and he has been managed with a chronic SPT since   SPT changed today by office nurse Yanique.   He does have some erythema and scaly patches note around the insertion site consistent with a yeast infection. I sent a prescription for nystatin cream to his pharmacy. He was instructed to be careful not to apply the cream to in insertion site directly.   He will be moving soon and plans to establish care with a new Urologist. He was provided with extra catheter supplies today. He will call if anything changes and we would be happy to see him back.     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  Baseline PSA following TURP was 1.7 on 8/16/2021. Current PSA 3.66 as of 10/10/2023.  He has order for updated PSA in October.   Recommendations are to continue with active surveillance. Consider intermittent ADT in the future based on PSA trend  Encourage patient to have updated PSA and we can forward those results to his new Urologist.       Interval HPI:    History of Present Illness  Aleks Dacosta is a 84 y.o. male here for annual follow-up evaluation of chronic urinary retention manage with SPT    Established patient known to Dr. Kathleen, last seen August 2023. He has a history of BPH with obstruction with large volume urinary retention, 2L retention with bilateral hydronephrosis. He underwent cystoscopy and transurethral resection of prostate and SP tube placement by Dr. Kathleen July 22, 2021.  Unfortunately it appears his bladder never woke up. He remains with a suprapubic tube exchanged every 4-6 weeks.  CT scan of the chest abdomen  pelvis May 22, 2023 which showed the SP tube in good position, normal kidneys no hydronephrosis and there was bladder wall thickening with ill-defined fat planes and multiple urinary bladder calcifications.     Prostate cancer: Prostate chips did show [place] 3+4=7 prostatic adenocarcinoma involving 5% of prostatic tissue. Due to patient age Dr. Kathleen did not recommend prostate biopsy and instead recommended getting a baseline PSA and consider intermittent ADT in the future. Baseline PSA following TURP was 1.7 on 8/16/2021, current PSA 3.66 as of 10/10/2023.           Review of Systems   Constitutional:  Negative for chills and fever.   HENT:  Negative for congestion and sore throat.    Respiratory:  Negative for cough and shortness of breath.    Cardiovascular:  Negative for chest pain and leg swelling.   Gastrointestinal:  Negative for abdominal pain, constipation and diarrhea.   Genitourinary:  Negative for difficulty urinating, dysuria, frequency, hematuria and urgency.   Musculoskeletal:  Negative for back pain and gait problem.   Skin:  Negative for wound.   Allergic/Immunologic: Negative for immunocompromised state.   Hematological:  Does not bruise/bleed easily.               Vitals  There were no vitals filed for this visit.    Physical Exam  Vitals reviewed.   Constitutional:       General: He is not in acute distress.     Appearance: Normal appearance. He is not ill-appearing or toxic-appearing.   HENT:      Head: Normocephalic and atraumatic.   Eyes:      General: No scleral icterus.     Conjunctiva/sclera: Conjunctivae normal.   Cardiovascular:      Rate and Rhythm: Normal rate.   Pulmonary:      Effort: Pulmonary effort is normal. No respiratory distress.   Abdominal:      Tenderness: There is no right CVA tenderness or left CVA tenderness.      Hernia: No hernia is present.   Genitourinary:     Comments: Erythematous scaly patches noted around SPT insertion site. Few of the patches do have open  sores, not cellulitis or drainage noted to insertion site. Findings are consistent with yeast infection.   Musculoskeletal:      Cervical back: Normal range of motion.      Right lower leg: No edema.      Left lower leg: No edema.   Skin:     General: Skin is warm and dry.      Coloration: Skin is not jaundiced or pale.   Neurological:      General: No focal deficit present.      Mental Status: He is alert and oriented to person, place, and time. Mental status is at baseline.      Gait: Gait normal.   Psychiatric:         Mood and Affect: Mood normal.         Behavior: Behavior normal.         Thought Content: Thought content normal.         Judgment: Judgment normal.         Past History  Past Medical History:   Diagnosis Date    Hypertension     Urinary retention      Social History     Socioeconomic History    Marital status:      Spouse name: Not on file    Number of children: Not on file    Years of education: Not on file    Highest education level: Not on file   Occupational History    Not on file   Tobacco Use    Smoking status: Never     Passive exposure: Never    Smokeless tobacco: Current     Types: Chew   Vaping Use    Vaping status: Never Used   Substance and Sexual Activity    Alcohol use: Never    Drug use: Never    Sexual activity: Not Currently   Other Topics Concern    Not on file   Social History Narrative        Four or five children    Lives with son    Ex [employer]    Retired maintenance.      Social Determinants of Health     Financial Resource Strain: Low Risk  (7/17/2023)    Overall Financial Resource Strain (CARDIA)     Difficulty of Paying Living Expenses: Not hard at all   Food Insecurity: No Food Insecurity (5/23/2023)    Hunger Vital Sign     Worried About Running Out of Food in the Last Year: Never true     Ran Out of Food in the Last Year: Never true   Transportation Needs: No Transportation Needs (7/17/2023)    PRAPARE - Transportation     Lack of Transportation (Medical): No      Lack of Transportation (Non-Medical): No   Physical Activity: Not on file   Stress: Not on file   Social Connections: Not on file   Intimate Partner Violence: Not on file   Housing Stability: Low Risk  (5/23/2023)    Housing Stability Vital Sign     Unable to Pay for Housing in the Last Year: No     Number of Places Lived in the Last Year: 1     Unstable Housing in the Last Year: No     Social History     Tobacco Use   Smoking Status Never    Passive exposure: Never   Smokeless Tobacco Current    Types: Chew     No family history on file.    The following portions of the patient's history were reviewed and updated as appropriate allergies, current medications, past medical history, past social history, past surgical history and problem list    Imaging:    Results  No results found for this or any previous visit (from the past 1 hour(s)).]  Lab Results   Component Value Date    PSA 3.66 10/10/2023    PSA 1.7 08/16/2021     Lab Results   Component Value Date    CALCIUM 8.7 05/28/2024    K 4.4 05/28/2024    CO2 25 05/28/2024     05/28/2024    BUN 28 (H) 05/28/2024    CREATININE 1.65 (H) 05/28/2024     Lab Results   Component Value Date    WBC 7.07 05/23/2023    HGB 12.5 05/23/2023    HCT 38.0 05/23/2023    MCV 97 05/23/2023     (L) 05/23/2023       Please Note:  Voice dictation software has been used to create this document. There may be inadvertent transcriptions errors.     DOLORES Styles 08/20/24

## 2024-08-20 NOTE — PROGRESS NOTES
"8/20/2024    Aleks Dacosta  1940  [id number]    Diagnosis: Urinary retention, BPH with obstruction       Patient presents for routine SPT exchange and routine AP visit managed by Dr. Kathleen.     Plan  Per son, patient will be moving to [place] with his other son to reside permanently. Patient will no longer be following with [facility]. Son will contact our office to have records transferred after patient finds a new Urologist.     Procedure: Suprapubic Tube Change       Cystostomy tube change     Date/Time  8/20/2024 11:00 AM     Performed by  Yanique Johnson RN   Authorized by  DOLORES Styles     Universal Protocol   Consent: Verbal consent obtained.  Risks and benefits: risks, benefits and alternatives were discussed  Consent given by: patient  Patient understanding: patient states understanding of the procedure being performed  Patient identity confirmed: verbally with patient      Local anesthesia used: no     Anesthesia   Local anesthesia used: no     Sedation   Patient sedated: no        Specimen: no    Culture: no   Procedure Details   Patient tolerance: patient tolerated the procedure well with no immediate complications               Current catheter removed without difficulty after deflation of an intact balloon. Site prepped with Hibiclens, new 18F  suprapubic spt change via aseptic technique without incident, 10 ml balloon inflated with sterile water. irrigated easily for clear return, mild spasm noted. Patient tolerated well. Catheter plug attached. Patient noted with fungal area surrounding SPT site- script for Nystatin was sent to pharmacy.     Vitals:    08/20/24 1107   BP: 126/78   Pulse: 101   Temp: (!) 96.9 °F (36.1 °C)   SpO2: 96%   Weight: 66.9 kg (147 lb 6.4 oz)   Height: 5' 4\" (1.626 m)         Yanique Johnson RN   "

## 2024-08-23 ENCOUNTER — OFFICE VISIT (OUTPATIENT)
Dept: INTERNAL MEDICINE CLINIC | Facility: CLINIC | Age: 84
End: 2024-08-23
Payer: MEDICARE

## 2024-08-23 ENCOUNTER — APPOINTMENT (OUTPATIENT)
Dept: LAB | Facility: CLINIC | Age: 84
End: 2024-08-23
Payer: MEDICARE

## 2024-08-23 VITALS
OXYGEN SATURATION: 97 % | TEMPERATURE: 97.3 F | BODY MASS INDEX: 25.47 KG/M2 | DIASTOLIC BLOOD PRESSURE: 60 MMHG | HEIGHT: 64 IN | WEIGHT: 149.2 LBS | SYSTOLIC BLOOD PRESSURE: 112 MMHG | HEART RATE: 76 BPM

## 2024-08-23 DIAGNOSIS — N18.4 STAGE 4 CHRONIC KIDNEY DISEASE (HCC): ICD-10-CM

## 2024-08-23 DIAGNOSIS — Z13.1 SCREENING FOR DIABETES MELLITUS (DM): ICD-10-CM

## 2024-08-23 DIAGNOSIS — Z00.00 MEDICARE ANNUAL WELLNESS VISIT, SUBSEQUENT: ICD-10-CM

## 2024-08-23 DIAGNOSIS — I51.89 DIASTOLIC DYSFUNCTION: ICD-10-CM

## 2024-08-23 DIAGNOSIS — F01.A0 MILD VASCULAR DEMENTIA WITHOUT BEHAVIORAL DISTURBANCE, PSYCHOTIC DISTURBANCE, MOOD DISTURBANCE, OR ANXIETY (HCC): ICD-10-CM

## 2024-08-23 DIAGNOSIS — I10 ESSENTIAL HYPERTENSION: ICD-10-CM

## 2024-08-23 DIAGNOSIS — Z93.59 SUPRAPUBIC CATHETER (HCC): ICD-10-CM

## 2024-08-23 DIAGNOSIS — C61 MALIGNANT NEOPLASM OF PROSTATE (HCC): ICD-10-CM

## 2024-08-23 DIAGNOSIS — Z86.73 HISTORY OF CVA (CEREBROVASCULAR ACCIDENT): ICD-10-CM

## 2024-08-23 DIAGNOSIS — R97.20 RISING PSA LEVEL: ICD-10-CM

## 2024-08-23 DIAGNOSIS — I10 ESSENTIAL HYPERTENSION: Primary | ICD-10-CM

## 2024-08-23 DIAGNOSIS — M15.9 PRIMARY OSTEOARTHRITIS INVOLVING MULTIPLE JOINTS: ICD-10-CM

## 2024-08-23 LAB
ANION GAP SERPL CALCULATED.3IONS-SCNC: 7 MMOL/L (ref 4–13)
BASOPHILS # BLD AUTO: 0.09 THOUSANDS/ÂΜL (ref 0–0.1)
BASOPHILS NFR BLD AUTO: 1 % (ref 0–1)
BUN SERPL-MCNC: 33 MG/DL (ref 5–25)
CALCIUM SERPL-MCNC: 9.1 MG/DL (ref 8.4–10.2)
CHLORIDE SERPL-SCNC: 106 MMOL/L (ref 96–108)
CHOLEST SERPL-MCNC: 129 MG/DL
CO2 SERPL-SCNC: 26 MMOL/L (ref 21–32)
CREAT SERPL-MCNC: 1.66 MG/DL (ref 0.6–1.3)
EOSINOPHIL # BLD AUTO: 0.72 THOUSAND/ÂΜL (ref 0–0.61)
EOSINOPHIL NFR BLD AUTO: 9 % (ref 0–6)
ERYTHROCYTE [DISTWIDTH] IN BLOOD BY AUTOMATED COUNT: 13.1 % (ref 11.6–15.1)
EST. AVERAGE GLUCOSE BLD GHB EST-MCNC: 100 MG/DL
GFR SERPL CREATININE-BSD FRML MDRD: 37 ML/MIN/1.73SQ M
GLUCOSE P FAST SERPL-MCNC: 109 MG/DL (ref 65–99)
HBA1C MFR BLD: 5.1 %
HCT VFR BLD AUTO: 38.5 % (ref 36.5–49.3)
HDLC SERPL-MCNC: 42 MG/DL
HGB BLD-MCNC: 12.9 G/DL (ref 12–17)
IMM GRANULOCYTES # BLD AUTO: 0.04 THOUSAND/UL (ref 0–0.2)
IMM GRANULOCYTES NFR BLD AUTO: 1 % (ref 0–2)
LDLC SERPL CALC-MCNC: 64 MG/DL (ref 0–100)
LYMPHOCYTES # BLD AUTO: 3.14 THOUSANDS/ÂΜL (ref 0.6–4.47)
LYMPHOCYTES NFR BLD AUTO: 38 % (ref 14–44)
MCH RBC QN AUTO: 33.3 PG (ref 26.8–34.3)
MCHC RBC AUTO-ENTMCNC: 33.5 G/DL (ref 31.4–37.4)
MCV RBC AUTO: 100 FL (ref 82–98)
MONOCYTES # BLD AUTO: 0.43 THOUSAND/ÂΜL (ref 0.17–1.22)
MONOCYTES NFR BLD AUTO: 5 % (ref 4–12)
NEUTROPHILS # BLD AUTO: 3.87 THOUSANDS/ÂΜL (ref 1.85–7.62)
NEUTS SEG NFR BLD AUTO: 46 % (ref 43–75)
NRBC BLD AUTO-RTO: 0 /100 WBCS
PLATELET # BLD AUTO: 127 THOUSANDS/UL (ref 149–390)
PMV BLD AUTO: 10.3 FL (ref 8.9–12.7)
POTASSIUM SERPL-SCNC: 4.2 MMOL/L (ref 3.5–5.3)
PSA SERPL-MCNC: 6.64 NG/ML (ref 0–4)
RBC # BLD AUTO: 3.87 MILLION/UL (ref 3.88–5.62)
SODIUM SERPL-SCNC: 139 MMOL/L (ref 135–147)
TRIGL SERPL-MCNC: 113 MG/DL
TSH SERPL DL<=0.05 MIU/L-ACNC: 2.99 UIU/ML (ref 0.45–4.5)
WBC # BLD AUTO: 8.29 THOUSAND/UL (ref 4.31–10.16)

## 2024-08-23 PROCEDURE — 84443 ASSAY THYROID STIM HORMONE: CPT

## 2024-08-23 PROCEDURE — 85025 COMPLETE CBC W/AUTO DIFF WBC: CPT

## 2024-08-23 PROCEDURE — G0439 PPPS, SUBSEQ VISIT: HCPCS | Performed by: INTERNAL MEDICINE

## 2024-08-23 PROCEDURE — 80061 LIPID PANEL: CPT

## 2024-08-23 PROCEDURE — 99214 OFFICE O/P EST MOD 30 MIN: CPT | Performed by: INTERNAL MEDICINE

## 2024-08-23 PROCEDURE — 80048 BASIC METABOLIC PNL TOTAL CA: CPT

## 2024-08-23 PROCEDURE — 84153 ASSAY OF PSA TOTAL: CPT

## 2024-08-23 PROCEDURE — 36415 COLL VENOUS BLD VENIPUNCTURE: CPT

## 2024-08-23 PROCEDURE — 83036 HEMOGLOBIN GLYCOSYLATED A1C: CPT

## 2024-08-23 NOTE — PATIENT INSTRUCTIONS
Medicare Preventive Visit Patient Instructions  Thank you for completing your Welcome to Medicare Visit or Medicare Annual Wellness Visit today. Your next wellness visit will be due in one year (8/24/2025).  The screening/preventive services that you may require over the next 5-10 years are detailed below. Some tests may not apply to you based off risk factors and/or age. Screening tests ordered at today's visit but not completed yet may show as past due. Also, please note that scanned in results may not display below.  Preventive Screenings:  Service Recommendations Previous Testing/Comments   Colorectal Cancer Screening  Colonoscopy    Fecal Occult Blood Test (FOBT)/Fecal Immunochemical Test (FIT)  Fecal DNA/Cologuard Test  Flexible Sigmoidoscopy Age: 45-75 years old   Colonoscopy: every 10 years (May be performed more frequently if at higher risk)  OR  FOBT/FIT: every 1 year  OR  Cologuard: every 3 years  OR  Sigmoidoscopy: every 5 years  Screening may be recommended earlier than age 45 if at higher risk for colorectal cancer. Also, an individualized decision between you and your healthcare provider will decide whether screening between the ages of 76-85 would be appropriate. Colonoscopy: Not on file  FOBT/FIT: Not on file  Cologuard: Not on file  Sigmoidoscopy: Not on file          Prostate Cancer Screening Individualized decision between patient and health care provider in men between ages of 55-69   Medicare will cover every 12 months beginning on the day after your 50th birthday PSA: 3.66 ng/mL     History Prostate Cancer  Screening Not Indicated     Hepatitis C Screening Once for adults born between 1945 and 1965  More frequently in patients at high risk for Hepatitis C Hep C Antibody: Not on file        Diabetes Screening 1-2 times per year if you're at risk for diabetes or have pre-diabetes Fasting glucose: 108 mg/dL (5/28/2024)  A1C: 5.4 % (5/23/2023)  Screening Current   Cholesterol Screening Once every  5 years if you don't have a lipid disorder. May order more often based on risk factors. Lipid panel: 05/23/2023  Screening Current      Other Preventive Screenings Covered by Medicare:  Abdominal Aortic Aneurysm (AAA) Screening: covered once if your at risk. You're considered to be at risk if you have a family history of AAA or a male between the age of 65-75 who smoking at least 100 cigarettes in your lifetime.  Lung Cancer Screening: covers low dose CT scan once per year if you meet all of the following conditions: (1) Age 55-77; (2) No signs or symptoms of lung cancer; (3) Current smoker or have quit smoking within the last 15 years; (4) You have a tobacco smoking history of at least 20 pack years (packs per day x number of years you smoked); (5) You get a written order from a healthcare provider.  Glaucoma Screening: covered annually if you're considered high risk: (1) You have diabetes OR (2) Family history of glaucoma OR (3)  aged 50 and older OR (4)  American aged 65 and older  Osteoporosis Screening: covered every 2 years if you meet one of the following conditions: (1) Have a vertebral abnormality; (2) On glucocorticoid therapy for more than 3 months; (3) Have primary hyperparathyroidism; (4) On osteoporosis medications and need to assess response to drug therapy.  HIV Screening: covered annually if you're between the age of 15-65. Also covered annually if you are younger than 15 and older than 65 with risk factors for HIV infection. For pregnant patients, it is covered up to 3 times per pregnancy.    Immunizations:  Immunization Recommendations   Influenza Vaccine Annual influenza vaccination during flu season is recommended for all persons aged >= 6 months who do not have contraindications   Pneumococcal Vaccine   * Pneumococcal conjugate vaccine = PCV13 (Prevnar 13), PCV15 (Vaxneuvance), PCV20 (Prevnar 20)  * Pneumococcal polysaccharide vaccine = PPSV23 (Pneumovax) Adults 19-65 yo  with certain risk factors or if 65+ yo  If never received any pneumonia vaccine: recommend Prevnar 20 (PCV20)  Give PCV20 if previously received 1 dose of PCV13 or PPSV23   Hepatitis B Vaccine 3 dose series if at intermediate or high risk (ex: diabetes, end stage renal disease, liver disease)   Respiratory syncytial virus (RSV) Vaccine - COVERED BY MEDICARE PART D  * RSVPreF3 (Arexvy) CDC recommends that adults 60 years of age and older may receive a single dose of RSV vaccine using shared clinical decision-making (SCDM)   Tetanus (Td) Vaccine - COST NOT COVERED BY MEDICARE PART B Following completion of primary series, a booster dose should be given every 10 years to maintain immunity against tetanus. Td may also be given as tetanus wound prophylaxis.   Tdap Vaccine - COST NOT COVERED BY MEDICARE PART B Recommended at least once for all adults. For pregnant patients, recommended with each pregnancy.   Shingles Vaccine (Shingrix) - COST NOT COVERED BY MEDICARE PART B  2 shot series recommended in those 19 years and older who have or will have weakened immune systems or those 50 years and older     Health Maintenance Due:  There are no preventive care reminders to display for this patient.  Immunizations Due:      Topic Date Due   • COVID-19 Vaccine (2 - 2023-24 season) 09/01/2023   • Influenza Vaccine (1) 09/01/2024     Advance Directives   What are advance directives?  Advance directives are legal documents that state your wishes and plans for medical care. These plans are made ahead of time in case you lose your ability to make decisions for yourself. Advance directives can apply to any medical decision, such as the treatments you want, and if you want to donate organs.   What are the types of advance directives?  There are many types of advance directives, and each state has rules about how to use them. You may choose a combination of any of the following:  Living will:  This is a written record of the treatment  you want. You can also choose which treatments you do not want, which to limit, and which to stop at a certain time. This includes surgery, medicine, IV fluid, and tube feedings.   Durable power of  for healthcare (DPAHC):  This is a written record that states who you want to make healthcare choices for you when you are unable to make them for yourself. This person, called a proxy, is usually a family member or a friend. You may choose more than 1 proxy.  Do not resuscitate (DNR) order:  A DNR order is used in case your heart stops beating or you stop breathing. It is a request not to have certain forms of treatment, such as CPR. A DNR order may be included in other types of advance directives.  Medical directive:  This covers the care that you want if you are in a coma, near death, or unable to make decisions for yourself. You can list the treatments you want for each condition. Treatment may include pain medicine, surgery, blood transfusions, dialysis, IV or tube feedings, and a ventilator (breathing machine).  Values history:  This document has questions about your views, beliefs, and how you feel and think about life. This information can help others choose the care that you would choose.  Why are advance directives important?  An advance directive helps you control your care. Although spoken wishes may be used, it is better to have your wishes written down. Spoken wishes can be misunderstood, or not followed. Treatments may be given even if you do not want them. An advance directive may make it easier for your family to make difficult choices about your care.   Fall Prevention    Fall prevention  includes ways to make your home and other areas safer. It also includes ways you can move more carefully to prevent a fall. Health conditions that cause changes in your blood pressure, vision, or muscle strength and coordination may increase your risk for falls. Medicines may also increase your risk for falls  if they make you dizzy, weak, or sleepy.   Fall prevention tips:   Stand or sit up slowly.    Use assistive devices as directed.    Wear shoes that fit well and have soles that .    Wear a personal alarm.    Stay active.    Manage your medical conditions.    Home Safety Tips:  Add items to prevent falls in the bathroom.    Keep paths clear.    Install bright lights in your home.    Keep items you use often on shelves within reach.    Paint or place reflective tape on the edges of your stairs.    How to Quit Using Smokeless Tobacco   Why it is important to stop using smokeless tobacco:  Smokeless tobacco comes in many forms. Examples include chew, snuff, dip, dissolvable tobacco, and snus. All smokeless tobacco products contain nicotine and may contain as much nicotine as 3 cigarettes. You may be physically dependent on nicotine. You may also be emotionally addicted to it. The cravings can be strong, but it is important to quit using smokeless tobacco. You will improve your health and decrease your cancer, stroke, and heart attack risk. Mouth sores and tooth problems will also improve when you quit. You can benefit from quitting no matter how long you have used smokeless tobacco.   Prepare to stop using smokeless tobacco:  Nicotine is a highly addictive drug. Withdrawal symptoms can happen when you stop and make it hard to quit. The following can help keep you on track:  Set a quit date.    Tell friends, family, and coworkers that you plan to quit.    Remove all smokeless tobacco products from your home, car, and workplace.    Manage weight gain after you quit:  Nicotine can affect your metabolism. You may gain a few pounds after you quit. The following can help you control your weight:  Eat healthy foods.    Drink water before, during, and between meals.    Exercise as directed.      Weight Management   Why it is important to manage your weight:  Being overweight increases your risk of health conditions such as  heart disease, high blood pressure, type 2 diabetes, and certain types of cancer. It can also increase your risk for osteoarthritis, sleep apnea, and other respiratory problems. Aim for a slow, steady weight loss. Even a small amount of weight loss can lower your risk of health problems.  How to lose weight safely:  A safe and healthy way to lose weight is to eat fewer calories and get regular exercise. You can lose up about 1 pound a week by decreasing the number of calories you eat by 500 calories each day.   Healthy meal plan for weight management:  A healthy meal plan includes a variety of foods, contains fewer calories, and helps you stay healthy. A healthy meal plan includes the following:  Eat whole-grain foods more often.  A healthy meal plan should contain fiber. Fiber is the part of grains, fruits, and vegetables that is not broken down by your body. Whole-grain foods are healthy and provide extra fiber in your diet. Some examples of whole-grain foods are whole-wheat breads and pastas, oatmeal, brown rice, and bulgur.  Eat a variety of vegetables every day.  Include dark, leafy greens such as spinach, kale, krissy greens, and mustard greens. Eat yellow and orange vegetables such as carrots, sweet potatoes, and winter squash.   Eat a variety of fruits every day.  Choose fresh or canned fruit (canned in its own juice or light syrup) instead of juice. Fruit juice has very little or no fiber.  Eat low-fat dairy foods.  Drink fat-free (skim) milk or 1% milk. Eat fat-free yogurt and low-fat cottage cheese. Try low-fat cheeses such as mozzarella and other reduced-fat cheeses.  Choose meat and other protein foods that are low in fat.  Choose beans or other legumes such as split peas or lentils. Choose fish, skinless poultry (chicken or turkey), or lean cuts of red meat (beef or pork). Before you cook meat or poultry, cut off any visible fat.   Use less fat and oil.  Try baking foods instead of frying them. Add  less fat, such as margarine, sour cream, regular salad dressing and mayonnaise to foods. Eat fewer high-fat foods. Some examples of high-fat foods include french fries, doughnuts, ice cream, and cakes.  Eat fewer sweets.  Limit foods and drinks that are high in sugar. This includes candy, cookies, regular soda, and sweetened drinks.  Exercise:  Exercise at least 30 minutes per day on most days of the week. Some examples of exercise include walking, biking, dancing, and swimming. You can also fit in more physical activity by taking the stairs instead of the elevator or parking farther away from stores. Ask your healthcare provider about the best exercise plan for you.      © Copyright [employer] 2018 Information is for End User's use only and may not be sold, redistributed or otherwise used for commercial purposes. All illustrations and images included in CareNotes® are the copyrighted property of [employer]. or [employer]    Medicare Preventive Visit Patient Instructions  Thank you for completing your Welcome to Medicare Visit or Medicare Annual Wellness Visit today. Your next wellness visit will be due in one year (8/24/2025).  The screening/preventive services that you may require over the next 5-10 years are detailed below. Some tests may not apply to you based off risk factors and/or age. Screening tests ordered at today's visit but not completed yet may show as past due. Also, please note that scanned in results may not display below.  Preventive Screenings:  Service Recommendations Previous Testing/Comments   Colorectal Cancer Screening  Colonoscopy    Fecal Occult Blood Test (FOBT)/Fecal Immunochemical Test (FIT)  Fecal DNA/Cologuard Test  Flexible Sigmoidoscopy Age: 45-75 years old   Colonoscopy: every 10 years (May be performed more frequently if at higher risk)  OR  FOBT/FIT: every 1 year  OR  Cologuard: every 3 years  OR  Sigmoidoscopy: every 5 years  Screening may be recommended earlier  than age 45 if at higher risk for colorectal cancer. Also, an individualized decision between you and your healthcare provider will decide whether screening between the ages of 76-85 would be appropriate. Colonoscopy: Not on file  FOBT/FIT: Not on file  Cologuard: Not on file  Sigmoidoscopy: Not on file          Prostate Cancer Screening Individualized decision between patient and health care provider in men between ages of 55-69   Medicare will cover every 12 months beginning on the day after your 50th birthday PSA: 3.66 ng/mL     History Prostate Cancer  Screening Not Indicated     Hepatitis C Screening Once for adults born between 1945 and 1965  More frequently in patients at high risk for Hepatitis C Hep C Antibody: Not on file        Diabetes Screening 1-2 times per year if you're at risk for diabetes or have pre-diabetes Fasting glucose: 108 mg/dL (5/28/2024)  A1C: 5.4 % (5/23/2023)  Screening Current   Cholesterol Screening Once every 5 years if you don't have a lipid disorder. May order more often based on risk factors. Lipid panel: 05/23/2023  Screening Current      Other Preventive Screenings Covered by Medicare:  Abdominal Aortic Aneurysm (AAA) Screening: covered once if your at risk. You're considered to be at risk if you have a family history of AAA or a male between the age of 65-75 who smoking at least 100 cigarettes in your lifetime.  Lung Cancer Screening: covers low dose CT scan once per year if you meet all of the following conditions: (1) Age 55-77; (2) No signs or symptoms of lung cancer; (3) Current smoker or have quit smoking within the last 15 years; (4) You have a tobacco smoking history of at least 20 pack years (packs per day x number of years you smoked); (5) You get a written order from a healthcare provider.  Glaucoma Screening: covered annually if you're considered high risk: (1) You have diabetes OR (2) Family history of glaucoma OR (3)  aged 50 and older OR (4)   American aged 65 and older  Osteoporosis Screening: covered every 2 years if you meet one of the following conditions: (1) Have a vertebral abnormality; (2) On glucocorticoid therapy for more than 3 months; (3) Have primary hyperparathyroidism; (4) On osteoporosis medications and need to assess response to drug therapy.  HIV Screening: covered annually if you're between the age of 15-65. Also covered annually if you are younger than 15 and older than 65 with risk factors for HIV infection. For pregnant patients, it is covered up to 3 times per pregnancy.    Immunizations:  Immunization Recommendations   Influenza Vaccine Annual influenza vaccination during flu season is recommended for all persons aged >= 6 months who do not have contraindications   Pneumococcal Vaccine   * Pneumococcal conjugate vaccine = PCV13 (Prevnar 13), PCV15 (Vaxneuvance), PCV20 (Prevnar 20)  * Pneumococcal polysaccharide vaccine = PPSV23 (Pneumovax) Adults 19-65 yo with certain risk factors or if 65+ yo  If never received any pneumonia vaccine: recommend Prevnar 20 (PCV20)  Give PCV20 if previously received 1 dose of PCV13 or PPSV23   Hepatitis B Vaccine 3 dose series if at intermediate or high risk (ex: diabetes, end stage renal disease, liver disease)   Respiratory syncytial virus (RSV) Vaccine - COVERED BY MEDICARE PART D  * RSVPreF3 (Arexvy) CDC recommends that adults 60 years of age and older may receive a single dose of RSV vaccine using shared clinical decision-making (SCDM)   Tetanus (Td) Vaccine - COST NOT COVERED BY MEDICARE PART B Following completion of primary series, a booster dose should be given every 10 years to maintain immunity against tetanus. Td may also be given as tetanus wound prophylaxis.   Tdap Vaccine - COST NOT COVERED BY MEDICARE PART B Recommended at least once for all adults. For pregnant patients, recommended with each pregnancy.   Shingles Vaccine (Shingrix) - COST NOT COVERED BY MEDICARE PART B  2  shot series recommended in those 19 years and older who have or will have weakened immune systems or those 50 years and older     Health Maintenance Due:  There are no preventive care reminders to display for this patient.  Immunizations Due:      Topic Date Due   • COVID-19 Vaccine (2 - 2023-24 season) 09/01/2023   • Influenza Vaccine (1) 09/01/2024     Advance Directives   What are advance directives?  Advance directives are legal documents that state your wishes and plans for medical care. These plans are made ahead of time in case you lose your ability to make decisions for yourself. Advance directives can apply to any medical decision, such as the treatments you want, and if you want to donate organs.   What are the types of advance directives?  There are many types of advance directives, and each state has rules about how to use them. You may choose a combination of any of the following:  Living will:  This is a written record of the treatment you want. You can also choose which treatments you do not want, which to limit, and which to stop at a certain time. This includes surgery, medicine, IV fluid, and tube feedings.   Durable power of  for healthcare (DPAHC):  This is a written record that states who you want to make healthcare choices for you when you are unable to make them for yourself. This person, called a proxy, is usually a family member or a friend. You may choose more than 1 proxy.  Do not resuscitate (DNR) order:  A DNR order is used in case your heart stops beating or you stop breathing. It is a request not to have certain forms of treatment, such as CPR. A DNR order may be included in other types of advance directives.  Medical directive:  This covers the care that you want if you are in a coma, near death, or unable to make decisions for yourself. You can list the treatments you want for each condition. Treatment may include pain medicine, surgery, blood transfusions, dialysis, IV or  tube feedings, and a ventilator (breathing machine).  Values history:  This document has questions about your views, beliefs, and how you feel and think about life. This information can help others choose the care that you would choose.  Why are advance directives important?  An advance directive helps you control your care. Although spoken wishes may be used, it is better to have your wishes written down. Spoken wishes can be misunderstood, or not followed. Treatments may be given even if you do not want them. An advance directive may make it easier for your family to make difficult choices about your care.   Fall Prevention    Fall prevention  includes ways to make your home and other areas safer. It also includes ways you can move more carefully to prevent a fall. Health conditions that cause changes in your blood pressure, vision, or muscle strength and coordination may increase your risk for falls. Medicines may also increase your risk for falls if they make you dizzy, weak, or sleepy.   Fall prevention tips:   Stand or sit up slowly.    Use assistive devices as directed.    Wear shoes that fit well and have soles that .    Wear a personal alarm.    Stay active.    Manage your medical conditions.    Home Safety Tips:  Add items to prevent falls in the bathroom.    Keep paths clear.    Install bright lights in your home.    Keep items you use often on shelves within reach.    Paint or place reflective tape on the edges of your stairs.    How to Quit Using Smokeless Tobacco   Why it is important to stop using smokeless tobacco:  Smokeless tobacco comes in many forms. Examples include chew, snuff, dip, dissolvable tobacco, and snus. All smokeless tobacco products contain nicotine and may contain as much nicotine as 3 cigarettes. You may be physically dependent on nicotine. You may also be emotionally addicted to it. The cravings can be strong, but it is important to quit using smokeless tobacco. You will  improve your health and decrease your cancer, stroke, and heart attack risk. Mouth sores and tooth problems will also improve when you quit. You can benefit from quitting no matter how long you have used smokeless tobacco.   Prepare to stop using smokeless tobacco:  Nicotine is a highly addictive drug. Withdrawal symptoms can happen when you stop and make it hard to quit. The following can help keep you on track:  Set a quit date.    Tell friends, family, and coworkers that you plan to quit.    Remove all smokeless tobacco products from your home, car, and workplace.    Manage weight gain after you quit:  Nicotine can affect your metabolism. You may gain a few pounds after you quit. The following can help you control your weight:  Eat healthy foods.    Drink water before, during, and between meals.    Exercise as directed.      Weight Management   Why it is important to manage your weight:  Being overweight increases your risk of health conditions such as heart disease, high blood pressure, type 2 diabetes, and certain types of cancer. It can also increase your risk for osteoarthritis, sleep apnea, and other respiratory problems. Aim for a slow, steady weight loss. Even a small amount of weight loss can lower your risk of health problems.  How to lose weight safely:  A safe and healthy way to lose weight is to eat fewer calories and get regular exercise. You can lose up about 1 pound a week by decreasing the number of calories you eat by 500 calories each day.   Healthy meal plan for weight management:  A healthy meal plan includes a variety of foods, contains fewer calories, and helps you stay healthy. A healthy meal plan includes the following:  Eat whole-grain foods more often.  A healthy meal plan should contain fiber. Fiber is the part of grains, fruits, and vegetables that is not broken down by your body. Whole-grain foods are healthy and provide extra fiber in your diet. Some examples of whole-grain foods are  whole-wheat breads and pastas, oatmeal, brown rice, and bulgur.  Eat a variety of vegetables every day.  Include dark, leafy greens such as spinach, kale, krissy greens, and mustard greens. Eat yellow and orange vegetables such as carrots, sweet potatoes, and winter squash.   Eat a variety of fruits every day.  Choose fresh or canned fruit (canned in its own juice or light syrup) instead of juice. Fruit juice has very little or no fiber.  Eat low-fat dairy foods.  Drink fat-free (skim) milk or 1% milk. Eat fat-free yogurt and low-fat cottage cheese. Try low-fat cheeses such as mozzarella and other reduced-fat cheeses.  Choose meat and other protein foods that are low in fat.  Choose beans or other legumes such as split peas or lentils. Choose fish, skinless poultry (chicken or turkey), or lean cuts of red meat (beef or pork). Before you cook meat or poultry, cut off any visible fat.   Use less fat and oil.  Try baking foods instead of frying them. Add less fat, such as margarine, sour cream, regular salad dressing and mayonnaise to foods. Eat fewer high-fat foods. Some examples of high-fat foods include french fries, doughnuts, ice cream, and cakes.  Eat fewer sweets.  Limit foods and drinks that are high in sugar. This includes candy, cookies, regular soda, and sweetened drinks.  Exercise:  Exercise at least 30 minutes per day on most days of the week. Some examples of exercise include walking, biking, dancing, and swimming. You can also fit in more physical activity by taking the stairs instead of the elevator or parking farther away from stores. Ask your healthcare provider about the best exercise plan for you.      © Copyright [employer] 2018 Information is for End User's use only and may not be sold, redistributed or otherwise used for commercial purposes. All illustrations and images included in CareNotes® are the copyrighted property of [employer] or [employer]

## 2024-08-23 NOTE — PROGRESS NOTES
Ambulatory Visit  Name: Aleks Dacosta      : 1940      MRN: [id number]  Encounter Provider: Stevenson Baptiste DO  Encounter Date: 2024   Encounter department: [facility]    Assessment & Plan   1. Essential hypertension  -     Basic metabolic panel; Future  -     CBC and differential; Future  -     TSH, 3rd generation; Future  2. Mild vascular dementia without behavioral disturbance, psychotic disturbance, mood disturbance, or anxiety (HCC)  3. Primary osteoarthritis involving multiple joints  4. Stage 4 chronic kidney disease (HCC)  -     Basic metabolic panel; Future  -     CBC and differential; Future  -     TSH, 3rd generation; Future  -     Hemoglobin A1C; Future  -     Lipid Panel with Direct LDL reflex; Future  5. Malignant neoplasm of prostate (HCC)  6. Suprapubic catheter (HCC)  7. History of CVA (cerebrovascular accident)  8. Diastolic dysfunction  9. Medicare annual wellness visit, subsequent  10. Screening for diabetes mellitus (DM)  -     Hemoglobin A1C; Future     A/P: Doing ok and will check labs. Son reports pt with be going to live with his other son soon. Continue current treatment and RTC four months for routine if still in the area.       History of Present Illness     WM RTC for f/u HTN, CKD4, etc. Doing ok and no new issues. Remains as acitve as his DJD will allow and no falls. Dementia no worse, but some safety concerns. Son unable to be with the pt  and so pt is going to live with his other relative in [place]. Chronic pain is manageable. Prostate Ca is in remission. Suprapubic cath functioning. No stroke like events. Due for labs.        Review of Systems   Constitutional:  Negative for activity change, chills, diaphoresis, fatigue and fever.   HENT: Negative.     Eyes:  Negative for visual disturbance.   Respiratory:  Negative for cough, chest tightness, shortness of breath and wheezing.    Cardiovascular:  Negative for chest pain, palpitations and leg swelling.  "  Gastrointestinal:  Negative for abdominal pain, constipation, diarrhea, nausea and vomiting.   Endocrine: Negative for cold intolerance and heat intolerance.   Genitourinary:  Negative for difficulty urinating, dysuria and frequency.   Musculoskeletal:  Negative for arthralgias, gait problem and myalgias.   Neurological:  Negative for dizziness, seizures, syncope, weakness, light-headedness and headaches.   Psychiatric/Behavioral:  Negative for confusion, dysphoric mood and sleep disturbance. The patient is not nervous/anxious.        Objective     /60   Pulse 76   Temp (!) 97.3 °F (36.3 °C)   Ht 5' 4\" (1.626 m)   Wt 67.7 kg (149 lb 3.2 oz)   SpO2 97%   BMI 25.61 kg/m²     Physical Exam  Vitals and nursing note reviewed.   Constitutional:       General: He is not in acute distress.     Appearance: Normal appearance. He is not ill-appearing.   HENT:      Head: Normocephalic and atraumatic.      Mouth/Throat:      Mouth: Mucous membranes are moist.   Eyes:      Extraocular Movements: Extraocular movements intact.      Conjunctiva/sclera: Conjunctivae normal.      Pupils: Pupils are equal, round, and reactive to light.   Neck:      Vascular: No carotid bruit.   Cardiovascular:      Rate and Rhythm: Normal rate and regular rhythm.      Heart sounds: Normal heart sounds. No murmur heard.  Pulmonary:      Effort: Pulmonary effort is normal. No respiratory distress.      Breath sounds: Normal breath sounds. No wheezing, rhonchi or rales.   Abdominal:      General: Bowel sounds are normal. There is no distension.      Palpations: Abdomen is soft.      Tenderness: There is no abdominal tenderness.   Musculoskeletal:      Cervical back: Neck supple.      Right lower leg: No edema.      Left lower leg: No edema.   Neurological:      General: No focal deficit present.      Mental Status: He is alert. Mental status is at baseline.   Psychiatric:         Mood and Affect: Mood normal.         Behavior: Behavior " normal.         Thought Content: Thought content normal.         Judgment: Judgment normal.       Administrative Statements

## 2024-08-23 NOTE — PROGRESS NOTES
Ambulatory Visit  Name: Aleks Dacosta      : 1940      MRN: [id number]  Encounter Provider: Stevenson Baptiste DO  Encounter Date: 2024   Encounter department: [facility]    Assessment & Plan   1. Essential hypertension  -     Basic metabolic panel; Future  -     CBC and differential; Future  -     TSH, 3rd generation; Future  2. Mild vascular dementia without behavioral disturbance, psychotic disturbance, mood disturbance, or anxiety (HCC)  3. Primary osteoarthritis involving multiple joints  4. Stage 4 chronic kidney disease (HCC)  -     Basic metabolic panel; Future  -     CBC and differential; Future  -     TSH, 3rd generation; Future  -     Hemoglobin A1C; Future  -     Lipid Panel with Direct LDL reflex; Future  5. Malignant neoplasm of prostate (HCC)  6. Suprapubic catheter (HCC)  7. History of CVA (cerebrovascular accident)  8. Diastolic dysfunction  9. Medicare annual wellness visit, subsequent  10. Screening for diabetes mellitus (DM)  -     Hemoglobin A1C; Future       Preventive health issues were discussed with patient, and age appropriate screening tests were ordered as noted in patient's After Visit Summary. Personalized health advice and appropriate referrals for health education or preventive services given if needed, as noted in patient's After Visit Summary.    History of Present Illness     HPI   Patient Care Team:  Stevenson Baptiste DO as PCP - General (Internal Medicine)    Review of Systems  Medical History Reviewed by provider this encounter:  Tobacco  Allergies  Meds  Problems  Med Hx  Surg Hx  Fam Hx       Annual Wellness Visit Questionnaire   Aleks is here for his Subsequent Wellness visit. Last Medicare Wellness visit information reviewed, patient interviewed and updates made to the record today.      Health Risk Assessment:   Patient rates overall health as good. Patient feels that their physical health rating is same. Patient is dissatisfied with their  life. Eyesight was rated as same. Hearing was rated as same. Patient feels that their emotional and mental health rating is slightly worse. Patients states they are sometimes angry. Patient states they are always unusually tired/fatigued. Pain experienced in the last 7 days has been none. Patient states that he has experienced no weight loss or gain in last 6 months.     Depression Screening:   PHQ-2 Score: 2      Fall Risk Screening:   In the past year, patient has experienced: history of falling in past year    Number of falls: 1  Injured during fall?: No    Feels unsteady when standing or walking?: Yes    Worried about falling?: No      Home Safety:  Patient does not have trouble with stairs inside or outside of their home. Patient has working smoke alarms and has working carbon monoxide detector. Home safety hazards include: none.     Nutrition:   Current diet is Regular.     Medications:   Patient is not currently taking any over-the-counter supplements. Patient is not able to manage medications.     Activities of Daily Living (ADLs)/Instrumental Activities of Daily Living (IADLs):   Walk and transfer into and out of bed and chair?: Yes  Dress and groom yourself?: No    Bathe or shower yourself?: No    Feed yourself? Yes  Do your laundry/housekeeping?: No  Manage your money, pay your bills and track your expenses?: No  Make your own meals?: No    Do your own shopping?: No    Previous Hospitalizations:   Any hospitalizations or ED visits within the last 12 months?: No      Advance Care Planning:   Living will: No    Durable POA for healthcare: No    Advanced directive counseling given: Yes    ACP document given: Yes    Patient declined ACP directive: No    End of Life Decisions reviewed with patient: Yes    Provider agrees with end of life decisions: Yes      Comments: Will review once POA reviews the info at home.    Cognitive Screening:   Provider or family/friend/caregiver concerned regarding cognition?:  Yes    Cognition Comments: Has dementia and had POA    PREVENTIVE SCREENINGS      Cardiovascular Screening:    General: Screening Current    Due for: Lipid Panel      Diabetes Screening:     General: Screening Current    Due for: Blood Glucose      Colorectal Cancer Screening:     General: Screening Not Indicated      Prostate Cancer Screening:    General: History Prostate Cancer and Screening Not Indicated      Osteoporosis Screening:    General: Screening Not Indicated      Abdominal Aortic Aneurysm (AAA) Screening:        General: Screening Not Indicated      Lung Cancer Screening:     General: Screening Not Indicated      Hepatitis C Screening:    General: Screening Not Indicated    Screening, Brief Intervention, and Referral to Treatment (SBIRT)    Screening  Typical number of drinks in a day: 0  Typical number of drinks in a week: 0  Interpretation: Low risk drinking behavior.    Single Item Drug Screening:  How often have you used an illegal drug (including marijuana) or a prescription medication for non-medical reasons in the past year? never    Single Item Drug Screen Score: 0  Interpretation: Negative screen for possible drug use disorder    Other Counseling Topics:   Car/seat belt/driving safety, sunscreen and regular weightbearing exercise and calcium and vitamin D intake.     Social Determinants of Health     Financial Resource Strain: Low Risk  (7/17/2023)    Overall Financial Resource Strain (CARDIA)     Difficulty of Paying Living Expenses: Not hard at all   Food Insecurity: No Food Insecurity (8/23/2024)    Hunger Vital Sign     Worried About Running Out of Food in the Last Year: Never true     Ran Out of Food in the Last Year: Never true   Transportation Needs: No Transportation Needs (8/23/2024)    PRAPARE - Transportation     Lack of Transportation (Medical): No     Lack of Transportation (Non-Medical): No   Housing Stability: Low Risk  (8/23/2024)    Housing Stability Vital Sign     Unable to  "Pay for Housing in the Last Year: No     Number of Times Moved in the Last Year: 0     Homeless in the Last Year: No   Utilities: Not At Risk (8/23/2024)    [facility] Utilities     Threatened with loss of utilities: No     No results found.    Objective     /60   Pulse 76   Temp (!) 97.3 °F (36.3 °C)   Ht 5' 4\" (1.626 m)   Wt 67.7 kg (149 lb 3.2 oz)   SpO2 97%   BMI 25.61 kg/m²     Physical Exam    A/P: Doing well and no falls. Denies depression and feels safe at home. Diverse diet. Doesn't drive, but uses seat belts. No living will or POA.Pt has five wishes at home already  for pt to review. No DME or referrals needed today. RTC in one year for medicare wellness.     "

## 2024-08-26 ENCOUNTER — TELEPHONE (OUTPATIENT)
Dept: UROLOGY | Facility: CLINIC | Age: 84
End: 2024-08-26

## 2024-08-26 NOTE — TELEPHONE ENCOUNTER
Called patient and left a detailed vm, per communication consent. I informed the pt that his PSA is up to 6.64 and that Dr. Kathleen is recommending getting a Lupron to put him into remission before he sees his new urologist...or his new urologist can give it. Informed the pt to call the office back to confirm on how he would like to proceed. Left office number for the pt to call the office back.

## 2024-08-26 NOTE — TELEPHONE ENCOUNTER
PT son called and would like call from clinical to further discuss the care for pt     Pt call [phone] Min (son)

## 2024-08-26 NOTE — TELEPHONE ENCOUNTER
Returned call to patients son Min- he wishes for patient to have 6 month Lupron injection scheduled here prior to patient moving out of state next Monday. Min is only off this Wednesday to bring patient in for an appointment. Nurse visit was scheduled for 8/28/24 @ 2:30 pm in [place].

## 2024-08-26 NOTE — TELEPHONE ENCOUNTER
----- Message from Jose Alberto Kathleen MD sent at 8/26/2024 12:03 PM EDT -----  Please let him know that his PSA is up to 6.64-I recommend getting a Lupron to put him into remission before he sees his new urologist.  Or his new urologist can give it.

## 2024-08-28 ENCOUNTER — PROCEDURE VISIT (OUTPATIENT)
Dept: UROLOGY | Facility: CLINIC | Age: 84
End: 2024-08-28
Payer: MEDICARE

## 2024-08-28 ENCOUNTER — TELEPHONE (OUTPATIENT)
Dept: UROLOGY | Facility: CLINIC | Age: 84
End: 2024-08-28

## 2024-08-28 VITALS
HEART RATE: 82 BPM | WEIGHT: 147.6 LBS | SYSTOLIC BLOOD PRESSURE: 120 MMHG | BODY MASS INDEX: 25.2 KG/M2 | DIASTOLIC BLOOD PRESSURE: 68 MMHG | HEIGHT: 64 IN

## 2024-08-28 DIAGNOSIS — C61 MALIGNANT NEOPLASM OF PROSTATE (HCC): Primary | ICD-10-CM

## 2024-08-28 PROCEDURE — 96402 CHEMO HORMON ANTINEOPL SQ/IM: CPT

## 2024-08-28 NOTE — PROGRESS NOTES
"8/28/2024  Aleks Dacosta is a 84 y.o. male  [id number]    Diagnosis: Prostate Cancer   Chief Complaint    Lupron injection         Patient presents for 6 month Lupron injection managed by Dr. Kathleen.     Plan:    Patient is moving to [place] next week and will be establishing care with a Urologist there who will take over his care. Son to call after Urologist is established to have records faxed to new Urologist.       Medication administration:    Lupron 45 mg administered IM as ordered in right dorsogluteal muscle. Patient tolerated well with no immediate side effects. Potential side effects reviewed with patient and son.     Administrations This Visit       leuprolide (LUPRON DEPOT 6 MONTH KIT) IM injection kit 45 mg       Admin Date  08/28/2024 Action  Given Dose  45 mg Route  Intramuscular Documented By  Yanique Johnson RN                    Vitals:    08/28/24 1434   BP: 120/68   Pulse: 82   Weight: 67 kg (147 lb 9.6 oz)   Height: 5' 4\" (1.626 m)         Yanique Johnson RN   "

## 2024-08-28 NOTE — PROGRESS NOTES
I supervised the Advanced Practitioner on . I reviewed the Advanced Practitioner note and agree.    Jose Alberto Kathleen MD 08/28/24

## 2024-09-13 ENCOUNTER — TELEPHONE (OUTPATIENT)
Age: 84
End: 2024-09-13

## 2024-09-13 NOTE — TELEPHONE ENCOUNTER
Patient is now residing with daughter in [facility] in [place] , she states she left a vmail yesterday with information to [facility],  please advise further

## 2024-09-17 NOTE — TELEPHONE ENCOUNTER
Jen calling back, she spoke with Teresa from the [facility] who asked her for the fax number for the [facility] which is where the medical records need to be sent.      Can someone please contact the [facility] and supply them with the below fax number for this patient so they can get started on getting his medical record sent to the [facility] asa?  He needs to see Urology urgently.  Thank you!    [facility]  Fax:  [phone]    Jen requests a call back to let her know it was sent over.

## 2024-09-17 NOTE — TELEPHONE ENCOUNTER
Jen calling back to check the status of the medical records request.  I did warm transfer her to Teresa in the [facility] to further assist with getting records sent to Urology in [place].

## 2024-09-25 NOTE — TELEPHONE ENCOUNTER
Jen patient's daughter in law called to follow up on medical records request. I did inform Jen that [facility] fax number was provided to medical records on 9/19.     Warm transferred to [facility] for further assistance.

## (undated) DEVICE — CATH SUPRAPUBIC PEELAWAY SET  20FR X 12CM

## (undated) DEVICE — SUT SILK 2-0 SH 30 IN K833H

## (undated) DEVICE — BAG URINE DRAINAGE 4000ML CONTINUOUS IRR

## (undated) DEVICE — Device: Brand: LEVEL 1

## (undated) DEVICE — SKIN MARKER DUAL TIP WITH RULER CAP, FLEXIBLE RULER AND LABELS: Brand: DEVON

## (undated) DEVICE — CATH FOLEY 16FR 5ML 2WAY LUBRICATH

## (undated) DEVICE — EVACUATOR BLADDER ELLIK DISP STRL

## (undated) DEVICE — SPONGE LAP 18 X 18 IN STRL RFD

## (undated) DEVICE — CATH SECURE FOLEY

## (undated) DEVICE — URO CATCHER BAG STERILE 0-UC32

## (undated) DEVICE — GAUZE SPONGES,16 PLY: Brand: CURITY

## (undated) DEVICE — STORZ LOOP ELECTRODE 24 FR

## (undated) DEVICE — CATH FOLEY COUDE HEMATURIA 24FR 30ML 3 WAY LUBRICATH

## (undated) DEVICE — GLOVE SRG BIOGEL 7

## (undated) DEVICE — CATHETER PLUG WITH CAP: Brand: DOVER

## (undated) DEVICE — 1820 FOAM BLOCK NEEDLE COUNTER: Brand: DEVON

## (undated) DEVICE — UROCATCH BAG

## (undated) DEVICE — BLADE ELECTRODE 24FR

## (undated) DEVICE — 3M™ DURAPORE™ SURGICAL TAPE 2 INCHES X 10YARDS (5.0CM X 9.1M) 6ROLLS/CARTON 10CARTONS/CASE 1538-2: Brand: 3M™ DURAPORE™

## (undated) DEVICE — LUBRICANT SURGILUBE TUBE 4 OZ  FLIP TOP

## (undated) DEVICE — CHLORHEXIDINE 4PCT 4 OZ

## (undated) DEVICE — FACIAL INCISE NEEDLE-URO CART

## (undated) DEVICE — TUBING SUCTION 5MM X 12 FT

## (undated) DEVICE — SCD SEQUENTIAL COMPRESSION COMFORT SLEEVE MEDIUM KNEE LENGTH: Brand: KENDALL SCD

## (undated) DEVICE — CYSTO TUBING TUR Y IRRIGATION

## (undated) DEVICE — INTENDED FOR TISSUE SEPARATION, AND OTHER PROCEDURES THAT REQUIRE A SHARP SURGICAL BLADE TO PUNCTURE OR CUT.: Brand: BARD-PARKER SAFETY BLADES SIZE 11, STERILE

## (undated) DEVICE — BAG URINE DRAINAGE 2000ML ANTI RFLX LF

## (undated) DEVICE — 3M™ STERI-STRIP™ COMPOUND BENZOIN TINCTURE 40 BAGS/CARTON 4 CARTONS/CASE C1544: Brand: 3M™ STERI-STRIP™

## (undated) DEVICE — BASIC SINGLE BASIN-LF: Brand: MEDLINE INDUSTRIES, INC.

## (undated) DEVICE — CATH FOLEY HEMATURIA 24FR 30ML 3 WAY LUBRICATH

## (undated) DEVICE — PACK TUR

## (undated) DEVICE — BETHLEHEM UNIVERSAL MINOR VAG: Brand: CARDINAL HEALTH